# Patient Record
Sex: FEMALE | Race: BLACK OR AFRICAN AMERICAN | Employment: UNEMPLOYED | URBAN - METROPOLITAN AREA
[De-identification: names, ages, dates, MRNs, and addresses within clinical notes are randomized per-mention and may not be internally consistent; named-entity substitution may affect disease eponyms.]

---
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
## 2022-04-29 ENCOUNTER — HOSPITAL ENCOUNTER (EMERGENCY)
Age: 22
Discharge: HOME | End: 2022-04-29
Attending: EMERGENCY MEDICINE
Payer: COMMERCIAL

## 2022-04-29 VITALS
BODY MASS INDEX: 49.87 KG/M2 | HEIGHT: 62 IN | HEART RATE: 89 BPM | TEMPERATURE: 97.5 F | DIASTOLIC BLOOD PRESSURE: 65 MMHG | WEIGHT: 271 LBS | SYSTOLIC BLOOD PRESSURE: 121 MMHG | RESPIRATION RATE: 16 BRPM | OXYGEN SATURATION: 99 %

## 2022-04-29 DIAGNOSIS — R30.0 DYSURIA: Primary | ICD-10-CM

## 2022-04-29 DIAGNOSIS — R10.2 PELVIC CRAMPING: ICD-10-CM

## 2022-04-29 LAB
BACTERIA: ABNORMAL /HPF
BILIRUBIN URINE: NEGATIVE
BLOOD, URINE: ABNORMAL
CLARITY: CLEAR
COLOR: YELLOW
EPITHELIAL CELLS, UA: ABNORMAL /HPF
GLUCOSE URINE: NEGATIVE MG/DL
HCG(URINE) PREGNANCY TEST: NEGATIVE
KETONES, URINE: NEGATIVE MG/DL
LEUKOCYTE ESTERASE, URINE: NEGATIVE
NITRITE, URINE: NEGATIVE
PH UA: 6.5 (ref 5–9)
PROTEIN UA: NEGATIVE MG/DL
RBC UA: ABNORMAL /HPF (ref 0–2)
SPECIFIC GRAVITY UA: 1.01 (ref 1–1.03)
UROBILINOGEN, URINE: 0.2 E.U./DL
WBC UA: ABNORMAL /HPF (ref 0–5)

## 2022-04-29 PROCEDURE — 81025 URINE PREGNANCY TEST: CPT

## 2022-04-29 PROCEDURE — 81001 URINALYSIS AUTO W/SCOPE: CPT

## 2022-04-29 PROCEDURE — 99283 EMERGENCY DEPT VISIT LOW MDM: CPT

## 2022-04-29 ASSESSMENT — PAIN - FUNCTIONAL ASSESSMENT
PAIN_FUNCTIONAL_ASSESSMENT: NONE - DENIES PAIN
PAIN_FUNCTIONAL_ASSESSMENT: NONE - DENIES PAIN

## 2022-04-29 NOTE — ED PROVIDER NOTES
Department of Emergency Medicine   ED  Provider Note  Admit Date/RoomTime: 4/29/2022  9:27 AM  ED Room: 01/01 4/29/22  9:48 AM EDT    History of Present Illness:    Nayana Lewis is a 22 y.o. female presenting to the ED for pelvic cramps and pain on urination, beginning 3 days ago. The complaint has been persistent, mild in severity, and worsened by nothing. Denies any  sexual encounters. Patient denies fever/chills, sore throat, cough, congestion, chest pain, shortness of breath, edema, headache, visual disturbances, focal paresthesias, focal weakness, abdominal pain, nausea, vomiting, diarrhea, constipation, hematuria, trauma, neck or back pain, rash or other complaints. ROS:   A complete review of systems was performed and all pertinent positives and negatives are stated within HPI, all other systems reviewed and are negative.          --------------------------------------------- PAST HISTORY ---------------------------------------------  Past Medical History:  has a past medical history of ADHD, Autism disorder, Colitis, Schizophrenia ([facility] [address] and Seizures ([facility] Past Surgical History:  has a past surgical history that includes Breast surgery (Right) and laparoscopy (Left, 8/9/2019). Social History:  reports that she has never smoked. She has never used smokeless tobacco. She reports that she does not drink alcohol and does not use drugs. Family History: family history includes Diabetes in her mother; Other in her mother. Unless otherwise noted, family history is non contributory    The patients home medications have been reviewed.     Allergies: Dye [barium-containing compounds], Sulfa antibiotics, and Versed [midazolam]    -------------------------------------------------- RESULTS -------------------------------------------------  All laboratory and radiology results have been personally reviewed by myself   LABS:  Results for orders placed or performed during the hospital encounter of 04/29/22   Urinalysis with Microscopic   Result Value Ref Range    Color, UA Yellow Straw/Yellow    Clarity, UA Clear Clear    Glucose, Ur Negative Negative mg/dL    Bilirubin Urine Negative Negative    Ketones, Urine Negative Negative mg/dL    Specific Gravity, UA 1.010 1.005 - 1.030    Blood, Urine SMALL (A) Negative    pH, UA 6.5 5.0 - 9.0    Protein, UA Negative Negative mg/dL    Urobilinogen, Urine 0.2 <2.0 E.U./dL    Nitrite, Urine Negative Negative    Leukocyte Esterase, Urine Negative Negative    WBC, UA 0-1 0 - 5 /HPF    RBC, UA NONE 0 - 2 /HPF    Epithelial Cells, UA RARE /HPF    Bacteria, UA RARE (A) None Seen /HPF   Pregnancy, Urine   Result Value Ref Range    HCG(Urine) Pregnancy Test NEGATIVE NEGATIVE       RADIOLOGY:  Interpreted by Radiologist.  No orders to display       ------------------------- NURSING NOTES AND VITALS REVIEWED ---------------------------   The nursing notes within the ED encounter and vital signs as below have been reviewed. /65   Pulse 89   Temp 97.5 °F (36.4 °C) (Temporal)   Resp 16   Ht 5' 2\" (1.575 m)   Wt 271 lb (122.9 kg)   LMP 04/05/2022   SpO2 99%   BMI 49.57 kg/m²   Oxygen Saturation Interpretation: Normal      ---------------------------------------------------PHYSICAL EXAM--------------------------------------    Constitutional:  Well developed, well nourished, no acute distress, non-toxic appearance   Eyes:  PERRL, conjunctiva normal, EOMI  HENT:  Atraumatic, external ears normal, nose normal, oropharynx moist, no pharyngeal exudates. Neck- normal range of motion, no nuchal rigidity   Respiratory:  No respiratory distress, normal breath sounds, no rales, no wheezing   Cardiovascular:  Normal rate, normal rhythm, no murmurs, no gallops, no rubs. Radial and DP pulses 2+ bilaterally.    GI:  Soft, nondistended, normal bowel sounds, nontender, no organomegaly, no mass, no rebound, no guarding   :  No costovertebral angle tenderness Musculoskeletal:  No edema, no tenderness, no deformities. Back- no tenderness  Integument:  Well hydrated, no rash. Adequate perfusion. Lymphatic:  No cervical lymphadenopathy noted       ------------------------------ ED COURSE/MEDICAL DECISION MAKING----------------------  Medications - No data to display       MEDICATION  Discharge Medication List as of 4/29/2022 10:17 AM          Medical Decision Making:    Patient presenting with Pelvic cramps and dysuria. Irregular periods. Urine Preg negative, Urinalysis negative with UTI. Likely onset of menses per patient with pelvic cramps similar to previous. FU with PCP for further eval for dysuria. Patient was explicitly instructed on specific signs and symptoms on which to return to the emergency room for. Patient was instructed to return to the ER for any new or worsening symptoms. Additional discharge instructions were given verbally. All questions were answered. Patient is comfortable and agreeable with discharge plan. Patient in no acute distress and non-toxic in appearance. Counseling: The emergency provider has spoken with the patient and discussed todays results, in addition to providing specific details for the plan of care and counseling regarding the diagnosis and prognosis. Questions are answered at this time and they are agreeable with the plan.      --------------------------------- IMPRESSION AND DISPOSITION ---------------------------------    IMPRESSION  1. Dysuria    2.  Pelvic cramping          DISPOSITION  Disposition: Discharge to home  Patient condition is good                Lianne Esteban DO  Resident  05/02/22 9867

## 2022-06-21 ENCOUNTER — APPOINTMENT (OUTPATIENT)
Dept: GENERAL RADIOLOGY | Age: 22
End: 2022-06-21
Payer: COMMERCIAL

## 2022-06-21 ENCOUNTER — HOSPITAL ENCOUNTER (EMERGENCY)
Age: 22
Discharge: HOME | End: 2022-06-22
Attending: STUDENT IN AN ORGANIZED HEALTH CARE EDUCATION/TRAINING PROGRAM
Payer: COMMERCIAL

## 2022-06-21 ENCOUNTER — APPOINTMENT (OUTPATIENT)
Dept: CT IMAGING | Age: 22
End: 2022-06-21
Payer: COMMERCIAL

## 2022-06-21 DIAGNOSIS — G40.919 BREAKTHROUGH SEIZURE (HCC): Primary | ICD-10-CM

## 2022-06-21 LAB
ACETAMINOPHEN LEVEL: <5 MCG/ML (ref 10–30)
AMPHETAMINE SCREEN, URINE: NOT DETECTED
ANION GAP SERPL CALCULATED.3IONS-SCNC: 10 MMOL/L (ref 7–16)
BACTERIA: ABNORMAL /HPF
BARBITURATE SCREEN URINE: NOT DETECTED
BASOPHILS ABSOLUTE: 0.05 E9/L (ref 0–0.2)
BASOPHILS RELATIVE PERCENT: 0.4 % (ref 0–2)
BENZODIAZEPINE SCREEN, URINE: NOT DETECTED
BILIRUBIN URINE: NEGATIVE
BLOOD, URINE: NEGATIVE
BUN BLDV-MCNC: 11 MG/DL (ref 6–20)
CALCIUM SERPL-MCNC: 9.8 MG/DL (ref 8.6–10.2)
CANNABINOID SCREEN URINE: NOT DETECTED
CHLORIDE BLD-SCNC: 102 MMOL/L (ref 98–107)
CHP ED QC CHECK: NORMAL
CLARITY: CLEAR
CO2: 26 MMOL/L (ref 22–29)
COCAINE METABOLITE SCREEN URINE: NOT DETECTED
COLOR: YELLOW
CREAT SERPL-MCNC: 0.9 MG/DL (ref 0.5–1)
EOSINOPHILS ABSOLUTE: 0.51 E9/L (ref 0.05–0.5)
EOSINOPHILS RELATIVE PERCENT: 4.4 % (ref 0–6)
EPITHELIAL CELLS, UA: ABNORMAL /HPF
ETHANOL: <10 MG/DL (ref 0–0.08)
FENTANYL SCREEN, URINE: NOT DETECTED
GFR AFRICAN AMERICAN: >60
GFR NON-AFRICAN AMERICAN: >60 ML/MIN/1.73
GLUCOSE BLD-MCNC: 121 MG/DL
GLUCOSE BLD-MCNC: 128 MG/DL (ref 74–99)
GLUCOSE URINE: NEGATIVE MG/DL
HCT VFR BLD CALC: 41.3 % (ref 34–48)
HEMOGLOBIN: 12.7 G/DL (ref 11.5–15.5)
IMMATURE GRANULOCYTES #: 0.21 E9/L
IMMATURE GRANULOCYTES %: 1.8 % (ref 0–5)
KETONES, URINE: NEGATIVE MG/DL
LACTIC ACID: 1.4 MMOL/L (ref 0.5–2.2)
LEUKOCYTE ESTERASE, URINE: ABNORMAL
LITHIUM DOSE AMOUNT: ABNORMAL
LITHIUM LEVEL: 0.36 MMOL/L (ref 0.5–1.5)
LYMPHOCYTES ABSOLUTE: 2.68 E9/L (ref 1.5–4)
LYMPHOCYTES RELATIVE PERCENT: 23 % (ref 20–42)
Lab: NORMAL
MCH RBC QN AUTO: 25.2 PG (ref 26–35)
MCHC RBC AUTO-ENTMCNC: 30.8 % (ref 32–34.5)
MCV RBC AUTO: 81.9 FL (ref 80–99.9)
METER GLUCOSE: 121 MG/DL (ref 74–99)
METHADONE SCREEN, URINE: NOT DETECTED
MONOCYTES ABSOLUTE: 1.1 E9/L (ref 0.1–0.95)
MONOCYTES RELATIVE PERCENT: 9.4 % (ref 2–12)
NEUTROPHILS ABSOLUTE: 7.12 E9/L (ref 1.8–7.3)
NEUTROPHILS RELATIVE PERCENT: 61 % (ref 43–80)
NITRITE, URINE: NEGATIVE
OPIATE SCREEN URINE: NOT DETECTED
OXYCODONE URINE: NOT DETECTED
PDW BLD-RTO: 13.2 FL (ref 11.5–15)
PH UA: 7 (ref 5–9)
PHENCYCLIDINE SCREEN URINE: NOT DETECTED
PLATELET # BLD: 413 E9/L (ref 130–450)
PMV BLD AUTO: 9.3 FL (ref 7–12)
POTASSIUM REFLEX MAGNESIUM: 4.6 MMOL/L (ref 3.5–5)
PRO-BNP: 17 PG/ML (ref 0–125)
PROTEIN UA: NEGATIVE MG/DL
RBC # BLD: 5.04 E12/L (ref 3.5–5.5)
RBC UA: ABNORMAL /HPF (ref 0–2)
SALICYLATE, SERUM: <0.3 MG/DL (ref 0–30)
SODIUM BLD-SCNC: 138 MMOL/L (ref 132–146)
SPECIFIC GRAVITY UA: <=1.005 (ref 1–1.03)
TRICYCLIC ANTIDEPRESSANTS SCREEN SERUM: NEGATIVE NG/ML
TROPONIN, HIGH SENSITIVITY: 7 NG/L (ref 0–9)
UROBILINOGEN, URINE: 0.2 E.U./DL
WBC # BLD: 11.7 E9/L (ref 4.5–11.5)
WBC UA: ABNORMAL /HPF (ref 0–5)

## 2022-06-21 PROCEDURE — 80179 DRUG ASSAY SALICYLATE: CPT

## 2022-06-21 PROCEDURE — 83605 ASSAY OF LACTIC ACID: CPT

## 2022-06-21 PROCEDURE — 70450 CT HEAD/BRAIN W/O DYE: CPT

## 2022-06-21 PROCEDURE — 80143 DRUG ASSAY ACETAMINOPHEN: CPT

## 2022-06-21 PROCEDURE — 93005 ELECTROCARDIOGRAM TRACING: CPT | Performed by: EMERGENCY MEDICINE

## 2022-06-21 PROCEDURE — 83880 ASSAY OF NATRIURETIC PEPTIDE: CPT

## 2022-06-21 PROCEDURE — 6360000002 HC RX W HCPCS: Performed by: EMERGENCY MEDICINE

## 2022-06-21 PROCEDURE — 96372 THER/PROPH/DIAG INJ SC/IM: CPT

## 2022-06-21 PROCEDURE — 96365 THER/PROPH/DIAG IV INF INIT: CPT

## 2022-06-21 PROCEDURE — 81001 URINALYSIS AUTO W/SCOPE: CPT

## 2022-06-21 PROCEDURE — 82077 ASSAY SPEC XCP UR&BREATH IA: CPT

## 2022-06-21 PROCEDURE — 80178 ASSAY OF LITHIUM: CPT

## 2022-06-21 PROCEDURE — 99285 EMERGENCY DEPT VISIT HI MDM: CPT

## 2022-06-21 PROCEDURE — 80048 BASIC METABOLIC PNL TOTAL CA: CPT

## 2022-06-21 PROCEDURE — 82962 GLUCOSE BLOOD TEST: CPT

## 2022-06-21 PROCEDURE — 2580000003 HC RX 258: Performed by: EMERGENCY MEDICINE

## 2022-06-21 PROCEDURE — 85025 COMPLETE CBC W/AUTO DIFF WBC: CPT

## 2022-06-21 PROCEDURE — 71045 X-RAY EXAM CHEST 1 VIEW: CPT

## 2022-06-21 PROCEDURE — 84484 ASSAY OF TROPONIN QUANT: CPT

## 2022-06-21 PROCEDURE — 80307 DRUG TEST PRSMV CHEM ANLYZR: CPT

## 2022-06-21 RX ORDER — AMLODIPINE BESYLATE 5 MG/1
5 TABLET ORAL DAILY
Status: ON HOLD | COMMUNITY
End: 2022-06-24 | Stop reason: HOSPADM

## 2022-06-21 RX ORDER — LORAZEPAM 2 MG/ML
1 INJECTION INTRAMUSCULAR ONCE
Status: COMPLETED | OUTPATIENT
Start: 2022-06-21 | End: 2022-06-21

## 2022-06-21 RX ORDER — LORAZEPAM 1 MG/1
1 TABLET ORAL ONCE
Status: DISCONTINUED | OUTPATIENT
Start: 2022-06-21 | End: 2022-06-21

## 2022-06-21 RX ORDER — LORAZEPAM 0.5 MG/1
0.5 TABLET ORAL 2 TIMES DAILY
Status: ON HOLD | COMMUNITY
End: 2022-06-24 | Stop reason: HOSPADM

## 2022-06-21 RX ORDER — 0.9 % SODIUM CHLORIDE 0.9 %
1000 INTRAVENOUS SOLUTION INTRAVENOUS ONCE
Status: COMPLETED | OUTPATIENT
Start: 2022-06-21 | End: 2022-06-21

## 2022-06-21 RX ORDER — ARIPIPRAZOLE 400 MG
400 KIT INTRAMUSCULAR
Status: ON HOLD | COMMUNITY
End: 2022-06-24 | Stop reason: HOSPADM

## 2022-06-21 RX ORDER — LITHIUM CARBONATE 300 MG/1
300 CAPSULE ORAL
Status: ON HOLD | COMMUNITY
End: 2022-10-01 | Stop reason: SDUPTHER

## 2022-06-21 RX ORDER — PROMETHAZINE HYDROCHLORIDE 25 MG/ML
25 INJECTION, SOLUTION INTRAMUSCULAR; INTRAVENOUS ONCE
Status: COMPLETED | OUTPATIENT
Start: 2022-06-21 | End: 2022-06-21

## 2022-06-21 RX ADMIN — PROMETHAZINE HYDROCHLORIDE 25 MG: 25 INJECTION INTRAMUSCULAR; INTRAVENOUS at 19:15

## 2022-06-21 RX ADMIN — LEVETIRACETAM 2000 MG: 100 INJECTION, SOLUTION INTRAVENOUS at 18:30

## 2022-06-21 RX ADMIN — LORAZEPAM 1 MG: 2 INJECTION INTRAMUSCULAR; INTRAVENOUS at 18:10

## 2022-06-21 RX ADMIN — SODIUM CHLORIDE 1000 ML: 9 INJECTION, SOLUTION INTRAVENOUS at 19:55

## 2022-06-21 NOTE — ED PROVIDER NOTES
Chief Complaint   Patient presents with    Seizures       45-year-old female with past medical history of seizures presenting today with a breakthrough seizure like activity. EMS brought her to the emergency department and note that they have brought her here in the past multiple times, they noted that she has not been compliant with her medications before, her seizure was witnessed and was said to be like her usual activity, grand mal in nature. She had not been sick at all recently, nothing made it better or worse, she was postictal on arrival to emergency department was unable to provide further history. Review of Systems   Unable to perform ROS: Mental status change        Physical Exam  Vitals and nursing note reviewed. Constitutional:       Appearance: She is well-developed. HENT:      Head: Normocephalic and atraumatic. Right Ear: External ear normal.      Left Ear: External ear normal.      Nose: Nose normal.      Mouth/Throat:      Mouth: Mucous membranes are moist.      Pharynx: Oropharynx is clear. Eyes:      Pupils: Pupils are equal, round, and reactive to light. Cardiovascular:      Rate and Rhythm: Normal rate and regular rhythm. Heart sounds: Normal heart sounds. No murmur heard. Pulmonary:      Effort: Pulmonary effort is normal. No respiratory distress. Breath sounds: Normal breath sounds. No wheezing. Abdominal:      General: Bowel sounds are normal.      Palpations: Abdomen is soft. Tenderness: There is no abdominal tenderness. There is no guarding or rebound. Musculoskeletal:         General: No swelling. Cervical back: Normal range of motion and neck supple. Skin:     General: Skin is warm and dry. Neurological:      General: No focal deficit present. Mental Status: She is alert.       Comments: Patient postictal on arrival to emergency department, is arousable, pupils are equal and reactive, no focal deficits, moving all extremities          Procedures     EKG: This EKG is signed and interpreted by me. Rate: 98  Rhythm: Sinus  Interpretation: no acute changes, no acute ST elevations, intervals are normal, , normal axis  Comparison: stable as compared to patient's most recent EKG      MDM  Number of Diagnoses or Management Options  Breakthrough seizure [facility])  Diagnosis management comments: 29-year-old female past medical history of seizures presenting with breakthrough seizure-like activity. On arrival to emergency department she was tachycardic and slightly hypertensive otherwise hemodynamically stable. Lab work did not demonstrate acute abnormality, no substance intoxication, no evidence of infection, glucose was within normal limits. CT head was obtained without acute pathology or explanation. EKG unremarkable. She was loaded with Keppra and managed with Ativan as she was minimally shaking on arrival to emergency department has been noted to have a variety of different types of seizures. She not have a recurrence of grand mal seizure in the emergency department. Patient became awake and alert with observation, discussed case with her mom who said that patient seemed as if she was at her baseline. Discussed plans for follow-up with neurology and they verbalized understanding. ED Course as of 06/21/22 2056 Tue Jun 21, 2022 1947 Patient is awake, alert, oriented. No other acute complaints. [MM]   2040 Discussed patient's mental status with her mother who is currently out of town and from description of what we are seeing in the emergency department, she does believe the patient is at her baseline. Mom notes that she is handicapped and is always slow to speak. No other concerning events. [MM]   2051 Discussed plan for discharge with patient verbalized understanding, alert and oriented and at baseline.  [MM]      ED Course User Index  [MM] Pearl Sin DO        ED Course as of 06/21/22 2056 Tue Pramod 21, 2022 1947 Patient is awake, alert, oriented. No other acute complaints. [MM]   2040 Discussed patient's mental status with her mother who is currently out of town and from description of what we are seeing in the emergency department, she does believe the patient is at her baseline. Mom notes that she is handicapped and is always slow to speak. No other concerning events. [MM]   2051 Discussed plan for discharge with patient verbalized understanding, alert and oriented and at baseline. [MM]      ED Course User Index  [MM] Iliana Carrasco, DO       --------------------------------------------- PAST HISTORY ---------------------------------------------  Past Medical History:  has a past medical history of ADHD, Autism disorder, Colitis, Schizophrenia ([facility]), and Seizures ([facility]). Past Surgical History:  has a past surgical history that includes Breast surgery (Right) and laparoscopy (Left, 8/9/2019). Social History:  reports that she has never smoked. She has never used smokeless tobacco. She reports that she does not drink alcohol and does not use drugs. Family History: family history includes Diabetes in her mother; Other in her mother. The patients home medications have been reviewed.     Allergies: Dye [barium-containing compounds], Sulfa antibiotics, and Versed [midazolam]    -------------------------------------------------- RESULTS -------------------------------------------------  Labs:  Results for orders placed or performed during the hospital encounter of 06/21/22   CBC with Auto Differential   Result Value Ref Range    WBC 11.7 (H) 4.5 - 11.5 E9/L    RBC 5.04 3.50 - 5.50 E12/L    Hemoglobin 12.7 11.5 - 15.5 g/dL    Hematocrit 41.3 34.0 - 48.0 %    MCV 81.9 80.0 - 99.9 fL    MCH 25.2 (L) 26.0 - 35.0 pg    MCHC 30.8 (L) 32.0 - 34.5 %    RDW 13.2 11.5 - 15.0 fL    Platelets 856 452 - 591 E9/L    MPV 9.3 7.0 - 12.0 fL    Neutrophils % 61.0 43.0 - 80.0 %    Immature Granulocytes % 1.8 0.0 - 5.0 %    Lymphocytes % 23.0 20.0 - 42.0 %    Monocytes % 9.4 2.0 - 12.0 %    Eosinophils % 4.4 0.0 - 6.0 %    Basophils % 0.4 0.0 - 2.0 %    Neutrophils Absolute 7.12 1.80 - 7.30 E9/L    Immature Granulocytes # 0.21 E9/L    Lymphocytes Absolute 2.68 1.50 - 4.00 E9/L    Monocytes Absolute 1.10 (H) 0.10 - 0.95 E9/L    Eosinophils Absolute 0.51 (H) 0.05 - 0.50 E9/L    Basophils Absolute 0.05 0.00 - 0.20 C0/A   Basic Metabolic Panel w/ Reflex to MG   Result Value Ref Range    Sodium 138 132 - 146 mmol/L    Potassium reflex Magnesium 4.6 3.5 - 5.0 mmol/L    Chloride 102 98 - 107 mmol/L    CO2 26 22 - 29 mmol/L    Anion Gap 10 7 - 16 mmol/L    Glucose 128 (H) 74 - 99 mg/dL    BUN 11 6 - 20 mg/dL    CREATININE 0.9 0.5 - 1.0 mg/dL    GFR Non-African American >60 >=60 mL/min/1.73    GFR African American >60     Calcium 9.8 8.6 - 10.2 mg/dL   Troponin   Result Value Ref Range    Troponin, High Sensitivity 7 0 - 9 ng/L   Brain Natriuretic Peptide   Result Value Ref Range    Pro-BNP 17 0 - 125 pg/mL   Urinalysis with Microscopic   Result Value Ref Range    Color, UA Yellow Straw/Yellow    Clarity, UA Clear Clear    Glucose, Ur Negative Negative mg/dL    Bilirubin Urine Negative Negative    Ketones, Urine Negative Negative mg/dL    Specific Gravity, UA <=1.005 1.005 - 1.030    Blood, Urine Negative Negative    pH, UA 7.0 5.0 - 9.0    Protein, UA Negative Negative mg/dL    Urobilinogen, Urine 0.2 <2.0 E.U./dL    Nitrite, Urine Negative Negative    Leukocyte Esterase, Urine TRACE (A) Negative    WBC, UA 0-1 0 - 5 /HPF    RBC, UA NONE 0 - 2 /HPF    Epithelial Cells, UA FEW /HPF    Bacteria, UA RARE (A) None Seen /HPF   Lactic Acid   Result Value Ref Range    Lactic Acid 1.4 0.5 - 2.2 mmol/L   Serum Drug Screen   Result Value Ref Range    Ethanol Lvl <10 mg/dL    Acetaminophen Level <5.0 (L) 10.0 - 16.2 mcg/mL    Salicylate, Serum <3.2 0.0 - 30.0 mg/dL   URINE DRUG SCREEN   Result Value Ref Range    Amphetamine Screen, Urine NOT DETECTED Negative <1000 ng/mL    Barbiturate Screen, Ur NOT DETECTED Negative < 200 ng/mL    Benzodiazepine Screen, Urine NOT DETECTED Negative < 200 ng/mL    Cannabinoid Scrn, Ur NOT DETECTED Negative < 50ng/mL    Cocaine Metabolite Screen, Urine NOT DETECTED Negative < 300 ng/mL    Opiate Scrn, Ur NOT DETECTED Negative < 300ng/mL    PCP Screen, Urine NOT DETECTED Negative < 25 ng/mL    Methadone Screen, Urine NOT DETECTED Negative <300 ng/mL    Oxycodone Urine NOT DETECTED Negative <100 ng/mL    FENTANYL SCREEN, URINE NOT DETECTED Negative <1 ng/mL    Drug Screen Comment: see below    Lithium Level   Result Value Ref Range    Lithium Dose Amount Unknown    POCT Glucose   Result Value Ref Range    Glucose 121 mg/dL    QC OK? ok    POCT Glucose   Result Value Ref Range    Meter Glucose 121 (H) 74 - 99 mg/dL   EKG 12 Lead   Result Value Ref Range    Ventricular Rate 98 BPM    Atrial Rate 98 BPM    P-R Interval 144 ms    QRS Duration 72 ms    Q-T Interval 328 ms    QTc Calculation (Bazett) 418 ms    P Axis 33 degrees    R Axis 32 degrees    T Axis 12 degrees       Radiology:  CT Head WO Contrast   Final Result   No acute intracranial abnormality. XR CHEST PORTABLE   Final Result   No acute process. ------------------------- NURSING NOTES AND VITALS REVIEWED ---------------------------  Date / Time Roomed:  6/21/2022  5:54 PM  ED Bed Assignment:  10/10    The nursing notes within the ED encounter and vital signs as below have been reviewed.    BP (!) 145/86   Pulse 100   Temp 98.8 °F (37.1 °C) (Axillary)   Resp 20   Ht 5' 2\" (1.575 m)   Wt 271 lb (122.9 kg)   SpO2 98%   BMI 49.57 kg/m²   Oxygen Saturation Interpretation: Normal      ------------------------------------------ PROGRESS NOTES ------------------------------------------  I have spoken with the patient and discussed todays results, in addition to providing specific details for the plan of care and counseling regarding the diagnosis and prognosis. Their questions are answered at this time and they are agreeable with the plan. I discussed at length with them reasons for immediate return here for re evaluation. They will followup with primary care by calling their office tomorrow. --------------------------------- ADDITIONAL PROVIDER NOTES ---------------------------------  At this time the patient is without objective evidence of an acute process requiring hospitalization or inpatient management. They have remained hemodynamically stable throughout their entire ED visit and are stable for discharge with outpatient follow-up. The plan has been discussed in detail and they are aware of the specific conditions for emergent return, as well as the importance of follow-up. New Prescriptions    No medications on file       Diagnosis:  1. Breakthrough seizure ([facility] [address]        Disposition:  Patient's disposition: Discharge to facility  Patient's condition is stable.            Caroline Causey DO  Resident  06/21/22 2056

## 2022-06-22 ENCOUNTER — APPOINTMENT (OUTPATIENT)
Dept: CT IMAGING | Age: 22
End: 2022-06-22
Payer: COMMERCIAL

## 2022-06-22 ENCOUNTER — HOSPITAL ENCOUNTER (OUTPATIENT)
Age: 22
Setting detail: OBSERVATION
Discharge: HOME | End: 2022-06-25
Attending: EMERGENCY MEDICINE | Admitting: INTERNAL MEDICINE
Payer: COMMERCIAL

## 2022-06-22 VITALS
TEMPERATURE: 98.8 F | OXYGEN SATURATION: 100 % | WEIGHT: 271 LBS | HEART RATE: 82 BPM | HEIGHT: 62 IN | BODY MASS INDEX: 49.87 KG/M2 | RESPIRATION RATE: 18 BRPM | SYSTOLIC BLOOD PRESSURE: 146 MMHG | DIASTOLIC BLOOD PRESSURE: 86 MMHG

## 2022-06-22 DIAGNOSIS — R41.82 ALTERED MENTAL STATUS, UNSPECIFIED ALTERED MENTAL STATUS TYPE: Primary | ICD-10-CM

## 2022-06-22 DIAGNOSIS — R53.1 GENERALIZED WEAKNESS: ICD-10-CM

## 2022-06-22 LAB
ACETAMINOPHEN LEVEL: <5 MCG/ML (ref 10–30)
ALBUMIN SERPL-MCNC: 4.1 G/DL (ref 3.5–5.2)
ALP BLD-CCNC: 80 U/L (ref 35–104)
ALT SERPL-CCNC: 17 U/L (ref 0–32)
AMPHETAMINE SCREEN, URINE: NOT DETECTED
ANION GAP SERPL CALCULATED.3IONS-SCNC: 8 MMOL/L (ref 7–16)
AST SERPL-CCNC: 15 U/L (ref 0–31)
BARBITURATE SCREEN URINE: NOT DETECTED
BASOPHILS ABSOLUTE: 0.03 E9/L (ref 0–0.2)
BASOPHILS RELATIVE PERCENT: 0.3 % (ref 0–2)
BENZODIAZEPINE SCREEN, URINE: NOT DETECTED
BILIRUB SERPL-MCNC: 0.3 MG/DL (ref 0–1.2)
BUN BLDV-MCNC: 9 MG/DL (ref 6–20)
CALCIUM SERPL-MCNC: 9.3 MG/DL (ref 8.6–10.2)
CANNABINOID SCREEN URINE: NOT DETECTED
CHLORIDE BLD-SCNC: 106 MMOL/L (ref 98–107)
CO2: 26 MMOL/L (ref 22–29)
COCAINE METABOLITE SCREEN URINE: NOT DETECTED
CREAT SERPL-MCNC: 0.9 MG/DL (ref 0.5–1)
EKG ATRIAL RATE: 98 BPM
EKG P AXIS: 33 DEGREES
EKG P-R INTERVAL: 144 MS
EKG Q-T INTERVAL: 328 MS
EKG QRS DURATION: 72 MS
EKG QTC CALCULATION (BAZETT): 418 MS
EKG R AXIS: 32 DEGREES
EKG T AXIS: 12 DEGREES
EKG VENTRICULAR RATE: 98 BPM
EOSINOPHILS ABSOLUTE: 0.42 E9/L (ref 0.05–0.5)
EOSINOPHILS RELATIVE PERCENT: 4 % (ref 0–6)
ETHANOL: <10 MG/DL (ref 0–0.08)
FENTANYL SCREEN, URINE: NOT DETECTED
GFR AFRICAN AMERICAN: >60
GFR NON-AFRICAN AMERICAN: >60 ML/MIN/1.73
GLUCOSE BLD-MCNC: 83 MG/DL (ref 74–99)
HCG, URINE, POC: NEGATIVE
HCT VFR BLD CALC: 40.3 % (ref 34–48)
HEMOGLOBIN: 12.1 G/DL (ref 11.5–15.5)
IMMATURE GRANULOCYTES #: 0.13 E9/L
IMMATURE GRANULOCYTES %: 1.2 % (ref 0–5)
LYMPHOCYTES ABSOLUTE: 1.66 E9/L (ref 1.5–4)
LYMPHOCYTES RELATIVE PERCENT: 15.7 % (ref 20–42)
Lab: NORMAL
Lab: NORMAL
MCH RBC QN AUTO: 25.5 PG (ref 26–35)
MCHC RBC AUTO-ENTMCNC: 30 % (ref 32–34.5)
MCV RBC AUTO: 84.8 FL (ref 80–99.9)
METHADONE SCREEN, URINE: NOT DETECTED
MONOCYTES ABSOLUTE: 1.01 E9/L (ref 0.1–0.95)
MONOCYTES RELATIVE PERCENT: 9.5 % (ref 2–12)
NEGATIVE QC PASS/FAIL: NORMAL
NEUTROPHILS ABSOLUTE: 7.34 E9/L (ref 1.8–7.3)
NEUTROPHILS RELATIVE PERCENT: 69.3 % (ref 43–80)
OPIATE SCREEN URINE: NOT DETECTED
OXYCODONE URINE: NOT DETECTED
PDW BLD-RTO: 13.4 FL (ref 11.5–15)
PHENCYCLIDINE SCREEN URINE: NOT DETECTED
PLATELET # BLD: 401 E9/L (ref 130–450)
PMV BLD AUTO: 9.6 FL (ref 7–12)
POSITIVE QC PASS/FAIL: NORMAL
POTASSIUM SERPL-SCNC: 4.2 MMOL/L (ref 3.5–5)
RBC # BLD: 4.75 E12/L (ref 3.5–5.5)
SALICYLATE, SERUM: <0.3 MG/DL (ref 0–30)
SODIUM BLD-SCNC: 140 MMOL/L (ref 132–146)
TOTAL CK: 140 U/L (ref 20–180)
TOTAL PROTEIN: 7.3 G/DL (ref 6.4–8.3)
TRICYCLIC ANTIDEPRESSANTS SCREEN SERUM: NEGATIVE NG/ML
WBC # BLD: 10.6 E9/L (ref 4.5–11.5)

## 2022-06-22 PROCEDURE — 80143 DRUG ASSAY ACETAMINOPHEN: CPT

## 2022-06-22 PROCEDURE — 85025 COMPLETE CBC W/AUTO DIFF WBC: CPT

## 2022-06-22 PROCEDURE — 6370000000 HC RX 637 (ALT 250 FOR IP): Performed by: INTERNAL MEDICINE

## 2022-06-22 PROCEDURE — 82077 ASSAY SPEC XCP UR&BREATH IA: CPT

## 2022-06-22 PROCEDURE — 6360000002 HC RX W HCPCS: Performed by: INTERNAL MEDICINE

## 2022-06-22 PROCEDURE — 96372 THER/PROPH/DIAG INJ SC/IM: CPT

## 2022-06-22 PROCEDURE — 70450 CT HEAD/BRAIN W/O DYE: CPT

## 2022-06-22 PROCEDURE — G0378 HOSPITAL OBSERVATION PER HR: HCPCS

## 2022-06-22 PROCEDURE — 6370000000 HC RX 637 (ALT 250 FOR IP): Performed by: EMERGENCY MEDICINE

## 2022-06-22 PROCEDURE — 2580000003 HC RX 258: Performed by: INTERNAL MEDICINE

## 2022-06-22 PROCEDURE — 36415 COLL VENOUS BLD VENIPUNCTURE: CPT

## 2022-06-22 PROCEDURE — 80179 DRUG ASSAY SALICYLATE: CPT

## 2022-06-22 PROCEDURE — 80307 DRUG TEST PRSMV CHEM ANLYZR: CPT

## 2022-06-22 PROCEDURE — 80053 COMPREHEN METABOLIC PANEL: CPT

## 2022-06-22 PROCEDURE — 82550 ASSAY OF CK (CPK): CPT

## 2022-06-22 PROCEDURE — 99285 EMERGENCY DEPT VISIT HI MDM: CPT

## 2022-06-22 RX ORDER — SODIUM CHLORIDE 0.9 % (FLUSH) 0.9 %
5-40 SYRINGE (ML) INJECTION EVERY 12 HOURS SCHEDULED
Status: DISCONTINUED | OUTPATIENT
Start: 2022-06-22 | End: 2022-06-25 | Stop reason: HOSPADM

## 2022-06-22 RX ORDER — POLYETHYLENE GLYCOL 3350 17 G/17G
17 POWDER, FOR SOLUTION ORAL DAILY PRN
Status: DISCONTINUED | OUTPATIENT
Start: 2022-06-22 | End: 2022-06-25 | Stop reason: HOSPADM

## 2022-06-22 RX ORDER — DEXTROSE MONOHYDRATE 50 MG/ML
100 INJECTION, SOLUTION INTRAVENOUS PRN
Status: DISCONTINUED | OUTPATIENT
Start: 2022-06-22 | End: 2022-06-25 | Stop reason: HOSPADM

## 2022-06-22 RX ORDER — ENOXAPARIN SODIUM 100 MG/ML
30 INJECTION SUBCUTANEOUS 2 TIMES DAILY
Status: DISCONTINUED | OUTPATIENT
Start: 2022-06-22 | End: 2022-06-25 | Stop reason: HOSPADM

## 2022-06-22 RX ORDER — LEVETIRACETAM 500 MG/1
1000 TABLET ORAL ONCE
Status: COMPLETED | OUTPATIENT
Start: 2022-06-22 | End: 2022-06-22

## 2022-06-22 RX ORDER — ONDANSETRON 4 MG/1
4 TABLET, ORALLY DISINTEGRATING ORAL EVERY 8 HOURS PRN
Status: DISCONTINUED | OUTPATIENT
Start: 2022-06-22 | End: 2022-06-25 | Stop reason: HOSPADM

## 2022-06-22 RX ORDER — SODIUM CHLORIDE 9 MG/ML
INJECTION, SOLUTION INTRAVENOUS PRN
Status: DISCONTINUED | OUTPATIENT
Start: 2022-06-22 | End: 2022-06-25 | Stop reason: HOSPADM

## 2022-06-22 RX ORDER — ACETAMINOPHEN 650 MG/1
650 SUPPOSITORY RECTAL EVERY 6 HOURS PRN
Status: DISCONTINUED | OUTPATIENT
Start: 2022-06-22 | End: 2022-06-25 | Stop reason: HOSPADM

## 2022-06-22 RX ORDER — ENOXAPARIN SODIUM 100 MG/ML
40 INJECTION SUBCUTANEOUS DAILY
Status: DISCONTINUED | OUTPATIENT
Start: 2022-06-22 | End: 2022-06-22 | Stop reason: SDUPTHER

## 2022-06-22 RX ORDER — LEVETIRACETAM 500 MG/1
1000 TABLET ORAL 2 TIMES DAILY
Status: DISCONTINUED | OUTPATIENT
Start: 2022-06-22 | End: 2022-06-25 | Stop reason: HOSPADM

## 2022-06-22 RX ORDER — ONDANSETRON 2 MG/ML
4 INJECTION INTRAMUSCULAR; INTRAVENOUS EVERY 6 HOURS PRN
Status: DISCONTINUED | OUTPATIENT
Start: 2022-06-22 | End: 2022-06-25 | Stop reason: HOSPADM

## 2022-06-22 RX ORDER — LITHIUM CARBONATE 300 MG/1
300 CAPSULE ORAL
Status: DISCONTINUED | OUTPATIENT
Start: 2022-06-22 | End: 2022-06-25 | Stop reason: HOSPADM

## 2022-06-22 RX ORDER — SODIUM CHLORIDE 0.9 % (FLUSH) 0.9 %
5-40 SYRINGE (ML) INJECTION PRN
Status: DISCONTINUED | OUTPATIENT
Start: 2022-06-22 | End: 2022-06-25 | Stop reason: HOSPADM

## 2022-06-22 RX ORDER — ACETAMINOPHEN 325 MG/1
650 TABLET ORAL EVERY 6 HOURS PRN
Status: DISCONTINUED | OUTPATIENT
Start: 2022-06-22 | End: 2022-06-25 | Stop reason: HOSPADM

## 2022-06-22 RX ADMIN — Medication 10 ML: at 21:16

## 2022-06-22 RX ADMIN — LEVETIRACETAM 1000 MG: 500 TABLET, FILM COATED ORAL at 21:02

## 2022-06-22 RX ADMIN — LEVETIRACETAM 1000 MG: 500 TABLET, FILM COATED ORAL at 10:29

## 2022-06-22 RX ADMIN — LITHIUM CARBONATE 300 MG: 300 CAPSULE, GELATIN COATED ORAL at 18:52

## 2022-06-22 RX ADMIN — ENOXAPARIN SODIUM 30 MG: 100 INJECTION SUBCUTANEOUS at 21:02

## 2022-06-22 ASSESSMENT — PAIN DESCRIPTION - DESCRIPTORS: DESCRIPTORS: STABBING

## 2022-06-22 ASSESSMENT — ENCOUNTER SYMPTOMS
SHORTNESS OF BREATH: 0
ABDOMINAL PAIN: 0
CHEST TIGHTNESS: 1
NAUSEA: 0
DIFFICULTY BREATHING: 0
VOMITING: 0

## 2022-06-22 ASSESSMENT — PAIN DESCRIPTION - LOCATION: LOCATION: CHEST

## 2022-06-22 ASSESSMENT — PAIN - FUNCTIONAL ASSESSMENT: PAIN_FUNCTIONAL_ASSESSMENT: 0-10

## 2022-06-22 ASSESSMENT — PAIN SCALES - GENERAL: PAINLEVEL_OUTOF10: 10

## 2022-06-22 NOTE — ED PROVIDER NOTES
The history is provided by the patient and the EMS personnel. Altered Mental Status  Presenting symptoms: confusion    Presenting symptoms comment:  Somnolent and with difficulty ambulating. Severity:  Moderate  Most recent episode: Today  Episode history:  Unable to specify  Timing:  Constant  Progression:  Unchanged  Chronicity:  New  Context: taking medications as prescribed and not recent change in medication    Context comment:  Patient was at [facility] for psychiatric eval, seizure last night, evaluated and discharged  Associated symptoms: weakness    Associated symptoms: no abdominal pain, no difficulty breathing, no fever, no headaches, no light-headedness, no nausea, no palpitations and no vomiting        Review of Systems   Constitutional: Negative for fatigue and fever. HENT: Negative. Eyes: Negative for visual disturbance. Respiratory: Positive for chest tightness. Negative for shortness of breath. Cardiovascular: Negative for palpitations. Gastrointestinal: Negative for abdominal pain, nausea and vomiting. Genitourinary: Negative. Musculoskeletal: Positive for gait problem. Skin: Negative. Neurological: Positive for weakness. Negative for dizziness, syncope, light-headedness, numbness and headaches. Hematological: Negative. Psychiatric/Behavioral: Positive for confusion and decreased concentration. Physical Exam  Constitutional:       General: She is not in acute distress. Appearance: She is normal weight. She is not ill-appearing. Comments: somnolent   HENT:      Head: Normocephalic and atraumatic. Nose: Nose normal.      Mouth/Throat:      Mouth: Mucous membranes are moist.      Pharynx: Oropharynx is clear. Eyes:      Extraocular Movements: Extraocular movements intact. Conjunctiva/sclera: Conjunctivae normal.      Pupils: Pupils are equal, round, and reactive to light. Cardiovascular:      Rate and Rhythm: Normal rate.       Pulses: Normal pulses. Pulmonary:      Effort: Pulmonary effort is normal.      Breath sounds: Normal breath sounds. Chest:      Chest wall: No tenderness. Abdominal:      General: Abdomen is flat. Tenderness: There is no abdominal tenderness. Musculoskeletal:         General: No tenderness. Normal range of motion. Cervical back: Normal range of motion and neck supple. Right lower leg: No edema. Left lower leg: No edema. Skin:     Capillary Refill: Capillary refill takes less than 2 seconds. Coloration: Skin is not pale. Findings: No erythema or rash. Neurological:      General: No focal deficit present. Motor: Weakness (generalized of all extremities) present. Procedures    MDM    ED Course as of 06/22/22 1608 Wed Jun 22, 2022   [address] [place] [address]. Patient with seizure activity last night and sent to the ED. She returned. This morning she was very weak and somnolent. She was unable to stand and ambulate on her own. She continues to have difficulty ambulating now. Will perform CT and further evaluate. [JS]      ED Course User Index  [JS] [address], DO     ED Course as of 06/22/22 1608 Wed Jun 22, 2022   [facility]. Patient with seizure activity last night and sent to the ED. She returned. This morning she was very weak and somnolent. She was unable to stand and ambulate on her own. She continues to have difficulty ambulating now. Will perform CT and further evaluate. [JS]      ED Course User Index  [JS] [address], DO       --------------------------------------------- PAST HISTORY ---------------------------------------------  Past Medical History:  has a past medical history of ADHD, Autism disorder, Colitis, Schizophrenia ([facility] [address] and Seizures ([facility] [address] Past Surgical History:  has a past surgical history that includes Breast surgery (Right) and laparoscopy (Left, 8/9/2019).     Social History: reports that she has never smoked. She has never used smokeless tobacco. She reports that she does not drink alcohol and does not use drugs. Family History: family history includes Diabetes in her mother; Other in her mother. The patients home medications have been reviewed.     Allergies: Dye [barium-containing compounds], Sulfa antibiotics, and Versed [midazolam]    -------------------------------------------------- RESULTS -------------------------------------------------    LABS:  Results for orders placed or performed during the hospital encounter of 06/22/22   URINE DRUG SCREEN   Result Value Ref Range    Amphetamine Screen, Urine NOT DETECTED Negative <1000 ng/mL    Barbiturate Screen, Ur NOT DETECTED Negative < 200 ng/mL    Benzodiazepine Screen, Urine NOT DETECTED Negative < 200 ng/mL    Cannabinoid Scrn, Ur NOT DETECTED Negative < 50ng/mL    Cocaine Metabolite Screen, Urine NOT DETECTED Negative < 300 ng/mL    Opiate Scrn, Ur NOT DETECTED Negative < 300ng/mL    PCP Screen, Urine NOT DETECTED Negative < 25 ng/mL    Methadone Screen, Urine NOT DETECTED Negative <300 ng/mL    Oxycodone Urine NOT DETECTED Negative <100 ng/mL    FENTANYL SCREEN, URINE NOT DETECTED Negative <1 ng/mL    Drug Screen Comment: see below    Serum Drug Screen   Result Value Ref Range    Ethanol Lvl <10 mg/dL    Acetaminophen Level <5.0 (L) 10.0 - 38.6 mcg/mL    Salicylate, Serum <7.7 0.0 - 30.0 mg/dL    TCA Scrn NEGATIVE Cutoff:300 ng/mL   CBC with Auto Differential   Result Value Ref Range    WBC 10.6 4.5 - 11.5 E9/L    RBC 4.75 3.50 - 5.50 E12/L    Hemoglobin 12.1 11.5 - 15.5 g/dL    Hematocrit 40.3 34.0 - 48.0 %    MCV 84.8 80.0 - 99.9 fL    MCH 25.5 (L) 26.0 - 35.0 pg    MCHC 30.0 (L) 32.0 - 34.5 %    RDW 13.4 11.5 - 15.0 fL    Platelets 994 260 - 655 E9/L    MPV 9.6 7.0 - 12.0 fL    Neutrophils % 69.3 43.0 - 80.0 %    Immature Granulocytes % 1.2 0.0 - 5.0 %    Lymphocytes % 15.7 (L) 20.0 - 42.0 %    Monocytes % 9.5 2.0 - 12.0 %    Eosinophils % 4.0 0.0 - 6.0 %    Basophils % 0.3 0.0 - 2.0 %    Neutrophils Absolute 7.34 (H) 1.80 - 7.30 E9/L    Immature Granulocytes # 0.13 E9/L    Lymphocytes Absolute 1.66 1.50 - 4.00 E9/L    Monocytes Absolute 1.01 (H) 0.10 - 0.95 E9/L    Eosinophils Absolute 0.42 0.05 - 0.50 E9/L    Basophils Absolute 0.03 0.00 - 0.20 E9/L   Comprehensive Metabolic Panel   Result Value Ref Range    Sodium 140 132 - 146 mmol/L    Potassium 4.2 3.5 - 5.0 mmol/L    Chloride 106 98 - 107 mmol/L    CO2 26 22 - 29 mmol/L    Anion Gap 8 7 - 16 mmol/L    Glucose 83 74 - 99 mg/dL    BUN 9 6 - 20 mg/dL    CREATININE 0.9 0.5 - 1.0 mg/dL    GFR Non-African American >60 >=60 mL/min/1.73    GFR African American >60     Calcium 9.3 8.6 - 10.2 mg/dL    Total Protein 7.3 6.4 - 8.3 g/dL    Albumin 4.1 3.5 - 5.2 g/dL    Total Bilirubin 0.3 0.0 - 1.2 mg/dL    Alkaline Phosphatase 80 35 - 104 U/L    ALT 17 0 - 32 U/L    AST 15 0 - 31 U/L   POC Pregnancy Urine Qual   Result Value Ref Range    HCG, Urine, POC Negative Negative    Lot Number [id number]     Positive QC Pass/Fail Pass     Negative QC Pass/Fail Pass        RADIOLOGY:  CT HEAD WO CONTRAST   Final Result   No acute intracranial abnormality. RECOMMENDATIONS:   Unavailable             EKG:  This EKG is signed and interpreted by me. Rate: 84 bpm  Rhythm: Sinus  Interpretation: no acute changes  Comparison: was normal and no previous EKG available      ------------------------- NURSING NOTES AND VITALS REVIEWED ---------------------------  Date / Time Roomed:  6/22/2022  8:49 AM  ED Bed Assignment:  [id number]    The nursing notes within the ED encounter and vital signs as below have been reviewed.      Patient Vitals for the past 24 hrs:   BP Temp Temp src Pulse Resp SpO2   06/22/22 1225 -- -- -- -- 16 100 %   06/22/22 1047 -- -- -- 89 18 100 %   06/22/22 0852 121/82 97.8 °F (36.6 °C) Oral 100 20 99 %       Oxygen Saturation Interpretation: Normal    ------------------------------------------ PROGRESS NOTES ------------------------------------------  Re-evaluation(s):  Time: 16:00  Patients symptoms show no change, she is too weak to sit up on her own in the bed. Repeat physical examination is not changed    Counseling:  I have spoken with the patient and discussed todays results, in addition to providing specific details for the plan of care and counseling regarding the diagnosis and prognosis. Their questions are answered at this time and they are agreeable with the plan of admission.    --------------------------------- ADDITIONAL PROVIDER NOTES ---------------------------------  Consultations:  Time: 16:10. Spoke with Dr. Janusz Corral for Dr Loy Chavis. Discussed case. They will admit the patient. This patient's ED course included: a personal history and physicial examination and multiple bedside re-evaluations    This patient has remained hemodynamically stable during their ED course. Diagnosis:  1. Altered mental status, unspecified altered mental status type    2. Generalized weakness        Disposition:  Patient's disposition: Admit to med/surg floor  Patient's condition is stable.          Edmar Guzman DO  06/22/22 1749

## 2022-06-22 NOTE — H&P
Department of Internal Medicine  History and Physical    PCP: Preston Espinoza DO  Admitting Physician: Dr. Ashu Ernst  Consultants:   Date of Service: 6/22/2022    CHIEF COMPLAINT: Fatigue and ambulatory dysfunction    HISTORY OF PRESENT ILLNESS:    Patient is 73-year-old female who presented to the ED due to fatigue and ambulatory dysfunction. Patient states she suffered a seizure yesterday. Following seizure she continued to experience weakness and fatigue. She describes it as sharp and pounding. She also admits to slurred speech, lightheadedness, and unsteady gait. Patient states that she has been taking her seizure medications as prescribed. Denies any previous occurrence following seizure. However she states that she has had similar headache in the past.    PAST MEDICAL Hx:  Past Medical History:   Diagnosis Date    ADHD     Autism disorder     Colitis     [facility]ia ([facility]     Seizures ([facility]        PAST SURGICAL Hx:   Past Surgical History:   Procedure Laterality Date    BREAST SURGERY Right     lumpectomy    LAPAROSCOPY Left 8/9/2019    LAPAROSCOPY, REMOVAL OF LEFT OVARIAN MASS, PERITONEAL WASHINGS FOR CELL BLOCK performed by Tita Basilio MD at [address] Hx:  Family History   Problem Relation Age of Onset    Diabetes Mother     Other Mother         lupus       HOME MEDICATIONS:  Prior to Admission medications    Medication Sig Start Date End Date Taking? Authorizing Provider   lithium 300 MG capsule Take 300 mg by mouth 3 times daily (with meals)    Historical Provider, MD   amLODIPine (NORVASC) 5 MG tablet Take 5 mg by mouth daily    Historical Provider, MD   LORazepam (ATIVAN) 0.5 MG tablet Take 0.5 mg by mouth 2 times daily.     Historical Provider, MD   ARIPiprazole ER (ABILIFY MAINTENA) 400 MG PRSY Inject 400 mg into the muscle every 30 days    Historical Provider, MD   levETIRAcetam (KEPPRA) 1000 MG tablet Take 1,000 mg by mouth 2 times daily     Historical Provider, MD pantoprazole (PROTONIX) 40 MG tablet Take 40 mg by mouth daily     Historical Provider, MD       ALLERGIES:  Dye [barium-containing compounds], Sulfa antibiotics, and Versed [midazolam]    SOCIAL Hx:  Social History     Socioeconomic History    Marital status: Single     Spouse name: Not on file    Number of children: Not on file    Years of education: Not on file    Highest education level: Not on file   Occupational History    Not on file   Tobacco Use    Smoking status: Never Smoker    Smokeless tobacco: Never Used   Substance and Sexual Activity    Alcohol use: No    Drug use: No    Sexual activity: Not on file   Other Topics Concern    Not on file   Social History Narrative    Not on file     Social Determinants of Health     Financial Resource Strain:     Difficulty of Paying Living Expenses: Not on file   Food Insecurity:     Worried About Running Out of Food in the Last Year: Not on file    Koffi of Food in the Last Year: Not on file   Transportation Needs:     Lack of Transportation (Medical): Not on file    Lack of Transportation (Non-Medical):  Not on file   Physical Activity:     Days of Exercise per Week: Not on file    Minutes of Exercise per Session: Not on file   Stress:     Feeling of Stress : Not on file   Social Connections:     Frequency of Communication with Friends and Family: Not on file    Frequency of Social Gatherings with Friends and Family: Not on file    Attends Taoism Services: Not on file    Active Member of Clubs or Organizations: Not on file    Attends Club or Organization Meetings: Not on file    Marital Status: Not on file   Intimate Partner Violence:     Fear of Current or Ex-Partner: Not on file    Emotionally Abused: Not on file    Physically Abused: Not on file    Sexually Abused: Not on file   Housing Stability:     Unable to Pay for Housing in the Last Year: Not on file    Number of Places Lived in the Last Year: Not on file    Unstable Housing in the Last Year: Not on file       ROS: Positive in bold  General:   Denies chills, fatigue, fever, malaise, night sweats or weight loss    Psychological:   Denies anxiety, disorientation or hallucinations    ENT:    Denies epistaxis, headaches, vertigo or visual changes    Cardiovascular:   Denies any chest pain, irregular heartbeats, or palpitations. No paroxysmal nocturnal dyspnea. Respiratory:   Denies shortness of breath, coughing, sputum production, hemoptysis, or wheezing. No orthopnea. Gastrointestinal:   Denies nausea, vomiting, diarrhea, or constipation. Denies any abdominal pain. Denies change in bowel habits or stools. Genito-Urinary:    Denies any urgency, frequency, hematuria. Voiding without difficulty. Musculoskeletal:   Denies joint pain, joint stiffness, joint swelling or muscle pain    Neurology:    Denies any headache or focal neurological deficits. No weakness or paresthesia. Derm:    Denies any rashes, ulcers, or excoriations. Denies bruising. Extremities:   Denies any lower extremity swelling or edema. PHYSICAL EXAM: Abnormal findings noted  VITALS:  Vitals:    06/22/22 1225   BP:    Pulse:    Resp: 16   Temp:    SpO2: 100%         CONSTITUTIONAL:    Awake, alert, cooperative, no apparent distress, and appears stated age    EYES:    EOMI, sclera clear, conjunctiva normal    ENT:    Normocephalic, atraumatic, External ears without lesions. NECK:    Supple, symmetrical, trachea midline,  no JVD    HEMATOLOGIC/LYMPHATICS:    No cervical lymphadenopathy and no supraclavicular lymphadenopathy    LUNGS:    Symmetric.  No increased work of breathing, good air exchange, clear to auscultation bilaterally, no wheezes, rhonchi, or rales,     CARDIOVASCULAR:    Normal apical impulse, regular rate and rhythm, normal S1 and S2, no S3 or S4, and no murmur noted    ABDOMEN:     soft, non-distended, non-tender    MUSCULOSKELETAL:    There is no redness, warmth, or swelling of the joints. NEUROLOGIC:    Awake, alert, oriented to name, place and time. SKIN:    No bruising or bleeding. No redness, warmth, or swelling    EXTREMITIES:    Peripheral pulses present. No edema, cyanosis, or swelling. LINES/CATHETERS     LABORATORY DATA:  CBC with Differential:    Lab Results   Component Value Date    WBC 10.6 06/22/2022    RBC 4.75 06/22/2022    HGB 12.1 06/22/2022    HCT 40.3 06/22/2022     06/22/2022    MCV 84.8 06/22/2022    MCH 25.5 06/22/2022    MCHC 30.0 06/22/2022    RDW 13.4 06/22/2022    LYMPHOPCT 15.7 06/22/2022    MONOPCT 9.5 06/22/2022    BASOPCT 0.3 06/22/2022    MONOSABS 1.01 06/22/2022    LYMPHSABS 1.66 06/22/2022    EOSABS 0.42 06/22/2022    BASOSABS 0.03 06/22/2022     CMP:    Lab Results   Component Value Date     06/22/2022    K 4.2 06/22/2022    K 4.6 06/21/2022     06/22/2022    CO2 26 06/22/2022    BUN 9 06/22/2022    CREATININE 0.9 06/22/2022    GFRAA >60 06/22/2022    LABGLOM >60 06/22/2022    GLUCOSE 83 06/22/2022    PROT 7.3 06/22/2022    LABALBU 4.1 06/22/2022    CALCIUM 9.3 06/22/2022    BILITOT 0.3 06/22/2022    ALKPHOS 80 06/22/2022    AST 15 06/22/2022    ALT 17 06/22/2022       ASSESSMENT/PLAN:  1. Ambulatory dysfunction  2. History of seizure disorder with recent breakthrough seizure  3. ADHD  4. Schizophrenia  5. Autism disorder  6. Sleep apnea without use of CPAP/BiPAP    Patient presented due to symptoms following seizure yesterday. She states she has weakness and fatigue with associated ambulatory dysfunction. She also complains of neck pain and headache. Possible underlying mass versus stroke. Patient medication levels will be checked. Neurology will be consulted for further evaluation management.       Stephen Tony, [address] [place] [address]  4:38 PM  6/22/2022    Electronically signed by Stephen Tony DO on 6/22/22 at 4:38 PM EDT

## 2022-06-22 NOTE — ED NOTES
Pt states she has chest pain and feels like its hard to breath. Dr Hiren Conteh notified and requested EKG. EKG done and given to . Pt states she cannot walk because she is dizzy. Pt placed on bedpan at this time.      Santy Grossman RN  06/22/22 6731

## 2022-06-22 NOTE — PROGRESS NOTES
Pharmacist Review and Automatic Dose Adjustment of Prophylactic Enoxaparin    The reviewing pharmacist has made an adjustment to the ordered enoxaparin dose or converted to UFH per the approved [facility] protocol and table as identified below. Bere Asif is a 25 y.o. female. Recent Labs     06/21/22  1818 06/22/22  1032   CREATININE 0.9 0.9     Estimated Creatinine Clearance: 124 mL/min (based on SCr of 0.9 mg/dL). Recent Labs     06/21/22  1818 06/22/22  1032   HGB 12.7 12.1   HCT 41.3 40.3    401     No results for input(s): INR in the last 72 hours. Height:   Ht Readings from Last 1 Encounters:   06/21/22 5' 2\" (1.575 m)     Weight:  Wt Readings from Last 1 Encounters:   06/21/22 271 lb (122.9 kg)               Plan: Based upon the patient's weight and renal function, the ordered enoxaparin dose of 40mg SQ daily has been changed/converted to 30mg SQ BID.       Thank you,  Vida Gomez, [facility]  6/22/2022, 5:38 PM

## 2022-06-23 ENCOUNTER — APPOINTMENT (OUTPATIENT)
Dept: NEUROLOGY | Age: 22
End: 2022-06-23
Payer: COMMERCIAL

## 2022-06-23 PROBLEM — R56.9 SEIZURES (HCC): Status: ACTIVE | Noted: 2022-06-23

## 2022-06-23 LAB
ALBUMIN SERPL-MCNC: 3.9 G/DL (ref 3.5–5.2)
ALP BLD-CCNC: 73 U/L (ref 35–104)
ALT SERPL-CCNC: 13 U/L (ref 0–32)
ANION GAP SERPL CALCULATED.3IONS-SCNC: 9 MMOL/L (ref 7–16)
AST SERPL-CCNC: 14 U/L (ref 0–31)
BACTERIA: NORMAL /HPF
BASOPHILS ABSOLUTE: 0.05 E9/L (ref 0–0.2)
BASOPHILS RELATIVE PERCENT: 0.6 % (ref 0–2)
BILIRUB SERPL-MCNC: 0.3 MG/DL (ref 0–1.2)
BILIRUBIN URINE: NEGATIVE
BLOOD, URINE: NEGATIVE
BUN BLDV-MCNC: 8 MG/DL (ref 6–20)
CALCIUM SERPL-MCNC: 9.2 MG/DL (ref 8.6–10.2)
CHLORIDE BLD-SCNC: 104 MMOL/L (ref 98–107)
CLARITY: CLEAR
CO2: 24 MMOL/L (ref 22–29)
COLOR: YELLOW
CREAT SERPL-MCNC: 0.8 MG/DL (ref 0.5–1)
EOSINOPHILS ABSOLUTE: 0.43 E9/L (ref 0.05–0.5)
EOSINOPHILS RELATIVE PERCENT: 5.1 % (ref 0–6)
EPITHELIAL CELLS, UA: NORMAL /HPF
FOLATE: 15.4 NG/ML (ref 4.8–24.2)
GFR AFRICAN AMERICAN: >60
GFR NON-AFRICAN AMERICAN: >60 ML/MIN/1.73
GLUCOSE BLD-MCNC: 74 MG/DL (ref 74–99)
GLUCOSE URINE: NEGATIVE MG/DL
HCT VFR BLD CALC: 41.1 % (ref 34–48)
HEMOGLOBIN: 12.3 G/DL (ref 11.5–15.5)
IMMATURE GRANULOCYTES #: 0.08 E9/L
IMMATURE GRANULOCYTES %: 0.9 % (ref 0–5)
KETONES, URINE: NEGATIVE MG/DL
LEUKOCYTE ESTERASE, URINE: NEGATIVE
LITHIUM DOSE AMOUNT: ABNORMAL
LITHIUM LEVEL: 0.27 MMOL/L (ref 0.5–1.5)
LYMPHOCYTES ABSOLUTE: 2.13 E9/L (ref 1.5–4)
LYMPHOCYTES RELATIVE PERCENT: 25.3 % (ref 20–42)
MAGNESIUM: 2 MG/DL (ref 1.6–2.6)
MCH RBC QN AUTO: 25.3 PG (ref 26–35)
MCHC RBC AUTO-ENTMCNC: 29.9 % (ref 32–34.5)
MCV RBC AUTO: 84.4 FL (ref 80–99.9)
MONOCYTES ABSOLUTE: 0.79 E9/L (ref 0.1–0.95)
MONOCYTES RELATIVE PERCENT: 9.4 % (ref 2–12)
NEUTROPHILS ABSOLUTE: 4.95 E9/L (ref 1.8–7.3)
NEUTROPHILS RELATIVE PERCENT: 58.7 % (ref 43–80)
NITRITE, URINE: NEGATIVE
PDW BLD-RTO: 13.5 FL (ref 11.5–15)
PH UA: 7 (ref 5–9)
PHOSPHORUS: 4.6 MG/DL (ref 2.5–4.5)
PLATELET # BLD: 373 E9/L (ref 130–450)
PMV BLD AUTO: 9.8 FL (ref 7–12)
POTASSIUM SERPL-SCNC: 3.9 MMOL/L (ref 3.5–5)
PROCALCITONIN: 0.04 NG/ML (ref 0–0.08)
PROTEIN UA: NEGATIVE MG/DL
RBC # BLD: 4.87 E12/L (ref 3.5–5.5)
RBC UA: NORMAL /HPF (ref 0–2)
SODIUM BLD-SCNC: 137 MMOL/L (ref 132–146)
SPECIFIC GRAVITY UA: <=1.005 (ref 1–1.03)
T4 FREE: 0.92 NG/DL (ref 0.93–1.7)
TOTAL PROTEIN: 7 G/DL (ref 6.4–8.3)
TSH SERPL DL<=0.05 MIU/L-ACNC: 2.14 UIU/ML (ref 0.27–4.2)
UROBILINOGEN, URINE: 0.2 E.U./DL
VITAMIN B-12: 717 PG/ML (ref 211–946)
WBC # BLD: 8.4 E9/L (ref 4.5–11.5)
WBC UA: NORMAL /HPF (ref 0–5)

## 2022-06-23 PROCEDURE — 87088 URINE BACTERIA CULTURE: CPT

## 2022-06-23 PROCEDURE — 6360000002 HC RX W HCPCS: Performed by: INTERNAL MEDICINE

## 2022-06-23 PROCEDURE — 84145 PROCALCITONIN (PCT): CPT

## 2022-06-23 PROCEDURE — 82607 VITAMIN B-12: CPT

## 2022-06-23 PROCEDURE — 96375 TX/PRO/DX INJ NEW DRUG ADDON: CPT

## 2022-06-23 PROCEDURE — 97161 PT EVAL LOW COMPLEX 20 MIN: CPT

## 2022-06-23 PROCEDURE — 6370000000 HC RX 637 (ALT 250 FOR IP): Performed by: INTERNAL MEDICINE

## 2022-06-23 PROCEDURE — 84439 ASSAY OF FREE THYROXINE: CPT

## 2022-06-23 PROCEDURE — 80053 COMPREHEN METABOLIC PANEL: CPT

## 2022-06-23 PROCEDURE — 83735 ASSAY OF MAGNESIUM: CPT

## 2022-06-23 PROCEDURE — 97165 OT EVAL LOW COMPLEX 30 MIN: CPT

## 2022-06-23 PROCEDURE — 36415 COLL VENOUS BLD VENIPUNCTURE: CPT

## 2022-06-23 PROCEDURE — 84100 ASSAY OF PHOSPHORUS: CPT

## 2022-06-23 PROCEDURE — 84443 ASSAY THYROID STIM HORMONE: CPT

## 2022-06-23 PROCEDURE — 6360000002 HC RX W HCPCS

## 2022-06-23 PROCEDURE — G0378 HOSPITAL OBSERVATION PER HR: HCPCS

## 2022-06-23 PROCEDURE — 82746 ASSAY OF FOLIC ACID SERUM: CPT

## 2022-06-23 PROCEDURE — 96374 THER/PROPH/DIAG INJ IV PUSH: CPT

## 2022-06-23 PROCEDURE — 96372 THER/PROPH/DIAG INJ SC/IM: CPT

## 2022-06-23 PROCEDURE — 95816 EEG AWAKE AND DROWSY: CPT

## 2022-06-23 PROCEDURE — 80177 DRUG SCRN QUAN LEVETIRACETAM: CPT

## 2022-06-23 PROCEDURE — 2580000003 HC RX 258: Performed by: INTERNAL MEDICINE

## 2022-06-23 PROCEDURE — 80178 ASSAY OF LITHIUM: CPT

## 2022-06-23 PROCEDURE — 81001 URINALYSIS AUTO W/SCOPE: CPT

## 2022-06-23 PROCEDURE — 85025 COMPLETE CBC W/AUTO DIFF WBC: CPT

## 2022-06-23 RX ORDER — LORAZEPAM 2 MG/ML
1 INJECTION INTRAMUSCULAR ONCE
Status: COMPLETED | OUTPATIENT
Start: 2022-06-23 | End: 2022-06-23

## 2022-06-23 RX ORDER — LORAZEPAM 2 MG/ML
INJECTION INTRAMUSCULAR
Status: COMPLETED
Start: 2022-06-23 | End: 2022-06-23

## 2022-06-23 RX ADMIN — LEVETIRACETAM 1000 MG: 500 TABLET, FILM COATED ORAL at 21:22

## 2022-06-23 RX ADMIN — LITHIUM CARBONATE 300 MG: 300 CAPSULE, GELATIN COATED ORAL at 10:34

## 2022-06-23 RX ADMIN — Medication 10 ML: at 21:24

## 2022-06-23 RX ADMIN — ENOXAPARIN SODIUM 30 MG: 100 INJECTION SUBCUTANEOUS at 21:22

## 2022-06-23 RX ADMIN — ACETAMINOPHEN 650 MG: 325 TABLET ORAL at 21:22

## 2022-06-23 RX ADMIN — LITHIUM CARBONATE 300 MG: 300 CAPSULE, GELATIN COATED ORAL at 14:16

## 2022-06-23 RX ADMIN — Medication 10 ML: at 10:46

## 2022-06-23 RX ADMIN — ONDANSETRON 4 MG: 2 INJECTION INTRAMUSCULAR; INTRAVENOUS at 05:21

## 2022-06-23 RX ADMIN — LORAZEPAM 1 MG: 2 INJECTION INTRAMUSCULAR at 08:17

## 2022-06-23 RX ADMIN — ENOXAPARIN SODIUM 30 MG: 100 INJECTION SUBCUTANEOUS at 10:39

## 2022-06-23 RX ADMIN — LITHIUM CARBONATE 300 MG: 300 CAPSULE, GELATIN COATED ORAL at 17:15

## 2022-06-23 RX ADMIN — LORAZEPAM 1 MG: 2 INJECTION INTRAMUSCULAR; INTRAVENOUS at 08:17

## 2022-06-23 RX ADMIN — LEVETIRACETAM 1000 MG: 500 TABLET, FILM COATED ORAL at 10:30

## 2022-06-23 ASSESSMENT — PAIN SCALES - GENERAL
PAINLEVEL_OUTOF10: 0
PAINLEVEL_OUTOF10: 3

## 2022-06-23 ASSESSMENT — PAIN DESCRIPTION - LOCATION: LOCATION: HEAD

## 2022-06-23 NOTE — CARE COORDINATION
6/23/2022: SS Consult/Discharge plan: Observation:  SS Consult noted for \"return to facility tomorrow 6/24\" per chart review and nursing report pt presented with altered mental status from [facility], pt has history of ADHD, Autism Disorder, Schizophrenia and Seizures. Contacted [facility] regarding consult and informed that pt can NOT return there, Milvia Huerta states that pt was NOT appropriate for their unit due to her being \"low functioning\" and her having \"medical needs\". Pt currently has a sitter and no family present. Vm messages left for both pt's mother, Piper Prakash and her grandmother, Sandi Cotton, awaiting for calls back to discuss pt's transition of care options. Nursing notified. Electronically signed by SKYLAR Viramontes on 6/23/2022 at 12:15 PM

## 2022-06-23 NOTE — CONSULTS
History Of Present Illness: Harry Hull is a 25 y.o. female who was seen and examined today,6/23/2022, at the bedside. Harry Hull presented to [address] [place] [address] emergency department for the past 2 days for evaluation of breakthrough seizure. There is high clinical suspicion for outpatient noncompliance with seizure medications. She suffers from underlying psychiatric dysfunction at baseline and is an overall poor historian. As above per Dr Amador Dang. The patient is a 25 y.o. female with significant past medical history of seizures who presents with above. The patient has the following symptoms:    Change in level of consciousness: alert    New Weakness: no    Numbness or Tingling: no    Difficulty Swallowing: no    Current Medications:   Scheduled Meds:   sodium chloride flush  5-40 mL IntraVENous 2 times per day    levETIRAcetam  1,000 mg Oral BID    lithium  300 mg Oral TID WC    enoxaparin  30 mg SubCUTAneous BID     Continuous Infusions:   dextrose      sodium chloride       PRN Meds:glucose, dextrose bolus **OR** dextrose bolus, glucagon (rDNA), dextrose, sodium chloride flush, sodium chloride, ondansetron **OR** ondansetron, polyethylene glycol, acetaminophen **OR** acetaminophen    Allergies:  Dye [barium-containing compounds], Sulfa antibiotics, and Versed [midazolam]    Social History:   TOBACCO:   reports that she has never smoked. She has never used smokeless tobacco.  ETOH:   reports no history of alcohol use.     Past Medical History:        Diagnosis Date    ADHD     Autism disorder     Colitis     [facility]ia ([facility] [address] [facility]     Seizures ([facility] [address]        Past Surgical History:        Procedure Laterality Date    BREAST SURGERY Right     lumpectomy    LAPAROSCOPY Left 8/9/2019    LAPAROSCOPY, REMOVAL OF LEFT OVARIAN MASS, PERITONEAL WASHINGS FOR CELL BLOCK performed by Jasmine Ferguson MD at [address]         Outside reports reviewed: ER records, historical medical records, lab reports and radiology reports. Patient's medications, allergies, past medical, surgical, social and family histories were reviewed and updated as appropriate. Review of Systems  A comprehensive review of systems was negative except for:       Objective:     Neuro exam 138/70 p 94 t 98  General: normal orientation and alertness. Cranial nerve testing was normal.  Funduscopic eye exam revealed not testable. Motor exam: normal strength and muscle mass. Deep tendon reflexes were 1+ bilaterally. Plantar responses were flexor bilaterally. Cerebellar exam noted finger to nose without dysmetria. Sensation was normal to joint position sense, light touch and a pin prick . Hero Salinas Assessment:   Breakthrough seizures in patient with history of epilepsy and non compliance  Non focal exam with negative head ct      Plan:   EEG  keppra  Advised against driving--she reports that she does not drive secondary to seizure disorder.   [facility]

## 2022-06-23 NOTE — PROGRESS NOTES
[address]  [facility] [place]. [place]        Date:2022                                                  Patient Name: Bob El    MRN: [id number]    : 2000    Room: [address] [place] [address]      Evaluating OT: Eduin Noel OTR/L; [id number]     Referring Provider and Specific Provider Orders/Date:      22 08  OT eval and treat  Start:  22 0800,   End:  22 0800,   ONE TIME,   Standing Count:  1 Occurrences,   R         Janell Franklin, DO     Placement Recommendation: Subacute       Diagnosis:   1. Altered mental status, unspecified altered mental status type    2.  Generalized weakness         Surgery: none       Pertinent Medical History:       Past Medical History:   Diagnosis Date    ADHD     Autism disorder     Colitis     [facility]ia ([facility] [address]     Seizures ([facility] [address]          Past Surgical History:   Procedure Laterality Date    BREAST SURGERY Right     lumpectomy    LAPAROSCOPY Left 2019    LAPAROSCOPY, REMOVAL OF LEFT OVARIAN MASS, PERITONEAL WASHINGS FOR CELL BLOCK performed by Reanna Thompson MD at [address]      Precautions:  Fall Risk, seizure precautions, behavior       Assessment of current deficits:     [x] Functional mobility  [x]ADLs  [x] Strength               []Cognition    [x] Functional transfers   [x] IADLs         [x] Safety Awareness   [x]Endurance    [] Fine Coordination              [x] Balance      [] Vision/perception   []Sensation     []Gross Motor Coordination  [] ROM  [] Delirium                   [] Motor Control     OT PLAN OF CARE   OT POC based on physician orders, patient diagnosis and results of clinical assessment    Frequency/Duration 1-3 days/wk for 2 weeks PRN     Specific OT Treatment Interventions to include:   * Instruction/training on adapted ADL techniques and AE recommendations to increase functional independence within precautions       * Training on energy conservation strategies, correct breathing pattern and techniques to improve independence/tolerance for self-care routine  * Functional transfer/mobility training/DME recommendations for increased independence, safety, and fall prevention  * Patient/Family education to increase follow through with safety techniques and functional independence  * Recommendation of environmental modifications for increased safety with functional transfers/mobility and ADLs  * Therapeutic exercise to improve motor endurance, ROM, and functional strength for ADLs/functional transfers  * Therapeutic activities to facilitate/challenge dynamic balance, stand tolerance for increased safety and independence with ADLs  * Positioning to improve skin integrity, interaction with environment and functional independence    Recommended Adaptive Equipment: TBD      Home Living: pt came to us from [place], prior she resided with her mom and grandma. Single family home, resides in the basement, tub shower and walk in shower. Equipment owned: none    Prior Level of Function: Independent with ADLs , Independent with IADLs; ambulated with no device    Driving: no   Occupation: not working    Pain Level: 10/10 pain in head; Nursing notified.       Cognition: A&O: 3/4; Follows 1 step directions   Memory: fair    Sequencing: fair    Problem solving: fair minus    Judgement/safety: fair minus     [facility]   AM-PAC Daily Activity Inpatient   How much help for putting on and taking off regular lower body clothing?: Total  How much help for Bathing?: A Lot  How much help for Toileting?: A Little  How much help for putting on and taking off regular upper body clothing?: A Little  How much help for taking care of personal grooming?: A Little  How much help for eating meals?: None  AM-[facility] Inpatient Daily Activity Raw Score: 16  AM-PAC Inpatient ADL T-Scale Score : 35.96  ADL Inpatient CMS 0-100% Score: 53.32  ADL Inpatient CMS G-Code Modifier : CK     Functional Assessment:    Initial Eval Status  Date: 6/23/22 Treatment Status  Date: STGs = LTGs  Time frame: 10-14 days   Feeding Independent   Independent    Grooming Minimal Assist    Independent    UB Dressing Minimal Assist to doff and don hospital gown while seated EOB  Independent    LB Dressing Dependent   Independent    Bathing Moderate Assist  Independent    Toileting Minimal Assist for hygiene as pt was incontinent of urine in bed  Independent    Bed Mobility  Supine to sit: Maximal Assist   Sit to supine: Minimal Assist   Supine to sit: Independent   Sit to supine: Independent    Functional Transfers Maximal assist from EOB to wheeled walker upon 1st stand. Moderate Assist x 2 from EOB to wheeled walker upon 2nd stand. Transfer training with verbal cues for hand placement throughout session to improve safety. Independent    Functional Mobility Moderate assist x 2 with wheeled walker to improve balance, verbal cues for walker sequence and safety. Maximal verbal cues to place hands on walker, assistance to advance walker  Modified [place]    Balance Sitting:     Static: good     Dynamic: fair   Standing: fair  with wheeled walker     Activity Tolerance fair   good    Visual/  Perceptual Glasses: yes                 Hand Dominance: right      AROM (PROM) Strength Additional Info:    RUE  WFL 4/5 good  and wfl FMC/dexterity noted during ADL tasks     LUE WFL 4/5 good  and wfl FMC/dexterity noted during ADL tasks       Hearing: [place]   Sensation:  No c/o numbness or tingling  Tone: WFL   Edema: no    Comments: Upon arrival the patient was supine. At end of session, patient was supine with call light and phone within reach, all lines and tubes intact. Overall patient demonstrated decreased independence and safety during completion of ADL/functional transfer/mobility tasks.   Pt would benefit from continued skilled OT to increase safety and independence with completion of ADL/IADL tasks for functional independence and quality of life. Treatment: OT treatment provided this date includes:    Instruction/training on safety and adapted techniques for completion of ADLs    Instruction/training on safe functional mobility/transfer techniques    Instruction/training on energy conservation/work simplification for completion of ADLs    Proper Positioning/Alignment    Rehab Potential: Good for established goals. Patient / Family Goal: return home       Patient and/or family were instructed on functional diagnosis, prognosis/goals and OT plan of care. Demonstrated good understanding. Eval Complexity: Low    Time In: 9:25am   Time Out: 9:45am    Total Treatment Time: 0      Min Units   OT Eval Low 97165  X  1    OT Eval Medium 09291      OT Eval High 66325      OT Re-Eval [id number]            ADL/Self Care 69301     Therapeutic Activities 70149       Therapeutic Ex 72411       Orthotic Management 00967       Manual [id number]     Neuro Re-Ed 28460       Non-Billable Time        Evaluation Time additionally includes thorough review of current medical information, gathering information on past medical history/social history and prior level of function, interpretation of standardized testing/informal observation of tasks, assessment of data and development of plan of care and goals.         Evaluating OT: Isma Dillon OTR/L; [id number]

## 2022-06-23 NOTE — PROGRESS NOTES
Physical Therapy Initial Evaluation/Plan of Care    Room #:  8139/0014-82  Patient Name: Taylor Obregon  YOB: 2000  MRN: [id number]    Date of Service: 6/23/2022     Tentative placement recommendation: Subacute rehab  Equipment recommendation: To be determined      Evaluating Physical Therapist: Judit Boss #[id number]      Specific Provider Orders/Date/Referring Provider :   06/23/22 0800   PT eval and treat Start: 06/23/22 0800, End: 06/23/22 0800, ONE TIME, Standing Count: 1 Occurrences, R    Sharmin Dueñas DO      Admitting Diagnosis:   Altered mental status [R41.82]  Generalized weakness [R53.1]  Altered mental status, unspecified altered mental status type [R41.82]      Surgery: none  Visit Diagnoses       Codes    Generalized weakness     R53.1          Patient Active Problem List   Diagnosis    Pelvic mass in female    Post-op pain    Post-operative pain    Altered mental status    Seizures ([facility] [address]        ASSESSMENT of Current Deficits Patient exhibits decreased strength, balance and endurance impairing functional mobility, transfers and gait . Seizure precautions. Patient unsteady on feet and sitting EOB, maximal assist to remain upright when standing and taking side steps, dizziness noted. Per nursing report, patient became dependent when trying to ambulate earlier. The patient will benefit from continued skilled therapy to increase strength and improve balance for safe functional mobility, to decrease risk of falls, and to meet goals at discharge.         PHYSICAL THERAPY  PLAN OF CARE       Physical therapy plan of care is established based on physician order,  patient diagnosis and clinical assessment    Current Treatment Recommendations:    -Bed Mobility: Lower extremity exercises   -Sitting Balance: Incorporate reaching activities to activate trunk muscles   -Standing Balance: Perform strengthening exercises in standing to promote motor control with or without upper extremity support   -Transfers: Provide instruction on proper hand and foot position for adequate transfer of weight onto lower extremities and use of gait device if needed and Cues for hand placement, technique and safety. Provide stabilization to prevent fall   -Gait: Gait training and Standing activities to improve: base of support, weight shift, weight bearing    -Endurance: Utilize Supervised activities to increase level of endurance to allow for safe functional mobility including transfers and gait     PT long term treatment goals are located in below grid    Patient and or family understand(s) diagnosis, prognosis, and plan of care. Frequency of treatments: Patient will be seen  daily. Prior Level of Function: Patient ambulated independently   Rehab Potential: good  for baseline    Past medical history:   Past Medical History:   Diagnosis Date    ADHD     Autism disorder     Colitis     Schizophrenia ([facility] [address]     Seizures ([facility] [address]      Past Surgical History:   Procedure Laterality Date    BREAST SURGERY Right     lumpectomy    LAPAROSCOPY Left 8/9/2019    LAPAROSCOPY, REMOVAL OF LEFT OVARIAN MASS, PERITONEAL WASHINGS FOR CELL BLOCK performed by Kristin Chamberlain MD at [facility]:    Precautions: Up with assistance, falls , hx of seizures, psychiatric dysfunction at baseline, poor historian, austism, Schizophrenia and Seizures  Social history: Patient reports she lives with grandmother and mom in a ranch home  with 2 steps  to enter  [employer], poor historian.  Per chart review patient is from  [facility]     Equipment owned: None,      [address] [place] [address],[address] Mobility Inpatient   How much difficulty turning over in bed?: A Little  How much difficulty sitting down on / standing up from a chair with arms?: A Lot  How much difficulty moving from lying on back to sitting on side of bed?: A Lot  How much help from another person moving to and from a bed to a chair?: A Lot  How much help from another person needed to walk in hospital room?: A Lot  How much help from another person for climbing 3-5 steps with a railing?: Total  AM-PAC Inpatient Mobility Raw Score : 12  AM-PAC Inpatient T-Scale Score : 35.33  Mobility Inpatient CMS 0-100% Score: 68.66  Mobility Inpatient CMS G-Code Modifier : CL    Nursing cleared patient for PT evaluation. The admitting diagnosis and active problem list as listed above have been reviewed prior to the initiation of this evaluation. OBJECTIVE;   Initial Evaluation  Date: 6/23/2022 Treatment Date:     Short Term/ Long Term   Goals   Was pt agreeable to Eval/treatment? Yes  To be met in 5 days   Pain level   10/10  Headache      Bed Mobility    Rolling: Minimal assist of 1    Supine to sit: Moderate assist of 1    Sit to supine: Moderate assist of 1    Scooting: Moderate assist of 1    Rolling: Independent    Supine to sit: Independent    Sit to supine: Independent    Scooting: Independent     Transfers Sit to stand:  Moderate assist of 1   Sit to stand: Supervision     Ambulation    4 side steps using  wheeled walker with Maximal assist of 1   for walker control, walker approximation, balance, upright, weight shift, multiplane instability and safety   15 feet using  wheeled walker with Minimal assist of 1    Stair negotiation: ascended and descended   Not assessed      ROM Within functional limits    Increase range of motion 10% of affected joints    Strength BUE:  refer to OT eval  RLE:  3/5  LLE:  3/5  Increase strength in affected mm groups by 1/3 grade   Balance Sitting EOB:  fair -  Dynamic Standing:  poor   Sitting EOB:  good   Dynamic Standing: fair      Patient is Alert & Oriented x person, place and time and follows one step directions    Sensation:  Patient  denies numbness/tingling   Edema:  no   Endurance: fair     Vitals: room air   Blood Pressure at rest  Blood Pressure during session    Heart Rate at rest 110 Heart Rate during session SPO2 at rest 98%  SPO2 during session %     Patient education  Patient educated on role of Physical Therapy, risks of immobility, safety and plan of care,  importance of mobility while in hospital , ankle pumps, quad set and glut set for edema control, blood clot prevention, importance and purpose of adaptive device and adjusted to proper height for the patient. and safety      Patient response to education:   Pt verbalized understanding Pt demonstrated skill Pt requires further education in this area   Yes Partial Yes      Treatment:  Patient practiced and was instructed/facilitated in the following treatment: Patient assisted to EOB. Sat edge of bed 10 minutes with Supervision  to increase dynamic sitting balance and activity tolerance. Pt stood and took side steps. Patient very unsteady and knee buckling, multiplane instability. Therapeutic Exercises:  not performed  x  reps. At end of session, patient in bed with alarm and sitter call light and phone within reach,  all lines and tubes intact, nursing notified. Patient would benefit from continued skilled Physical Therapy to improve functional independence and quality of life. Patient's/ family goals   return to [place]    Time in  1132  Time out  1151    Total Treatment Time  0 minutes    Evaluation time includes thorough review of current medical information, gathering information on past medical history/social history and prior level of function, completion of standardized testing/informal observation of tasks, assessment of data, and development of Plan of care and goals.      CPT codes:  Low Complexity PT evaluation (83472)  No treatment    Davonte Beaver, PT

## 2022-06-23 NOTE — PROGRESS NOTES
Internal Medicine Progress Note    AXEL=[facility]    Thom Anderson. Jin Arguelles., MARIAH.ARENOMattyI. Rosas Torres D.O., VERÓNICA Saldana D.O. [facility], MSN, APRN, NP-C  Maria R Hammer. Evaristo Aguayo, MSN, APRN-CNP     Primary Care Physician: Ramona Ornelas DO   Admitting Physician:  Florina Gil DO  Admission date and time: 6/22/2022  8:49 AM    Room:  [address] [place] [address]  Admitting diagnosis: Altered mental status [R41.82]  Generalized weakness [R53.1]  Altered mental status, unspecified altered mental status type [R41.82]    Patient Name: Rose Marie Solis  MRN: [id number]    Date of Service: 6/23/2022     Subjective:  Larena Krabbe is a 25 y.o. female who was seen and examined today,6/23/2022, at the bedside. Larena Krabbe presented to [facility] emergency department for the past 2 days for evaluation of breakthrough seizure. There is high clinical suspicion for outpatient noncompliance with seizure medications. She suffers from underlying psychiatric dysfunction at baseline and is an overall poor historian. No family members were present during my examination. Review of System:   Constitutional:   Admits to generalized weakness and deconditioning. HEENT:   Denies ear pain, sore throat, sinus or eye problems. Cardiovascular:   Denies any chest pain, irregular heartbeats, or palpitations. Respiratory:   Denies shortness of breath, coughing, sputum production, hemoptysis, or wheezing. Gastrointestinal:   Denies nausea, vomiting, diarrhea, or constipation. Denies any abdominal pain. Genitourinary:    Denies any urgency, frequency, hematuria. Voiding  without difficulty. Extremities:   Denies lower extremity swelling, edema or cyanosis. Neurology:    Denies any headache or focal neurological deficits, Denies generalized weakness or memory difficulty. Psch:   Denies being anxious or depressed. Musculoskeletal:    Denies  myalgias, joint complaints or back pain.    Integumentary: Denies any rashes, ulcers, or excoriations. Denies bruising. Hematologic/Lymphatic:  Denies bruising or bleeding. Physical Exam:  No intake/output data recorded. Intake/Output Summary (Last 24 hours) at 6/23/2022 0810  Last data filed at 6/23/2022 0428  Gross per 24 hour   Intake --   Output 500 ml   Net -500 ml   I/O last 3 completed shifts:  In: -   Out: 500 [Urine:500]  No data found. Vital Signs:   Blood pressure 111/66, pulse 70, temperature 97.6 °F (36.4 °C), resp. rate 18, SpO2 96 %, not currently breastfeeding. General appearance:  Alert, responsive, oriented to person, place, and time. Well preserved, alert, no distress. Head:  Normocephalic. No masses, lesions or tenderness. Eyes:  PERRLA. EOMI. Sclera clear. Buccal mucosa moist.  ENT:  Ears normal. Mucosa normal.  Neck:    Supple. Trachea midline. No thyromegaly. No JVD. No bruits. Heart:    Rhythm regular. Rate controlled. No murmurs. Lungs:    Symmetrical. Clear to auscultation bilaterally. No wheezes. No rhonchi. No rales. Abdomen:   Soft. Non-tender. Non-distended. Bowel sounds positive. No organomegaly or masses. No pain on palpation. Extremities:    Peripheral pulses present. No peripheral edema. No ulcers. No cyanosis. No clubbing. Neurologic:    Alert x 3. No focal deficit. Cranial nerves grossly intact. No focal weakness. Psych:   Overall psychiatric dysfunction is identified. Slow to respond but answers questions accordingly. Musculoskeletal:   Spine ROM normal. Muscular strength intact. Gait not assessed. Integumentary:  No rashes  Skin normal color and texture.   Genitalia/Breast:  Deferred    Medication:  Scheduled Meds:   sodium chloride flush  5-40 mL IntraVENous 2 times per day    levETIRAcetam  1,000 mg Oral BID    lithium  300 mg Oral TID WC    enoxaparin  30 mg SubCUTAneous BID     Continuous Infusions:   dextrose      sodium chloride         Objective Data:  CBC with Differential:    Lab Results Component Value Date    WBC 8.4 06/23/2022    RBC 4.87 06/23/2022    HGB 12.3 06/23/2022    HCT 41.1 06/23/2022     06/23/2022    MCV 84.4 06/23/2022    MCH 25.3 06/23/2022    MCHC 29.9 06/23/2022    RDW 13.5 06/23/2022    LYMPHOPCT 25.3 06/23/2022    MONOPCT 9.4 06/23/2022    BASOPCT 0.6 06/23/2022    MONOSABS 0.79 06/23/2022    LYMPHSABS 2.13 06/23/2022    EOSABS 0.43 06/23/2022    BASOSABS 0.05 06/23/2022     BMP:    Lab Results   Component Value Date     06/23/2022    K 3.9 06/23/2022    K 4.6 06/21/2022     06/23/2022    CO2 24 06/23/2022    BUN 8 06/23/2022    LABALBU 3.9 06/23/2022    CREATININE 0.8 06/23/2022    CALCIUM 9.2 06/23/2022    GFRAA >60 06/23/2022    LABGLOM >60 06/23/2022    GLUCOSE 74 06/23/2022       Assessment:  1. Breakthrough seizure with known seizure disorder and suspected outpatient noncompliance with Keppra therapy  2. Schizophrenia with extensive psychiatric history  3. Obstructive sleep apnea    Plan:   Seizure medications have been resumed. She will work with the therapy teams today. She describes some dysuria and urinalysis will be obtained. Medication levels will be assessed. I anticipate discharge back to the facility tomorrow and have asked the social work team to investigate this further. Continue current therapy. See orders for further plan of care. More than 50% of my time was spent at the bedside counseling/coordinating care with the patient and/or family with face to face contact. This time was spent reviewing notes and laboratory data as well as instructing and counseling the patient. Time I spent with the family or surrogate(s) is included only if the patient was incapable of providing the necessary information or participating in medical decisions. I also discussed the differential diagnosis and all of the proposed management plans with the patient and individuals accompanying the patient.  I am readily available for any further

## 2022-06-24 LAB
ALBUMIN SERPL-MCNC: 3.9 G/DL (ref 3.5–5.2)
ALP BLD-CCNC: 72 U/L (ref 35–104)
ALT SERPL-CCNC: 40 U/L (ref 0–32)
ANION GAP SERPL CALCULATED.3IONS-SCNC: 9 MMOL/L (ref 7–16)
AST SERPL-CCNC: 29 U/L (ref 0–31)
BASOPHILS ABSOLUTE: 0.05 E9/L (ref 0–0.2)
BASOPHILS RELATIVE PERCENT: 0.5 % (ref 0–2)
BILIRUB SERPL-MCNC: 0.2 MG/DL (ref 0–1.2)
BUN BLDV-MCNC: 9 MG/DL (ref 6–20)
CALCIUM SERPL-MCNC: 9.3 MG/DL (ref 8.6–10.2)
CHLORIDE BLD-SCNC: 108 MMOL/L (ref 98–107)
CO2: 24 MMOL/L (ref 22–29)
CREAT SERPL-MCNC: 0.9 MG/DL (ref 0.5–1)
EOSINOPHILS ABSOLUTE: 0.36 E9/L (ref 0.05–0.5)
EOSINOPHILS RELATIVE PERCENT: 3.8 % (ref 0–6)
GFR AFRICAN AMERICAN: >60
GFR NON-AFRICAN AMERICAN: >60 ML/MIN/1.73
GLUCOSE BLD-MCNC: 93 MG/DL (ref 74–99)
HCT VFR BLD CALC: 38.4 % (ref 34–48)
HEMOGLOBIN: 11.8 G/DL (ref 11.5–15.5)
IMMATURE GRANULOCYTES #: 0.15 E9/L
IMMATURE GRANULOCYTES %: 1.6 % (ref 0–5)
LYMPHOCYTES ABSOLUTE: 2.75 E9/L (ref 1.5–4)
LYMPHOCYTES RELATIVE PERCENT: 29.1 % (ref 20–42)
MCH RBC QN AUTO: 25.7 PG (ref 26–35)
MCHC RBC AUTO-ENTMCNC: 30.7 % (ref 32–34.5)
MCV RBC AUTO: 83.7 FL (ref 80–99.9)
MONOCYTES ABSOLUTE: 1.03 E9/L (ref 0.1–0.95)
MONOCYTES RELATIVE PERCENT: 10.9 % (ref 2–12)
NEUTROPHILS ABSOLUTE: 5.12 E9/L (ref 1.8–7.3)
NEUTROPHILS RELATIVE PERCENT: 54.1 % (ref 43–80)
PDW BLD-RTO: 13.6 FL (ref 11.5–15)
PLATELET # BLD: 363 E9/L (ref 130–450)
PMV BLD AUTO: 9.8 FL (ref 7–12)
POTASSIUM SERPL-SCNC: 3.9 MMOL/L (ref 3.5–5)
RBC # BLD: 4.59 E12/L (ref 3.5–5.5)
SODIUM BLD-SCNC: 141 MMOL/L (ref 132–146)
TOTAL PROTEIN: 6.8 G/DL (ref 6.4–8.3)
WBC # BLD: 9.5 E9/L (ref 4.5–11.5)

## 2022-06-24 PROCEDURE — 90792 PSYCH DIAG EVAL W/MED SRVCS: CPT

## 2022-06-24 PROCEDURE — 6370000000 HC RX 637 (ALT 250 FOR IP): Performed by: INTERNAL MEDICINE

## 2022-06-24 PROCEDURE — 6360000002 HC RX W HCPCS: Performed by: INTERNAL MEDICINE

## 2022-06-24 PROCEDURE — G0378 HOSPITAL OBSERVATION PER HR: HCPCS

## 2022-06-24 PROCEDURE — 80053 COMPREHEN METABOLIC PANEL: CPT

## 2022-06-24 PROCEDURE — 96376 TX/PRO/DX INJ SAME DRUG ADON: CPT

## 2022-06-24 PROCEDURE — 36415 COLL VENOUS BLD VENIPUNCTURE: CPT

## 2022-06-24 PROCEDURE — 85025 COMPLETE CBC W/AUTO DIFF WBC: CPT

## 2022-06-24 PROCEDURE — 96372 THER/PROPH/DIAG INJ SC/IM: CPT

## 2022-06-24 RX ORDER — MECLIZINE HCL 12.5 MG/1
12.5 TABLET ORAL ONCE
Status: COMPLETED | OUTPATIENT
Start: 2022-06-24 | End: 2022-06-24

## 2022-06-24 RX ADMIN — LITHIUM CARBONATE 300 MG: 300 CAPSULE, GELATIN COATED ORAL at 19:16

## 2022-06-24 RX ADMIN — LEVETIRACETAM 1000 MG: 500 TABLET, FILM COATED ORAL at 10:15

## 2022-06-24 RX ADMIN — ONDANSETRON 4 MG: 4 TABLET, ORALLY DISINTEGRATING ORAL at 10:16

## 2022-06-24 RX ADMIN — LEVETIRACETAM 1000 MG: 500 TABLET, FILM COATED ORAL at 20:46

## 2022-06-24 RX ADMIN — LITHIUM CARBONATE 300 MG: 300 CAPSULE, GELATIN COATED ORAL at 10:16

## 2022-06-24 RX ADMIN — ENOXAPARIN SODIUM 30 MG: 100 INJECTION SUBCUTANEOUS at 10:05

## 2022-06-24 RX ADMIN — ACETAMINOPHEN 650 MG: 325 TABLET ORAL at 17:59

## 2022-06-24 RX ADMIN — MECLIZINE 12.5 MG: 12.5 TABLET ORAL at 23:12

## 2022-06-24 RX ADMIN — LITHIUM CARBONATE 300 MG: 300 CAPSULE, GELATIN COATED ORAL at 15:03

## 2022-06-24 RX ADMIN — ONDANSETRON 4 MG: 2 INJECTION INTRAMUSCULAR; INTRAVENOUS at 01:44

## 2022-06-24 ASSESSMENT — PAIN DESCRIPTION - LOCATION: LOCATION: ABDOMEN

## 2022-06-24 ASSESSMENT — PAIN SCALES - GENERAL
PAINLEVEL_OUTOF10: 7
PAINLEVEL_OUTOF10: 10
PAINLEVEL_OUTOF10: 0

## 2022-06-24 NOTE — PLAN OF CARE
Problem: Discharge Planning  Goal: Discharge to home or other facility with appropriate resources  6/23/2022 2326 by Urvashi Pagan RN  Outcome: Progressing  6/23/2022 1100 by Andrews Abel RN  Outcome: Progressing     Problem: Pain  Goal: Verbalizes/displays adequate comfort level or baseline comfort level  6/23/2022 2326 by Urvashi Pagan RN  Outcome: Progressing  6/23/2022 1100 by Andrews Abel RN  Outcome: Progressing     Problem: Safety - Adult  Goal: Free from fall injury  6/23/2022 2326 by Urvashi Pagan RN  Outcome: Progressing  6/23/2022 1100 by Andrews Abel RN  Outcome: Progressing     Problem: ABCDS Injury Assessment  Goal: Absence of physical injury  6/23/2022 2326 by Urvashi Pagan RN  Outcome: Progressing  6/23/2022 1100 by Andrews Abel RN  Outcome: Progressing

## 2022-06-24 NOTE — PROCEDURES
[address]                          ELECTROENCEPHALOGRAM REPORT    PATIENT NAME: Hernandez Kaiser                   :        2000  MED REC NO:   [id number]                            ROOM:       5078  ACCOUNT NO:   [de-identified]                           ADMIT DATE: 2022  PROVIDER:     Nadia Rogers MD    DATE OF EE2022    PLACE OF SERVICE:  [facility]. EEG DIAGNOSIS:  Awake normal sleep normal report. FINDINGS:  A 19-channel EEG was recorded and demonstrates a background  alpha rhythm of 8 Hz following eye closure. Hyperventilation and photic  stimulation were not performed. During the sleep portion of the record,  there is no activation. INTERPRETATION:  Normal awake and sleep EEG. A normal EEG does not rule  out seizure disorder. Clinical correlation advised.         Luca Beltran MD    D: 2022 9:08:36       T: 2022 9:12:37     [email or web address]  Job#: [id number]     Doc#: [id number]    CC:
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
## 2022-06-24 NOTE — CARE COORDINATION
6/24/2022: SS Consult/Discharge plan: Observation:  Per Psych consult from this morning pt was found to be appropriate to return home and to [place], currently having no suicidal or homicidal ideations, no need for a psychiatric admission, recommend o/p mental health follow up, sw spoke with pt's cw Surendra Segal at [facility] who relayed that pt follows at [facility] for her o/p mental health treatment, vm message left for [facility] liaison to contact pt's mother to arrange a follow up o/p appointment, vm message left for pt's mother, Laurel Holloway regarding consult and recommendations and for her to call the nurses station when she arrives home to confirm discharge plan for pt to return home this evening,  nursing informed. Electronically signed by SKYLAR Alvarado on 6/24/2022 at 11:27 AM

## 2022-06-24 NOTE — DISCHARGE SUMMARY
Internal Medicine Progress Note     [facility]     Amalia Doe. Lissa Soto, VERÓNICA Junior D.O., VERÓNICA Serrano D.O. José Antonoi Tony, MSN, APRN, NP-C  Carmen Lemons. Rin Major, MSN, APRN-CNP       Internal Medicine  Discharge Summary    NAME: Taylor Obregon  :  2000  MRN:  [id number]  PCP:Cristo Morelos DO  ADMITTED: 2022      DISCHARGED: 22    ADMITTING PHYSICIAN: Sharmin Dueñas DO    CONSULTANT(S):   IP CONSULT TO NEUROLOGY  IP CONSULT TO SOCIAL WORK  IP CONSULT TO SOCIAL WORK  IP CONSULT TO PSYCHIATRY  IP CONSULT TO DIETITIAN     ADMITTING DIAGNOSIS:   Altered mental status [R41.82]  Generalized weakness [R53.1]  Altered mental status, unspecified altered mental status type [R41.82]     DISCHARGE DIAGNOSES:   1. Breakthrough seizure with known seizure disorder and suspected outpatient noncompliance with Keppra therapy  2. Schizophrenia with extensive psychiatric history  3. Obstructive sleep apnea      BRIEF HISTORY OF PRESENT ILLNESS:     Patient is 81-year-old female who presented to the ED due to fatigue and ambulatory dysfunction. Patient states she suffered a seizure yesterday. Following seizure she continued to experience weakness and fatigue. She describes it as sharp and pounding. She also admits to slurred speech, lightheadedness, and unsteady gait. Patient states that she has been taking her seizure medications as prescribed. Denies any previous occurrence following seizure.   However she states that she has had similar headache in the past.    LABS[de-identified]  Lab Results   Component Value Date    WBC 9.5 2022    HGB 11.8 2022    HCT 38.4 2022     2022     2022    K 3.9 2022     (H) 2022    CREATININE 0.9 2022    BUN 9 2022    CO2 24 2022    GLUCOSE 93 2022    ALT 40 (H) 2022    AST 29 2022     No results found for: INR, PROTIME   Lab Results   Component Value Date    TSH 2.140 06/23/2022     No results found for: TRIG  No results found for: HDL  No results found for: LDLCALC  No results found for: LABA1C    IMAGING:  CT HEAD WO CONTRAST    Result Date: 6/22/2022  EXAMINATION: CT OF THE HEAD WITHOUT CONTRAST  6/22/2022 2:52 pm TECHNIQUE: CT of the head was performed without the administration of intravenous contrast. Automated exposure control, iterative reconstruction, and/or weight based adjustment of the mA/kV was utilized to reduce the radiation dose to as low as reasonably achievable. COMPARISON: None. HISTORY: ORDERING SYSTEM PROVIDED HISTORY: generalized weakness; recent seizure TECHNOLOGIST PROVIDED HISTORY: Reason for exam:->generalized weakness; recent seizure Has a \"code stroke\" or \"stroke alert\" been called? ->No Decision Support Exception - unselect if not a suspected or confirmed emergency medical condition->Emergency Medical Condition (MA) FINDINGS: BRAIN/VENTRICLES: There is no acute intracranial hemorrhage, mass effect or midline shift. No abnormal extra-axial fluid collection. The gray-white differentiation is maintained without evidence of an acute infarct. There is no evidence of hydrocephalus. ORBITS: The visualized portion of the orbits demonstrate no acute abnormality. SINUSES: The visualized paranasal sinuses and mastoid air cells demonstrate no acute abnormality. SOFT TISSUES/SKULL:  No acute abnormality of the visualized skull or soft tissues. No acute intracranial abnormality. RECOMMENDATIONS: Unavailable     CT Head WO Contrast    Result Date: 6/21/2022  EXAMINATION: CT OF THE HEAD WITHOUT CONTRAST  6/21/2022 6:41 pm TECHNIQUE: CT of the head was performed without the administration of intravenous contrast. Automated exposure control, iterative reconstruction, and/or weight based adjustment of the mA/kV was utilized to reduce the radiation dose to as low as reasonably achievable. COMPARISON: None. Resp 16   SpO2 95%     General appearance:  Alert, responsive, oriented to person, place, and time. Well preserved, alert, no distress. Head:  Normocephalic. No masses, lesions or tenderness. Eyes:  PERRLA. EOMI. Sclera clear. Buccal mucosa moist.  ENT:  Ears normal. Mucosa normal.  Neck:    Supple. Trachea midline. No thyromegaly. No JVD. No bruits. Heart:    Rhythm regular. Rate controlled. No murmurs. Lungs:    Symmetrical. Clear to auscultation bilaterally. No wheezes. No rhonchi. No rales. Abdomen:   Soft. Non-tender. Non-distended. Bowel sounds positive. No organomegaly or masses. No pain on palpation. Extremities:    Peripheral pulses present. No peripheral edema. No ulcers. No cyanosis. No clubbing. Neurologic:    Alert x 3. No focal deficit. Cranial nerves grossly intact. No focal weakness. Psych:   Overall psychiatric dysfunction is identified. Slow to respond but answers questions accordingly. Musculoskeletal:   Spine ROM normal. Muscular strength intact. Gait not assessed. Integumentary:  No rashes  Skin normal color and texture. Genitalia/Breast:  Deferred    DISPOSITION:  The patient's condition is good. At this time the patient is without objective evidence of an acute process requiring continuing hospitalization or inpatient management. They are stable for discharge with outpatient follow-up. I have spoken with the patient and discussed the results of the current hospitalization, in addition to providing specific details for the plan of care and counseling regarding the diagnosis and prognosis. The plan has been discussed in detail and they are aware of the specific conditions for emergent return, as well as the importance of follow-up.   Their questions are answered at this time and they are agreeable with the plan for discharge to home    DISCHARGE MEDICATIONS:   Current Discharge Medication List           Details   lithium 300 MG capsule Take 300 mg by mouth 3 times daily (with meals)      levETIRAcetam (KEPPRA) 1000 MG tablet Take 1,000 mg by mouth 2 times daily       pantoprazole (PROTONIX) 40 MG tablet Take 40 mg by mouth daily              FOLLOW UP/INSTRUCTIONS:  · This patient is instructed to follow-up with her primary care physician. · Patient is instructed to follow-up with the consults listed above as directed by them. · she is instructed to resume home medications and take new medications as indicated in the list above. · If the patient has a recurrence of symptoms, she is instructed to go to the ED. Preparing for this patient's discharge, including paperwork, orders, instructions, and meeting with patient did require > 40 minutes.     Farzad Garcia DO   6/24/2022  1:32 PM

## 2022-06-24 NOTE — PROGRESS NOTES
Internal Medicine Progress Note    [facility]    Ana Paula Jerry. ED AlejandraA.CMattyOMattyIMatty Moya D.O., IBRAHIMAOSANDRINE Schofield D.O. Sofy Posada, MSN, APRN, NP-C  Barbra Iglesias. Sienna Junior, MSN, APRN-CNP     Primary Care Physician: Alex Loomis DO   Admitting Physician:  Horacio Booker DO  Admission date and time: 6/22/2022  8:49 AM    Room:  0322/0322-02  Admitting diagnosis: Altered mental status [R41.82]  Generalized weakness [R53.1]  Altered mental status, unspecified altered mental status type [R41.82]    Patient Name: Erlinda Calvert  MRN: [id number]    Date of Service: 6/24/2022     Subjective:  Galen Chavarria is a 25 y.o. female who was seen and examined today,6/24/2022, at the bedside. Galen Chavarria is resting far more comfortably during my examination today and is likely approaching her baseline. No family members were present during my examination. Review of System:   Constitutional:   Admits to generalized weakness and deconditioning. HEENT:   Denies ear pain, sore throat, sinus or eye problems. Cardiovascular:   Denies any chest pain, irregular heartbeats, or palpitations. Respiratory:   Denies shortness of breath, coughing, sputum production, hemoptysis, or wheezing. Gastrointestinal:   Denies nausea, vomiting, diarrhea, or constipation. Denies any abdominal pain. Admits to a decreased appetite and therefore decreased oral intake. Genitourinary:    Denies any urgency, frequency, hematuria. Voiding  without difficulty. Extremities:   Denies lower extremity swelling, edema or cyanosis. Neurology:    Denies any headache or focal neurological deficits, Denies generalized weakness or memory difficulty. Psch:   Denies being anxious or depressed. Musculoskeletal:    Denies  myalgias, joint complaints or back pain. Integumentary:   Denies any rashes, ulcers, or excoriations. Denies bruising.   Hematologic/Lymphatic:  Denies bruising or bleeding. Physical Exam:  No intake/output data recorded. Intake/Output Summary (Last 24 hours) at 6/24/2022 0738  Last data filed at 6/24/2022 0025  Gross per 24 hour   Intake 900 ml   Output 2000 ml   Net -1100 ml   I/O last 3 completed shifts: In: 900 [P.O.:900]  Out: 2500 [Urine:2500]  No data found. Vital Signs:   Blood pressure 120/83, pulse 89, temperature 98.4 °F (36.9 °C), temperature source Oral, resp. rate 16, SpO2 97 %, not currently breastfeeding. General appearance:  Alert, responsive, oriented to person, place, and time. Well preserved, alert, no distress. Head:  Normocephalic. No masses, lesions or tenderness. Eyes:  PERRLA. EOMI. Sclera clear. Buccal mucosa moist.  ENT:  Ears normal. Mucosa normal.  Neck:    Supple. Trachea midline. No thyromegaly. No JVD. No bruits. Heart:    Rhythm regular. Rate controlled. No murmurs. Lungs:    Symmetrical. Clear to auscultation bilaterally. No wheezes. No rhonchi. No rales. Abdomen:   Soft. Non-tender. Non-distended. Bowel sounds positive. No organomegaly or masses. No pain on palpation. Extremities:    Peripheral pulses present. No peripheral edema. No ulcers. No cyanosis. No clubbing. Neurologic:    Alert x 3. No focal deficit. Cranial nerves grossly intact. No focal weakness. Psych:   Overall psychiatric dysfunction is identified. Slow to respond but answers questions accordingly. Musculoskeletal:   Spine ROM normal. Muscular strength intact. Gait not assessed. Integumentary:  No rashes  Skin normal color and texture.   Genitalia/Breast:  Deferred    Medication:  Scheduled Meds:   sodium chloride flush  5-40 mL IntraVENous 2 times per day    levETIRAcetam  1,000 mg Oral BID    lithium  300 mg Oral TID WC    enoxaparin  30 mg SubCUTAneous BID     Continuous Infusions:   dextrose      sodium chloride         Objective Data:  CBC with Differential:    Lab Results   Component Value Date    WBC 9.5 06/24/2022    RBC 4.59 06/24/2022    HGB 11.8 06/24/2022    HCT 38.4 06/24/2022     06/24/2022    MCV 83.7 06/24/2022    MCH 25.7 06/24/2022    MCHC 30.7 06/24/2022    RDW 13.6 06/24/2022    LYMPHOPCT 29.1 06/24/2022    MONOPCT 10.9 06/24/2022    BASOPCT 0.5 06/24/2022    MONOSABS 1.03 06/24/2022    LYMPHSABS 2.75 06/24/2022    EOSABS 0.36 06/24/2022    BASOSABS 0.05 06/24/2022     BMP:    Lab Results   Component Value Date     06/24/2022    K 3.9 06/24/2022    K 4.6 06/21/2022     06/24/2022    CO2 24 06/24/2022    BUN 9 06/24/2022    LABALBU 3.9 06/24/2022    CREATININE 0.9 06/24/2022    CALCIUM 9.3 06/24/2022    GFRAA >60 06/24/2022    LABGLOM >60 06/24/2022    GLUCOSE 93 06/24/2022       Assessment:  1. Breakthrough seizure with known seizure disorder and suspected outpatient noncompliance with Keppra therapy  2. Schizophrenia with extensive psychiatric history  3. Obstructive sleep apnea    Plan:   Psychiatry has been asked to provide consultation to assist with the final discharge plan. Otherwise, I believe the patient is approaching her chronically debilitated baseline. Seizures are currently controlled. She will work with the therapy teams today and diet will be advanced. She is acceptable for discharge once this can be arranged. More than 50% of my time was spent at the bedside counseling/coordinating care with the patient and/or family with face to face contact. This time was spent reviewing notes and laboratory data as well as instructing and counseling the patient. Time I spent with the family or surrogate(s) is included only if the patient was incapable of providing the necessary information or participating in medical decisions. I also discussed the differential diagnosis and all of the proposed management plans with the patient and individuals accompanying the patient. I am readily available for any further decision-making and intervention.        Farzad Garcia DO, F.A.C.O.I.  6/24/2022  7:38 AM

## 2022-06-25 VITALS
HEART RATE: 94 BPM | OXYGEN SATURATION: 98 % | RESPIRATION RATE: 18 BRPM | DIASTOLIC BLOOD PRESSURE: 79 MMHG | TEMPERATURE: 97.8 F | SYSTOLIC BLOOD PRESSURE: 141 MMHG

## 2022-06-25 LAB
ALBUMIN SERPL-MCNC: 4.4 G/DL (ref 3.5–5.2)
ALP BLD-CCNC: 76 U/L (ref 35–104)
ALT SERPL-CCNC: 55 U/L (ref 0–32)
ANION GAP SERPL CALCULATED.3IONS-SCNC: 11 MMOL/L (ref 7–16)
AST SERPL-CCNC: 35 U/L (ref 0–31)
BASOPHILS ABSOLUTE: 0 E9/L (ref 0–0.2)
BASOPHILS RELATIVE PERCENT: 0.3 % (ref 0–2)
BILIRUB SERPL-MCNC: <0.2 MG/DL (ref 0–1.2)
BUN BLDV-MCNC: 8 MG/DL (ref 6–20)
BURR CELLS: ABNORMAL
CALCIUM SERPL-MCNC: 9.7 MG/DL (ref 8.6–10.2)
CHLORIDE BLD-SCNC: 103 MMOL/L (ref 98–107)
CO2: 23 MMOL/L (ref 22–29)
CREAT SERPL-MCNC: 0.9 MG/DL (ref 0.5–1)
EKG ATRIAL RATE: 115 BPM
EKG P AXIS: 35 DEGREES
EKG P-R INTERVAL: 140 MS
EKG Q-T INTERVAL: 308 MS
EKG QRS DURATION: 76 MS
EKG QTC CALCULATION (BAZETT): 426 MS
EKG R AXIS: 46 DEGREES
EKG T AXIS: 0 DEGREES
EKG VENTRICULAR RATE: 115 BPM
EOSINOPHILS ABSOLUTE: 0 E9/L (ref 0.05–0.5)
EOSINOPHILS RELATIVE PERCENT: 0.8 % (ref 0–6)
GFR AFRICAN AMERICAN: >60
GFR NON-AFRICAN AMERICAN: >60 ML/MIN/1.73
GLUCOSE BLD-MCNC: 119 MG/DL (ref 74–99)
HCT VFR BLD CALC: 39.9 % (ref 34–48)
HEMOGLOBIN: 12.3 G/DL (ref 11.5–15.5)
KEPPRA: 20 UG/ML (ref 10–40)
LYMPHOCYTES ABSOLUTE: 0.78 E9/L (ref 1.5–4)
LYMPHOCYTES RELATIVE PERCENT: 7.8 % (ref 20–42)
MCH RBC QN AUTO: 25.3 PG (ref 26–35)
MCHC RBC AUTO-ENTMCNC: 30.8 % (ref 32–34.5)
MCV RBC AUTO: 82.1 FL (ref 80–99.9)
METAMYELOCYTES RELATIVE PERCENT: 0.9 % (ref 0–1)
METER GLUCOSE: 112 MG/DL (ref 74–99)
MONOCYTES ABSOLUTE: 0.88 E9/L (ref 0.1–0.95)
MONOCYTES RELATIVE PERCENT: 8.7 % (ref 2–12)
NEUTROPHILS ABSOLUTE: 8.23 E9/L (ref 1.8–7.3)
NEUTROPHILS RELATIVE PERCENT: 82.6 % (ref 43–80)
PDW BLD-RTO: 13.4 FL (ref 11.5–15)
PLATELET # BLD: 374 E9/L (ref 130–450)
PMV BLD AUTO: 9.5 FL (ref 7–12)
POIKILOCYTES: ABNORMAL
POLYCHROMASIA: ABNORMAL
POTASSIUM SERPL-SCNC: 3.9 MMOL/L (ref 3.5–5)
RBC # BLD: 4.86 E12/L (ref 3.5–5.5)
SCHISTOCYTES: ABNORMAL
SODIUM BLD-SCNC: 137 MMOL/L (ref 132–146)
TEAR DROP CELLS: ABNORMAL
TOTAL PROTEIN: 7.8 G/DL (ref 6.4–8.3)
URINE CULTURE, ROUTINE: NORMAL
WBC # BLD: 9.8 E9/L (ref 4.5–11.5)

## 2022-06-25 PROCEDURE — 93005 ELECTROCARDIOGRAM TRACING: CPT | Performed by: FAMILY MEDICINE

## 2022-06-25 PROCEDURE — 82962 GLUCOSE BLOOD TEST: CPT

## 2022-06-25 PROCEDURE — 6370000000 HC RX 637 (ALT 250 FOR IP): Performed by: INTERNAL MEDICINE

## 2022-06-25 PROCEDURE — 85025 COMPLETE CBC W/AUTO DIFF WBC: CPT

## 2022-06-25 PROCEDURE — 99291 CRITICAL CARE FIRST HOUR: CPT | Performed by: FAMILY MEDICINE

## 2022-06-25 PROCEDURE — G0378 HOSPITAL OBSERVATION PER HR: HCPCS

## 2022-06-25 PROCEDURE — 80053 COMPREHEN METABOLIC PANEL: CPT

## 2022-06-25 PROCEDURE — 36415 COLL VENOUS BLD VENIPUNCTURE: CPT

## 2022-06-25 PROCEDURE — 96376 TX/PRO/DX INJ SAME DRUG ADON: CPT

## 2022-06-25 PROCEDURE — 6360000002 HC RX W HCPCS: Performed by: INTERNAL MEDICINE

## 2022-06-25 RX ADMIN — ACETAMINOPHEN 650 MG: 325 TABLET ORAL at 05:10

## 2022-06-25 RX ADMIN — LEVETIRACETAM 1000 MG: 500 TABLET, FILM COATED ORAL at 09:51

## 2022-06-25 RX ADMIN — ONDANSETRON 4 MG: 2 INJECTION INTRAMUSCULAR; INTRAVENOUS at 08:18

## 2022-06-25 ASSESSMENT — PAIN SCALES - GENERAL: PAINLEVEL_OUTOF10: 3

## 2022-06-25 NOTE — PROGRESS NOTES
Patients mother Kamari Sutherland called up to patients room, and stated she was here to pick her up. Patient asked to use the restroom before discharge. Patient became dizzy, and unsteady gait noted. Contact Dr. Josr Ely BP requested. Patient attempted to call her mother; unable to reach her. Patients mother Andres Blood called patients room. Disagreement heard between patient and mother. Patient visibly upset stating, \"I'm not faking, why don't you believe me? \" patients mother told patient she would pick her up in the am. Patients orthostatic BP relayed to Dr. Tommy Gonzalez, meclizine 12.5 mg one time dose ordered for patients dizziness. 0300 Patient found to be slumped over in bed. Stated patients name with no respose, patient eyes glossy/gazzing, inability to follow commands, decline in baseline level. RAPID called. HR in 120s. Patient was noted to come to after a period of time. Patient was noted to be rocking back and forth and crying \"mama\". Telemetry order in place, EKG obtained.

## 2022-06-25 NOTE — PROGRESS NOTES
Attempted to call patients mother Mira Campbell, unable to reach parent d/t phone going straight to voicemail. Voicemail left for patients mother. Attempted to contact patients grandmother Jackelin Godoy, phone also went straight to voicemail.

## 2022-06-25 NOTE — PROGRESS NOTES
Internal Medicine Progress Note    [facility]    Thom Anderson. Jin Arguelles., F.A.CMattyOMattyI. Rosas Torres D.O., VERÓNICA Saldana D.O. [facility], MSN, APRN, NP-C  Maria R Hammer. Evaristo Aguayo, MSN, APRN-CNP     Primary Care Physician: Ramona Ornelas DO   Admitting Physician:  Florina Gil DO  Admission date and time: 6/22/2022  8:49 AM    Room:  0322/0322-02  Admitting diagnosis: Altered mental status [R41.82]  Generalized weakness [R53.1]  Altered mental status, unspecified altered mental status type [R41.82]    Patient Name: Rose Marie Solis  MRN: [id number]    Date of Service: 6/25/2022     Subjective:  Larena Krabbe is a 25 y.o. female who was seen and examined today,6/25/2022, at the bedside. Larena Krabbe was scheduled to leave last evening with her mother. Her mother now states she will pick her up this morning. Her condition is largely unchanged. Events of the night have been reviewed. Review of System:   Constitutional:   Admits to generalized weakness and deconditioning. HEENT:   Denies ear pain, sore throat, sinus or eye problems. Cardiovascular:   Denies any chest pain, irregular heartbeats, or palpitations. Respiratory:   Denies shortness of breath, coughing, sputum production, hemoptysis, or wheezing. Gastrointestinal:   Intact appetite with increased oral ingestion. No nausea or vomiting. Genitourinary:    Denies any urgency, frequency, hematuria. Voiding  without difficulty. Extremities:   Denies lower extremity swelling, edema or cyanosis. Neurology:    Denies any headache or focal neurological deficits, Denies generalized weakness or memory difficulty. Psch:   Denies being anxious or depressed. Musculoskeletal:    Denies  myalgias, joint complaints or back pain. Integumentary:   Denies any rashes, ulcers, or excoriations. Denies bruising. Hematologic/Lymphatic:  Denies bruising or bleeding.     Physical Exam:  No intake/output data recorded. Intake/Output Summary (Last 24 hours) at 6/25/2022 0724  Last data filed at 6/24/2022 2200  Gross per 24 hour   Intake 660 ml   Output 1825 ml   Net -1165 ml   I/O last 3 completed shifts: In: 1020 [P.O.:1020]  Out: 3791 [Urine:1825]  No data found. Vital Signs:   Blood pressure (!) 141/79, pulse 94, temperature 97.8 °F (36.6 °C), temperature source Oral, resp. rate 18, SpO2 98 %, not currently breastfeeding. General appearance:  Alert, responsive, oriented to person, place, and time. Well preserved, alert, no distress. Head:  Normocephalic. No masses, lesions or tenderness. Eyes:  PERRLA. EOMI. Sclera clear. Buccal mucosa moist.  ENT:  Ears normal. Mucosa normal.  Neck:    Supple. Trachea midline. No thyromegaly. No JVD. No bruits. Heart:    Rhythm regular. Rate controlled. No murmurs. Lungs:    Symmetrical. Clear to auscultation bilaterally. No wheezes. No rhonchi. No rales. Abdomen:   Soft. Non-tender. Non-distended. Bowel sounds positive. No organomegaly or masses. No pain on palpation. Extremities:    Peripheral pulses present. No peripheral edema. No ulcers. No cyanosis. No clubbing. Neurologic:    Alert x 3. No focal deficit. Cranial nerves grossly intact. No focal weakness. Psych:   Overall psychiatric dysfunction is identified. Slow to respond but answers questions accordingly. Musculoskeletal:   Spine ROM normal. Muscular strength intact. Gait not assessed. Integumentary:  No rashes  Skin normal color and texture.   Genitalia/Breast:  Deferred    Medication:  Scheduled Meds:   sodium chloride flush  5-40 mL IntraVENous 2 times per day    levETIRAcetam  1,000 mg Oral BID    lithium  300 mg Oral TID WC    enoxaparin  30 mg SubCUTAneous BID     Continuous Infusions:   dextrose      sodium chloride         Objective Data:  CBC with Differential:    Lab Results   Component Value Date    WBC 9.8 06/25/2022    RBC 4.86 06/25/2022    HGB 12.3 06/25/2022    HCT 39.9 06/25/2022     06/25/2022    MCV 82.1 06/25/2022    MCH 25.3 06/25/2022    MCHC 30.8 06/25/2022    RDW 13.4 06/25/2022    METASPCT 0.9 06/25/2022    LYMPHOPCT 7.8 06/25/2022    MONOPCT 8.7 06/25/2022    BASOPCT 0.3 06/25/2022    MONOSABS 0.88 06/25/2022    LYMPHSABS 0.78 06/25/2022    EOSABS 0.00 06/25/2022    BASOSABS 0.00 06/25/2022     BMP:    Lab Results   Component Value Date     06/25/2022    K 3.9 06/25/2022    K 4.6 06/21/2022     06/25/2022    CO2 23 06/25/2022    BUN 8 06/25/2022    LABALBU 4.4 06/25/2022    CREATININE 0.9 06/25/2022    CALCIUM 9.7 06/25/2022    GFRAA >60 06/25/2022    LABGLOM >60 06/25/2022    GLUCOSE 119 06/25/2022       Assessment:  1. Breakthrough seizure with known seizure disorder and suspected outpatient noncompliance with Keppra therapy  2. Schizophrenia with extensive psychiatric history  3. Obstructive sleep apnea    Plan:   The patient was acceptable for discharge yesterday but the patient's mother now states she will pick her up this morning. We appreciate the input from the neurology and psychiatry teams along with the work from the discharge planning team.  Chronic comorbidities are otherwise well controlled and the patient is resting at her chronically debilitated baseline. She is acceptable for discharge. The events of the last night have been reviewed. More than 50% of my time was spent at the bedside counseling/coordinating care with the patient and/or family with face to face contact. This time was spent reviewing notes and laboratory data as well as instructing and counseling the patient. Time I spent with the family or surrogate(s) is included only if the patient was incapable of providing the necessary information or participating in medical decisions. I also discussed the differential diagnosis and all of the proposed management plans with the patient and individuals accompanying the patient.  I am readily available for any further decision-making and intervention.        Abraham Ramirez DO, F.A.C.O.I.  6/25/2022  7:24 AM

## 2022-06-25 NOTE — SIGNIFICANT EVENT
[address] [place], [place] [address]ist RRT/Code Blue Note    Subjective:    Called to bedside for complaint of patient being unresponsive. Per bedside RN, patient had been complaining of feeling dizzy earlier in the evening, and was given meclizine and had been sleeping. Sitter who was sitting with patient's roommate stated patient asked for some water, sat up like she was going to drink, then eyes \"glazed over. \"  Patient observed to be laying in bed, rapid shallow breathing when being observed, though would resume normal breathing pattern when did not think she was being observed. Stable BP, tachycardic in 120s. Patient did not initially respond to sternal rub, nailbed pressure. When arm was raised above head patient did not allow arm to fall down on her head. However, patient did intermittently respond to her name by sitting up, moaning, holding her ears, rocking back and forth, and crying \"mama. \"  Nurse states patient got in argument with her mother on the phone earlier, mother refused to come  patient and told her she had to stay at the hospital.  EKG obtained, sinus tachycardia. Morning labs to be drawn early.          sodium chloride flush  5-40 mL IntraVENous 2 times per day    levETIRAcetam  1,000 mg Oral BID    lithium  300 mg Oral TID WC    enoxaparin  30 mg SubCUTAneous BID     glucose, 4 tablet, PRN  dextrose bolus, 125 mL, PRN   Or  dextrose bolus, 250 mL, PRN  glucagon (rDNA), 1 mg, PRN  dextrose, 100 mL/hr, PRN  sodium chloride flush, 5-40 mL, PRN  sodium chloride, , PRN  ondansetron, 4 mg, Q8H PRN   Or  ondansetron, 4 mg, Q6H PRN  polyethylene glycol, 17 g, Daily PRN  acetaminophen, 650 mg, Q6H PRN   Or  acetaminophen, 650 mg, Q6H PRN         Objective:    BP (!) 127/90   Pulse (!) 108   Temp 99.1 °F (37.3 °C) (Oral)   Resp 20   SpO2 100%     Constitutional:  Obese, NAD  Eyes: no scleral icterus, normal lids, no discharge  ENMT:  Normocephalic, atraumatic, mucosa dry, EOMI  Neck:  trachea midline, no JVD  Lungs:  CTA bilaterally, no audible rhonchi or wheezes noted, respirations tachypneic and shallow but unlabored, no retractions  Heart[de-identified]  RRR, tachycardic, no murmur, rub, or gallop noted during exam  Abd:  Soft, non tender, non distended, bowel sounds present  :  deferred  MSK: sarcopenia absent  Ext:  Moving all extremities, no edema, pulses present  Skin:  Warm and dry, no rashes on visible skin  Psych: flat alternating with anxious affect  Neuro:  PERRL, not following commands, no response in right foot to babinski test, inconsistent response in left foot to babinski, positive drop arm test, fluctuating alertness    Recent Labs     06/22/22  1032 06/23/22  0519 06/24/22  0503    137 141   K 4.2 3.9 3.9    104 108*   CO2 26 24 24   BUN 9 8 9   CREATININE 0.9 0.8 0.9   GLUCOSE 83 74 93   CALCIUM 9.3 9.2 9.3       Recent Labs     06/22/22  1032 06/23/22  0519 06/24/22  0503   WBC 10.6 8.4 9.5   RBC 4.75 4.87 4.59   HGB 12.1 12.3 11.8   HCT 40.3 41.1 38.4   MCV 84.8 84.4 83.7   MCH 25.5* 25.3* 25.7*   MCHC 30.0* 29.9* 30.7*   RDW 13.4 13.5 13.6    373 363   MPV 9.6 9.8 9.8         I/O last 3 completed shifts: In: 1560 [P.O.:1560]  Out: 6474 [Urine:3425]  I/O this shift:  In: -   Out: 400 [Urine:400]      Assessment:    Principal Problem:    Altered mental status  Active Problems:    Seizures ([address]  Resolved Problems:    * No resolved hospital problems. *      Plan:  1. Schizophrenia, altered mental status after fight with mother, suspected psychiatric origin given positive drop arm test and inconsistent babinski exam.  Neurology and psychiatry already consulted. Patient was discharged yesterday but mother refused to pick her up per bedside nurse. EKG shows sinus tachycardia. Normal glucose. Defer further workup/treatment to primary team.      Critical care time 31 minutes not including procedures.     NOTE: This report was transcribed using voice recognition software. Every effort was made to ensure accuracy; however, inadvertent computerized transcription errors may be present.      Electronically signed by Fazal Natarajan DO on 6/25/2022 at 3:07 AM

## 2022-09-19 ENCOUNTER — HOSPITAL ENCOUNTER (EMERGENCY)
Age: 22
Discharge: HOME | End: 2022-09-19
Attending: EMERGENCY MEDICINE
Payer: COMMERCIAL

## 2022-09-19 ENCOUNTER — APPOINTMENT (OUTPATIENT)
Dept: ULTRASOUND IMAGING | Age: 22
End: 2022-09-19
Payer: COMMERCIAL

## 2022-09-19 VITALS
SYSTOLIC BLOOD PRESSURE: 121 MMHG | HEART RATE: 84 BPM | OXYGEN SATURATION: 99 % | DIASTOLIC BLOOD PRESSURE: 53 MMHG | HEIGHT: 65 IN | WEIGHT: 270 LBS | RESPIRATION RATE: 16 BRPM | TEMPERATURE: 98 F | BODY MASS INDEX: 44.98 KG/M2

## 2022-09-19 DIAGNOSIS — N93.9 VAGINAL BLEEDING: Primary | ICD-10-CM

## 2022-09-19 LAB
ANION GAP SERPL CALCULATED.3IONS-SCNC: 10 MMOL/L (ref 7–16)
BACTERIA: NORMAL /HPF
BASOPHILS ABSOLUTE: 0.06 E9/L (ref 0–0.2)
BASOPHILS RELATIVE PERCENT: 0.5 % (ref 0–2)
BILIRUBIN URINE: NEGATIVE
BLOOD, URINE: NEGATIVE
BUN BLDV-MCNC: 8 MG/DL (ref 6–20)
CALCIUM SERPL-MCNC: 9.3 MG/DL (ref 8.6–10.2)
CHLORIDE BLD-SCNC: 103 MMOL/L (ref 98–107)
CLARITY: CLEAR
CO2: 22 MMOL/L (ref 22–29)
COLOR: YELLOW
CREAT SERPL-MCNC: 1 MG/DL (ref 0.5–1)
EOSINOPHILS ABSOLUTE: 0.4 E9/L (ref 0.05–0.5)
EOSINOPHILS RELATIVE PERCENT: 3.5 % (ref 0–6)
EPITHELIAL CELLS, UA: NORMAL /HPF
GFR AFRICAN AMERICAN: >60
GFR NON-AFRICAN AMERICAN: >60 ML/MIN/1.73
GLUCOSE BLD-MCNC: 100 MG/DL (ref 74–99)
GLUCOSE URINE: NEGATIVE MG/DL
GONADOTROPIN, CHORIONIC (HCG) QUANT: 0.1 MIU/ML
HCG, URINE, POC: NEGATIVE
HCT VFR BLD CALC: 39.2 % (ref 34–48)
HEMOGLOBIN: 12 G/DL (ref 11.5–15.5)
IMMATURE GRANULOCYTES #: 0.12 E9/L
IMMATURE GRANULOCYTES %: 1 % (ref 0–5)
KETONES, URINE: NEGATIVE MG/DL
LEUKOCYTE ESTERASE, URINE: ABNORMAL
LYMPHOCYTES ABSOLUTE: 2.87 E9/L (ref 1.5–4)
LYMPHOCYTES RELATIVE PERCENT: 25 % (ref 20–42)
Lab: NORMAL
MCH RBC QN AUTO: 25.6 PG (ref 26–35)
MCHC RBC AUTO-ENTMCNC: 30.6 % (ref 32–34.5)
MCV RBC AUTO: 83.6 FL (ref 80–99.9)
MONOCYTES ABSOLUTE: 1.17 E9/L (ref 0.1–0.95)
MONOCYTES RELATIVE PERCENT: 10.2 % (ref 2–12)
NEGATIVE QC PASS/FAIL: NORMAL
NEUTROPHILS ABSOLUTE: 6.84 E9/L (ref 1.8–7.3)
NEUTROPHILS RELATIVE PERCENT: 59.8 % (ref 43–80)
NITRITE, URINE: NEGATIVE
PDW BLD-RTO: 15 FL (ref 11.5–15)
PH UA: 6 (ref 5–9)
PLATELET # BLD: 350 E9/L (ref 130–450)
PMV BLD AUTO: 9.2 FL (ref 7–12)
POSITIVE QC PASS/FAIL: NORMAL
POTASSIUM SERPL-SCNC: 4.2 MMOL/L (ref 3.5–5)
PROTEIN UA: NEGATIVE MG/DL
RBC # BLD: 4.69 E12/L (ref 3.5–5.5)
RBC UA: NORMAL /HPF (ref 0–2)
SODIUM BLD-SCNC: 135 MMOL/L (ref 132–146)
SPECIFIC GRAVITY UA: <=1.005 (ref 1–1.03)
UROBILINOGEN, URINE: 0.2 E.U./DL
WBC # BLD: 11.5 E9/L (ref 4.5–11.5)
WBC UA: NORMAL /HPF (ref 0–5)

## 2022-09-19 PROCEDURE — 87591 N.GONORRHOEAE DNA AMP PROB: CPT

## 2022-09-19 PROCEDURE — 87491 CHLMYD TRACH DNA AMP PROBE: CPT

## 2022-09-19 PROCEDURE — 80048 BASIC METABOLIC PNL TOTAL CA: CPT

## 2022-09-19 PROCEDURE — 76801 OB US < 14 WKS SINGLE FETUS: CPT

## 2022-09-19 PROCEDURE — 81001 URINALYSIS AUTO W/SCOPE: CPT

## 2022-09-19 PROCEDURE — 76817 TRANSVAGINAL US OBSTETRIC: CPT

## 2022-09-19 PROCEDURE — 99284 EMERGENCY DEPT VISIT MOD MDM: CPT

## 2022-09-19 PROCEDURE — 87088 URINE BACTERIA CULTURE: CPT

## 2022-09-19 PROCEDURE — 85025 COMPLETE CBC W/AUTO DIFF WBC: CPT

## 2022-09-19 PROCEDURE — 84702 CHORIONIC GONADOTROPIN TEST: CPT

## 2022-09-19 ASSESSMENT — PAIN - FUNCTIONAL ASSESSMENT: PAIN_FUNCTIONAL_ASSESSMENT: NONE - DENIES PAIN

## 2022-09-19 NOTE — Clinical Note
aMria Eugenia Chavez was seen and treated in our emergency department on 9/19/2022. She may return to work on 09/20/2022. If you have any questions or concerns, please don't hesitate to call.       Rachael Resendiz, DO

## 2022-09-19 NOTE — ED NOTES
Department of Emergency Medicine  FIRST PROVIDER TRIAGE NOTE             Independent MLP           9/19/22  5:52 PM EDT    Date of Encounter: 9/19/22   MRN: [id number]      HPI: Yeimi Corrales is a 25 y.o. female who presents to the ED for Pregnancy Test (Positive pregnancy test at the end of August.  Spotting, abd cramping), Abdominal Pain, and Nausea   Reportedly positive home preg test 8/30/22. Patient transferred to [facility] to [facility] on 9/16/22. Wants to be checked to see if she is preg. ROS: Negative for cp, sob, or abd pain. PE: Gen Appearance/Constitutional: alert  HEENT: NC/NT. PERRLA,  Airway patent. Initial Plan of Care: All treatment areas with department are currently occupied. Plan to order/Initiate the following while awaiting opening in ED: labs.   Initiate Treatment-Testing, Proceed toTreatment Area When Bed Available for ED Attending/MLP to Continue Care    Electronically signed by MELO Lloyd   DD: 9/19/22       MELO De Guzman  09/19/22 [id number]

## 2022-09-20 ENCOUNTER — HOSPITAL ENCOUNTER (EMERGENCY)
Age: 22
Discharge: HOME | End: 2022-09-20
Attending: EMERGENCY MEDICINE
Payer: COMMERCIAL

## 2022-09-20 ENCOUNTER — APPOINTMENT (OUTPATIENT)
Dept: GENERAL RADIOLOGY | Age: 22
End: 2022-09-20
Payer: COMMERCIAL

## 2022-09-20 VITALS
WEIGHT: 240 LBS | SYSTOLIC BLOOD PRESSURE: 112 MMHG | HEART RATE: 79 BPM | BODY MASS INDEX: 39.99 KG/M2 | TEMPERATURE: 98.2 F | DIASTOLIC BLOOD PRESSURE: 77 MMHG | RESPIRATION RATE: 18 BRPM | OXYGEN SATURATION: 100 % | HEIGHT: 65 IN

## 2022-09-20 DIAGNOSIS — R07.89 ATYPICAL CHEST PAIN: Primary | ICD-10-CM

## 2022-09-20 LAB
ALBUMIN SERPL-MCNC: 3.8 G/DL (ref 3.5–5.2)
ALP BLD-CCNC: 67 U/L (ref 35–104)
ALT SERPL-CCNC: 20 U/L (ref 0–32)
ANION GAP SERPL CALCULATED.3IONS-SCNC: 11 MMOL/L (ref 7–16)
AST SERPL-CCNC: 28 U/L (ref 0–31)
BACTERIA: ABNORMAL /HPF
BASOPHILS ABSOLUTE: 0.05 E9/L (ref 0–0.2)
BASOPHILS RELATIVE PERCENT: 0.5 % (ref 0–2)
BILIRUB SERPL-MCNC: 0.3 MG/DL (ref 0–1.2)
BILIRUBIN URINE: NEGATIVE
BLOOD, URINE: NEGATIVE
BUN BLDV-MCNC: 7 MG/DL (ref 6–20)
CALCIUM SERPL-MCNC: 9.3 MG/DL (ref 8.6–10.2)
CHLORIDE BLD-SCNC: 106 MMOL/L (ref 98–107)
CLARITY: CLEAR
CO2: 17 MMOL/L (ref 22–29)
COLOR: YELLOW
CREAT SERPL-MCNC: 0.9 MG/DL (ref 0.5–1)
EKG ATRIAL RATE: 58 BPM
EKG P AXIS: 40 DEGREES
EKG P-R INTERVAL: 146 MS
EKG Q-T INTERVAL: 398 MS
EKG QRS DURATION: 74 MS
EKG QTC CALCULATION (BAZETT): 390 MS
EKG R AXIS: 47 DEGREES
EKG T AXIS: -6 DEGREES
EKG VENTRICULAR RATE: 58 BPM
EOSINOPHILS ABSOLUTE: 0.37 E9/L (ref 0.05–0.5)
EOSINOPHILS RELATIVE PERCENT: 3.6 % (ref 0–6)
EPITHELIAL CELLS, UA: ABNORMAL /HPF
GFR AFRICAN AMERICAN: >60
GFR NON-AFRICAN AMERICAN: >60 ML/MIN/1.73
GLUCOSE BLD-MCNC: 95 MG/DL (ref 74–99)
GLUCOSE URINE: NEGATIVE MG/DL
HCT VFR BLD CALC: 39.8 % (ref 34–48)
HEMOGLOBIN: 11.9 G/DL (ref 11.5–15.5)
IMMATURE GRANULOCYTES #: 0.09 E9/L
IMMATURE GRANULOCYTES %: 0.9 % (ref 0–5)
KETONES, URINE: NEGATIVE MG/DL
LACTIC ACID: 1.2 MMOL/L (ref 0.5–2.2)
LEUKOCYTE ESTERASE, URINE: NEGATIVE
LIPASE: 24 U/L (ref 13–60)
LYMPHOCYTES ABSOLUTE: 2.55 E9/L (ref 1.5–4)
LYMPHOCYTES RELATIVE PERCENT: 24.8 % (ref 20–42)
MCH RBC QN AUTO: 25.3 PG (ref 26–35)
MCHC RBC AUTO-ENTMCNC: 29.9 % (ref 32–34.5)
MCV RBC AUTO: 84.7 FL (ref 80–99.9)
MONOCYTES ABSOLUTE: 1.24 E9/L (ref 0.1–0.95)
MONOCYTES RELATIVE PERCENT: 12.1 % (ref 2–12)
NEUTROPHILS ABSOLUTE: 5.98 E9/L (ref 1.8–7.3)
NEUTROPHILS RELATIVE PERCENT: 58.1 % (ref 43–80)
NITRITE, URINE: NEGATIVE
PDW BLD-RTO: 15.2 FL (ref 11.5–15)
PH UA: 6.5 (ref 5–9)
PLATELET # BLD: 328 E9/L (ref 130–450)
PMV BLD AUTO: 9.3 FL (ref 7–12)
POTASSIUM REFLEX MAGNESIUM: 5 MMOL/L (ref 3.5–5)
PRO-BNP: 42 PG/ML (ref 0–125)
PROTEIN UA: NEGATIVE MG/DL
RBC # BLD: 4.7 E12/L (ref 3.5–5.5)
RBC UA: ABNORMAL /HPF (ref 0–2)
REASON FOR REJECTION: NORMAL
REJECTED TEST: NORMAL
SODIUM BLD-SCNC: 134 MMOL/L (ref 132–146)
SPECIFIC GRAVITY UA: <=1.005 (ref 1–1.03)
TOTAL PROTEIN: 7.6 G/DL (ref 6.4–8.3)
TROPONIN, HIGH SENSITIVITY: 7 NG/L (ref 0–9)
UROBILINOGEN, URINE: 0.2 E.U./DL
WBC # BLD: 10.3 E9/L (ref 4.5–11.5)
WBC UA: ABNORMAL /HPF (ref 0–5)

## 2022-09-20 PROCEDURE — 6360000002 HC RX W HCPCS

## 2022-09-20 PROCEDURE — 36415 COLL VENOUS BLD VENIPUNCTURE: CPT

## 2022-09-20 PROCEDURE — 81001 URINALYSIS AUTO W/SCOPE: CPT

## 2022-09-20 PROCEDURE — 80053 COMPREHEN METABOLIC PANEL: CPT

## 2022-09-20 PROCEDURE — 71046 X-RAY EXAM CHEST 2 VIEWS: CPT

## 2022-09-20 PROCEDURE — 2580000003 HC RX 258

## 2022-09-20 PROCEDURE — 96374 THER/PROPH/DIAG INJ IV PUSH: CPT

## 2022-09-20 PROCEDURE — 83690 ASSAY OF LIPASE: CPT

## 2022-09-20 PROCEDURE — 83880 ASSAY OF NATRIURETIC PEPTIDE: CPT

## 2022-09-20 PROCEDURE — 93005 ELECTROCARDIOGRAM TRACING: CPT

## 2022-09-20 PROCEDURE — 85025 COMPLETE CBC W/AUTO DIFF WBC: CPT

## 2022-09-20 PROCEDURE — 96375 TX/PRO/DX INJ NEW DRUG ADDON: CPT

## 2022-09-20 PROCEDURE — 99285 EMERGENCY DEPT VISIT HI MDM: CPT

## 2022-09-20 PROCEDURE — 83605 ASSAY OF LACTIC ACID: CPT

## 2022-09-20 PROCEDURE — 84484 ASSAY OF TROPONIN QUANT: CPT

## 2022-09-20 RX ORDER — 0.9 % SODIUM CHLORIDE 0.9 %
1000 INTRAVENOUS SOLUTION INTRAVENOUS ONCE
Status: COMPLETED | OUTPATIENT
Start: 2022-09-20 | End: 2022-09-20

## 2022-09-20 RX ORDER — KETOROLAC TROMETHAMINE 30 MG/ML
15 INJECTION, SOLUTION INTRAMUSCULAR; INTRAVENOUS ONCE
Status: COMPLETED | OUTPATIENT
Start: 2022-09-20 | End: 2022-09-20

## 2022-09-20 RX ORDER — ONDANSETRON 2 MG/ML
4 INJECTION INTRAMUSCULAR; INTRAVENOUS ONCE
Status: COMPLETED | OUTPATIENT
Start: 2022-09-20 | End: 2022-09-20

## 2022-09-20 RX ADMIN — KETOROLAC TROMETHAMINE 15 MG: 30 INJECTION, SOLUTION INTRAMUSCULAR at 14:52

## 2022-09-20 RX ADMIN — SODIUM CHLORIDE 1000 ML: 9 INJECTION, SOLUTION INTRAVENOUS at 12:59

## 2022-09-20 RX ADMIN — ONDANSETRON 4 MG: 2 INJECTION INTRAMUSCULAR; INTRAVENOUS at 15:13

## 2022-09-20 ASSESSMENT — PAIN DESCRIPTION - ORIENTATION: ORIENTATION: MID;UPPER

## 2022-09-20 ASSESSMENT — ENCOUNTER SYMPTOMS
COUGH: 0
ABDOMINAL PAIN: 1
SHORTNESS OF BREATH: 0
BACK PAIN: 0

## 2022-09-20 ASSESSMENT — PAIN DESCRIPTION - LOCATION
LOCATION: ABDOMEN
LOCATION: CHEST

## 2022-09-20 ASSESSMENT — PAIN - FUNCTIONAL ASSESSMENT: PAIN_FUNCTIONAL_ASSESSMENT: 0-10

## 2022-09-20 ASSESSMENT — PAIN DESCRIPTION - PAIN TYPE: TYPE: ACUTE PAIN

## 2022-09-20 ASSESSMENT — PAIN SCALES - GENERAL
PAINLEVEL_OUTOF10: 10
PAINLEVEL_OUTOF10: 10

## 2022-09-20 ASSESSMENT — PAIN DESCRIPTION - DESCRIPTORS: DESCRIPTORS: ACHING

## 2022-09-20 ASSESSMENT — PAIN DESCRIPTION - FREQUENCY: FREQUENCY: CONTINUOUS

## 2022-09-20 NOTE — ED NOTES
Patient arrives via ambulance from [facility] for 2 weeks of coughing, chest pain with deep inspiration, hot flashes and some nausea. MD at bedside. JEREMY Zamora RN  09/20/22 [id number]

## 2022-09-20 NOTE — ED PROVIDER NOTES
Breath sounds: No wheezing, rhonchi or rales. Chest:      Chest wall: No tenderness. Abdominal:      General: There is no distension. Palpations: Abdomen is soft. Tenderness: There is no abdominal tenderness. There is no guarding or rebound. Hernia: No hernia is present. Musculoskeletal:      Cervical back: Normal range of motion and neck supple. Right lower leg: No edema. Left lower leg: No edema. Skin:     General: Skin is warm and dry. Capillary Refill: Capillary refill takes less than 2 seconds. Neurological:      General: No focal deficit present. Mental Status: She is alert and oriented to person, place, and time. Cranial Nerves: No cranial nerve deficit. Psychiatric:         Mood and Affect: Mood normal.         Behavior: Behavior normal.        Procedures     MDM  Number of Diagnoses or Management Options  Vaginal bleeding  Diagnosis management comments: This is a 25 female who presented caregiver for evaluation of vaginal spotting and there was a questionable positive pregnancy test in August. Patient's abdomen was soft and nonteder. Patient had multiple negative pregnancy tests. US was reasuring. Hemoglobin was stable. I did offerer pelvic exam as well as treated for STDS however patient stated she wanted neither of such and understood such risks of missing so. I did send urine GC/CH and they wished to be called back with results if positive. I did recommend following up with OB/GYN and they were given return precautions                     --------------------------------------------- PAST HISTORY ---------------------------------------------  Past Medical History:  has a past medical history of ADHD, Autism disorder, Colitis, Schizophrenia ([facility] [address] and Seizures ([facility] [address] Past Surgical History:  has a past surgical history that includes Breast surgery (Right) and laparoscopy (Left, 8/9/2019). Social History:  reports that she has never smoked.  She has mg/dL   Urinalysis   Result Value Ref Range    Color, UA Yellow Straw/Yellow    Clarity, UA Clear Clear    Glucose, Ur Negative Negative mg/dL    Bilirubin Urine Negative Negative    Ketones, Urine Negative Negative mg/dL    Specific Gravity, UA <=1.005 1.005 - 1.030    Blood, Urine Negative Negative    pH, UA 6.0 5.0 - 9.0    Protein, UA Negative Negative mg/dL    Urobilinogen, Urine 0.2 <2.0 E.U./dL    Nitrite, Urine Negative Negative    Leukocyte Esterase, Urine TRACE (A) Negative   Microscopic Urinalysis   Result Value Ref Range    WBC, UA 1-3 0 - 5 /HPF    RBC, UA 1-3 0 - 2 /HPF    Epithelial Cells, UA RARE /HPF    Bacteria, UA NONE SEEN None Seen /HPF   POC Pregnancy Urine Qual   Result Value Ref Range    HCG, Urine, POC Negative Negative    Lot Number [id number]     Positive QC Pass/Fail Pass     Negative QC Pass/Fail Pass        Radiology:  US OB LESS THAN 14 WEEKS SINGLE OR FIRST GESTATION   Final Result   1. Pregnancy of unknown location. An intrauterine gestational sac is not   identified on this exam.  Endometrium measured 13 mm. 2.  Normal appearance of the bilateral ovaries. No adnexal masses. Normal   ovarian vascularity. 3.  Simple appearing free fluid is identified in the pelvis. Recommendation: Suggest trending quantitative beta HCG levels with repeat   pelvic ultrasound in 4-6 weeks if there is appropriate rise in quantitative   beta hCG levels. Imaging should occur sooner for worsening symptoms. US OB TRANSVAGINAL   Final Result   1. Pregnancy of unknown location. An intrauterine gestational sac is not   identified on this exam.  Endometrium measured 13 mm. 2.  Normal appearance of the bilateral ovaries. No adnexal masses. Normal   ovarian vascularity. 3.  Simple appearing free fluid is identified in the pelvis.       Recommendation: Suggest trending quantitative beta HCG levels with repeat   pelvic ultrasound in 4-6 weeks if there is appropriate rise in quantitative   beta hCG levels. Imaging should occur sooner for worsening symptoms. ------------------------- NURSING NOTES AND VITALS REVIEWED ---------------------------  Date / Time Roomed:  9/19/2022  8:57 PM  ED Bed Assignment:  13/13    The nursing notes within the ED encounter and vital signs as below have been reviewed. BP (!) 121/53   Pulse 84   Temp 98 °F (36.7 °C) (Temporal)   Resp 16   Ht 5' 5\" (1.651 m)   Wt 270 lb (122.5 kg)   LMP 04/05/2022   SpO2 99%   BMI 44.93 kg/m²   Oxygen Saturation Interpretation: Normal      ------------------------------------------ PROGRESS NOTES ------------------------------------------  9:45 PM EDT  I have spoken with the patient and discussed todays results, in addition to providing specific details for the plan of care and counseling regarding the diagnosis and prognosis. Their questions are answered at this time and they are agreeable with the plan. I discussed at length with them reasons for immediate return here for re evaluation. They will followup with their PCP      --------------------------------- ADDITIONAL PROVIDER NOTES ---------------------------------  At this time the patient is without objective evidence of an acute process requiring hospitalization or inpatient management. They have remained hemodynamically stable throughout their entire ED visit and are stable for discharge with outpatient follow-up. The plan has been discussed in detail and they are aware of the specific conditions for emergent return, as well as the importance of follow-up. Discharge Medication List as of 9/19/2022 11:27 PM          Diagnosis:  1. Vaginal bleeding        Disposition:  Patient's disposition: Discharge to home  Patient's condition is stable.       Adrián Hurtado DO  09/20/22 6279

## 2022-09-22 LAB
C. TRACHOMATIS DNA ,URINE: NEGATIVE
N. GONORRHOEAE DNA, URINE: NEGATIVE
SOURCE: NORMAL
URINE CULTURE, ROUTINE: NORMAL

## 2022-09-26 ENCOUNTER — HOSPITAL ENCOUNTER (INPATIENT)
Age: 22
LOS: 5 days | Discharge: HOME | DRG: 753 | End: 2022-10-01
Attending: STUDENT IN AN ORGANIZED HEALTH CARE EDUCATION/TRAINING PROGRAM | Admitting: PSYCHIATRY & NEUROLOGY
Payer: COMMERCIAL

## 2022-09-26 DIAGNOSIS — R45.850 HOMICIDAL IDEATION: ICD-10-CM

## 2022-09-26 DIAGNOSIS — R45.851 SUICIDAL IDEATION: Primary | ICD-10-CM

## 2022-09-26 DIAGNOSIS — Z86.59 HISTORY OF SCHIZOPHRENIA: ICD-10-CM

## 2022-09-26 DIAGNOSIS — R44.0 AUDITORY HALLUCINATION: ICD-10-CM

## 2022-09-26 DIAGNOSIS — T14.91XA SUICIDE ATTEMPT (HCC): ICD-10-CM

## 2022-09-26 LAB
ACETAMINOPHEN LEVEL: <5 MCG/ML (ref 10–30)
ALBUMIN SERPL-MCNC: 4.7 G/DL (ref 3.5–5.2)
ALP BLD-CCNC: 82 U/L (ref 35–104)
ALT SERPL-CCNC: 26 U/L (ref 0–32)
AMPHETAMINE SCREEN, URINE: NOT DETECTED
ANION GAP SERPL CALCULATED.3IONS-SCNC: 12 MMOL/L (ref 7–16)
AST SERPL-CCNC: 22 U/L (ref 0–31)
BACTERIA: ABNORMAL /HPF
BARBITURATE SCREEN URINE: NOT DETECTED
BASOPHILS ABSOLUTE: 0.04 E9/L (ref 0–0.2)
BASOPHILS RELATIVE PERCENT: 0.3 % (ref 0–2)
BENZODIAZEPINE SCREEN, URINE: NOT DETECTED
BILIRUB SERPL-MCNC: 0.2 MG/DL (ref 0–1.2)
BILIRUBIN URINE: NEGATIVE
BLOOD, URINE: ABNORMAL
BUN BLDV-MCNC: 14 MG/DL (ref 6–20)
CALCIUM SERPL-MCNC: 10.5 MG/DL (ref 8.6–10.2)
CANNABINOID SCREEN URINE: NOT DETECTED
CHLORIDE BLD-SCNC: 111 MMOL/L (ref 98–107)
CLARITY: CLEAR
CO2: 20 MMOL/L (ref 22–29)
COCAINE METABOLITE SCREEN URINE: NOT DETECTED
COLOR: YELLOW
CREAT SERPL-MCNC: 1.3 MG/DL (ref 0.5–1)
EOSINOPHILS ABSOLUTE: 0.53 E9/L (ref 0.05–0.5)
EOSINOPHILS RELATIVE PERCENT: 4.6 % (ref 0–6)
EPITHELIAL CELLS, UA: ABNORMAL /HPF
ETHANOL: <10 MG/DL (ref 0–0.08)
FENTANYL SCREEN, URINE: NOT DETECTED
GFR AFRICAN AMERICAN: >60
GFR NON-AFRICAN AMERICAN: >60 ML/MIN/1.73
GLUCOSE BLD-MCNC: 100 MG/DL (ref 74–99)
GLUCOSE URINE: NEGATIVE MG/DL
HCG, URINE, POC: NEGATIVE
HCT VFR BLD CALC: 40.5 % (ref 34–48)
HEMOGLOBIN: 12.6 G/DL (ref 11.5–15.5)
IMMATURE GRANULOCYTES #: 0.08 E9/L
IMMATURE GRANULOCYTES %: 0.7 % (ref 0–5)
INFLUENZA A: NOT DETECTED
INFLUENZA B: NOT DETECTED
KETONES, URINE: NEGATIVE MG/DL
LEUKOCYTE ESTERASE, URINE: NEGATIVE
LYMPHOCYTES ABSOLUTE: 2.36 E9/L (ref 1.5–4)
LYMPHOCYTES RELATIVE PERCENT: 20.6 % (ref 20–42)
Lab: NORMAL
Lab: NORMAL
MCH RBC QN AUTO: 26.3 PG (ref 26–35)
MCHC RBC AUTO-ENTMCNC: 31.1 % (ref 32–34.5)
MCV RBC AUTO: 84.4 FL (ref 80–99.9)
METHADONE SCREEN, URINE: NOT DETECTED
MONOCYTES ABSOLUTE: 1.07 E9/L (ref 0.1–0.95)
MONOCYTES RELATIVE PERCENT: 9.3 % (ref 2–12)
NEGATIVE QC PASS/FAIL: NORMAL
NEUTROPHILS ABSOLUTE: 7.38 E9/L (ref 1.8–7.3)
NEUTROPHILS RELATIVE PERCENT: 64.5 % (ref 43–80)
NITRITE, URINE: NEGATIVE
OPIATE SCREEN URINE: NOT DETECTED
OXYCODONE URINE: NOT DETECTED
PDW BLD-RTO: 14.8 FL (ref 11.5–15)
PH UA: 6 (ref 5–9)
PHENCYCLIDINE SCREEN URINE: NOT DETECTED
PLATELET # BLD: 354 E9/L (ref 130–450)
PMV BLD AUTO: 9.4 FL (ref 7–12)
POSITIVE QC PASS/FAIL: NORMAL
POTASSIUM REFLEX MAGNESIUM: 4.6 MMOL/L (ref 3.5–5)
PROTEIN UA: NEGATIVE MG/DL
RBC # BLD: 4.8 E12/L (ref 3.5–5.5)
RBC UA: ABNORMAL /HPF (ref 0–2)
SALICYLATE, SERUM: <0.3 MG/DL (ref 0–30)
SARS-COV-2 RNA, RT PCR: NOT DETECTED
SODIUM BLD-SCNC: 143 MMOL/L (ref 132–146)
SPECIFIC GRAVITY UA: <=1.005 (ref 1–1.03)
TOTAL PROTEIN: 9 G/DL (ref 6.4–8.3)
TRICYCLIC ANTIDEPRESSANTS SCREEN SERUM: NEGATIVE NG/ML
UROBILINOGEN, URINE: 0.2 E.U./DL
WBC # BLD: 11.5 E9/L (ref 4.5–11.5)
WBC UA: ABNORMAL /HPF (ref 0–5)

## 2022-09-26 PROCEDURE — 81001 URINALYSIS AUTO W/SCOPE: CPT

## 2022-09-26 PROCEDURE — 82077 ASSAY SPEC XCP UR&BREATH IA: CPT

## 2022-09-26 PROCEDURE — 80143 DRUG ASSAY ACETAMINOPHEN: CPT

## 2022-09-26 PROCEDURE — 1240000000 HC EMOTIONAL WELLNESS R&B

## 2022-09-26 PROCEDURE — 93005 ELECTROCARDIOGRAM TRACING: CPT | Performed by: STUDENT IN AN ORGANIZED HEALTH CARE EDUCATION/TRAINING PROGRAM

## 2022-09-26 PROCEDURE — 87636 SARSCOV2 & INF A&B AMP PRB: CPT

## 2022-09-26 PROCEDURE — 99285 EMERGENCY DEPT VISIT HI MDM: CPT

## 2022-09-26 PROCEDURE — 6370000000 HC RX 637 (ALT 250 FOR IP): Performed by: STUDENT IN AN ORGANIZED HEALTH CARE EDUCATION/TRAINING PROGRAM

## 2022-09-26 PROCEDURE — 80053 COMPREHEN METABOLIC PANEL: CPT

## 2022-09-26 PROCEDURE — 80179 DRUG ASSAY SALICYLATE: CPT

## 2022-09-26 PROCEDURE — 80307 DRUG TEST PRSMV CHEM ANLYZR: CPT

## 2022-09-26 PROCEDURE — 2580000003 HC RX 258: Performed by: STUDENT IN AN ORGANIZED HEALTH CARE EDUCATION/TRAINING PROGRAM

## 2022-09-26 PROCEDURE — 85025 COMPLETE CBC W/AUTO DIFF WBC: CPT

## 2022-09-26 PROCEDURE — 36415 COLL VENOUS BLD VENIPUNCTURE: CPT

## 2022-09-26 RX ORDER — ARIPIPRAZOLE 300 MG
300 KIT INTRAMUSCULAR
Status: ON HOLD | COMMUNITY
End: 2022-09-27

## 2022-09-26 RX ORDER — LEVETIRACETAM 500 MG/1
1000 TABLET ORAL ONCE
Status: COMPLETED | OUTPATIENT
Start: 2022-09-26 | End: 2022-09-26

## 2022-09-26 RX ORDER — QUETIAPINE FUMARATE 100 MG/1
100 TABLET, FILM COATED ORAL DAILY
Status: ON HOLD | COMMUNITY
End: 2022-10-01 | Stop reason: HOSPADM

## 2022-09-26 RX ORDER — LITHIUM CARBONATE 300 MG/1
300 CAPSULE ORAL ONCE
Status: COMPLETED | OUTPATIENT
Start: 2022-09-26 | End: 2022-09-26

## 2022-09-26 RX ORDER — HALOPERIDOL 5 MG/ML
5 INJECTION INTRAMUSCULAR EVERY 6 HOURS PRN
Status: DISCONTINUED | OUTPATIENT
Start: 2022-09-26 | End: 2022-10-01 | Stop reason: HOSPADM

## 2022-09-26 RX ORDER — AMLODIPINE BESYLATE 5 MG/1
5 TABLET ORAL DAILY
Status: ON HOLD | COMMUNITY
End: 2022-10-01 | Stop reason: HOSPADM

## 2022-09-26 RX ORDER — 0.9 % SODIUM CHLORIDE 0.9 %
1000 INTRAVENOUS SOLUTION INTRAVENOUS ONCE
Status: COMPLETED | OUTPATIENT
Start: 2022-09-26 | End: 2022-09-26

## 2022-09-26 RX ORDER — ACETAMINOPHEN 325 MG/1
650 TABLET ORAL EVERY 6 HOURS PRN
Status: DISCONTINUED | OUTPATIENT
Start: 2022-09-26 | End: 2022-10-01 | Stop reason: HOSPADM

## 2022-09-26 RX ORDER — LANOLIN ALCOHOL/MO/W.PET/CERES
3 CREAM (GRAM) TOPICAL NIGHTLY
Status: DISCONTINUED | OUTPATIENT
Start: 2022-09-27 | End: 2022-10-01 | Stop reason: HOSPADM

## 2022-09-26 RX ORDER — LORAZEPAM 1 MG/1
1 TABLET ORAL ONCE
Status: COMPLETED | OUTPATIENT
Start: 2022-09-26 | End: 2022-09-26

## 2022-09-26 RX ORDER — QUETIAPINE FUMARATE 100 MG/1
200 TABLET, FILM COATED ORAL ONCE
Status: COMPLETED | OUTPATIENT
Start: 2022-09-26 | End: 2022-09-26

## 2022-09-26 RX ORDER — QUETIAPINE FUMARATE 200 MG/1
200 TABLET, FILM COATED ORAL NIGHTLY
Status: ON HOLD | COMMUNITY
End: 2022-10-01 | Stop reason: HOSPADM

## 2022-09-26 RX ORDER — PROPRANOLOL HYDROCHLORIDE 10 MG/1
10 TABLET ORAL 3 TIMES DAILY
Status: ON HOLD | COMMUNITY
End: 2022-10-01 | Stop reason: HOSPADM

## 2022-09-26 RX ORDER — NICOTINE 21 MG/24HR
1 PATCH, TRANSDERMAL 24 HOURS TRANSDERMAL DAILY
Status: DISCONTINUED | OUTPATIENT
Start: 2022-09-27 | End: 2022-10-01 | Stop reason: HOSPADM

## 2022-09-26 RX ORDER — HYDROXYZINE PAMOATE 50 MG/1
50 CAPSULE ORAL 3 TIMES DAILY PRN
Status: DISCONTINUED | OUTPATIENT
Start: 2022-09-26 | End: 2022-10-01 | Stop reason: HOSPADM

## 2022-09-26 RX ORDER — PROPRANOLOL HYDROCHLORIDE 10 MG/1
10 TABLET ORAL ONCE
Status: COMPLETED | OUTPATIENT
Start: 2022-09-26 | End: 2022-09-26

## 2022-09-26 RX ORDER — HALOPERIDOL 5 MG
5 TABLET ORAL EVERY 6 HOURS PRN
Status: DISCONTINUED | OUTPATIENT
Start: 2022-09-26 | End: 2022-10-01 | Stop reason: HOSPADM

## 2022-09-26 RX ORDER — MAGNESIUM HYDROXIDE/ALUMINUM HYDROXICE/SIMETHICONE 120; 1200; 1200 MG/30ML; MG/30ML; MG/30ML
30 SUSPENSION ORAL PRN
Status: DISCONTINUED | OUTPATIENT
Start: 2022-09-26 | End: 2022-10-01 | Stop reason: HOSPADM

## 2022-09-26 RX ORDER — LORAZEPAM 0.5 MG/1
0.5 TABLET ORAL 2 TIMES DAILY
Status: ON HOLD | COMMUNITY
End: 2022-10-01 | Stop reason: HOSPADM

## 2022-09-26 RX ADMIN — QUETIAPINE FUMARATE 200 MG: 100 TABLET ORAL at 23:09

## 2022-09-26 RX ADMIN — LORAZEPAM 1 MG: 1 TABLET ORAL at 21:24

## 2022-09-26 RX ADMIN — SODIUM CHLORIDE 1000 ML: 9 INJECTION, SOLUTION INTRAVENOUS at 19:12

## 2022-09-26 RX ADMIN — LITHIUM CARBONATE 300 MG: 300 CAPSULE ORAL at 23:10

## 2022-09-26 RX ADMIN — PROPRANOLOL HYDROCHLORIDE 10 MG: 10 TABLET ORAL at 23:10

## 2022-09-26 RX ADMIN — LEVETIRACETAM 1000 MG: 500 TABLET, FILM COATED ORAL at 23:09

## 2022-09-26 ASSESSMENT — ENCOUNTER SYMPTOMS
SHORTNESS OF BREATH: 0
VOMITING: 0
NAUSEA: 0
RHINORRHEA: 0
ABDOMINAL PAIN: 0
COUGH: 0

## 2022-09-26 ASSESSMENT — PAIN - FUNCTIONAL ASSESSMENT: PAIN_FUNCTIONAL_ASSESSMENT: NONE - DENIES PAIN

## 2022-09-26 NOTE — ED NOTES
CO form filled out and faxed for CO. Belongings Placed in locker 27.       Keyla Garcia RN  09/26/22 2012

## 2022-09-26 NOTE — ED PROVIDER NOTES
Patient is a 78-year-old female with a history of suicidal ideation, suicidal attempt, schizophrenia who presents to the emergency department from [facility] for suicidal ideation, suicide attempt. She presents with her . Home worker stating that patient took off her shoelaces in an attempt to strangle herself. Patient also tried choking herself with her own hands. Patient also endorses HI with the demon voices telling her to kill doctors, nurses, . Patient Dors is plan to enact HI with punches. Patient states she has had suicide attempt in the past.  Patient has been taking her medication as prescribed per . Patient denies any other self-harm, has not no access to other medications for overdose. Patient does not have access to alcohol. Patient states this has been going on for some time. Patient is very agitated, ROS is somewhat limited. Review of Systems   Constitutional:  Negative for chills and fever. HENT:  Negative for congestion and rhinorrhea. Eyes:  Negative for visual disturbance. Respiratory:  Negative for cough and shortness of breath. Cardiovascular:  Negative for chest pain. Gastrointestinal:  Negative for abdominal pain, nausea and vomiting. Endocrine: Negative for polyuria. Genitourinary:  Negative for dysuria and hematuria. Musculoskeletal:  Negative for arthralgias and myalgias. Skin:  Negative for rash and wound. Allergic/Immunologic: Negative for immunocompromised state. Neurological:  Negative for dizziness and weakness. Psychiatric/Behavioral:  Positive for agitation, hallucinations, self-injury and suicidal ideas. Negative for confusion. The patient is nervous/anxious. Physical Exam  Vitals and nursing note reviewed. Constitutional:       General: She is awake. She is not in acute distress. Appearance: She is obese. She is not ill-appearing. HENT:      Head: Normocephalic and atraumatic. Mouth/Throat:      Mouth: Mucous membranes are moist.   Eyes:      Pupils: Pupils are equal, round, and reactive to light. Cardiovascular:      Rate and Rhythm: Normal rate and regular rhythm. Pulses: Normal pulses. Heart sounds: Normal heart sounds. Pulmonary:      Effort: Pulmonary effort is normal.      Breath sounds: Normal breath sounds. Abdominal:      Palpations: Abdomen is soft. Tenderness: There is no abdominal tenderness. Musculoskeletal:         General: Normal range of motion. Cervical back: Normal range of motion. Skin:     General: Skin is warm and dry. Capillary Refill: Capillary refill takes less than 2 seconds. Neurological:      General: No focal deficit present. Mental Status: She is alert and oriented to person, place, and time. Psychiatric:         Attention and Perception: She perceives auditory hallucinations. She does not perceive visual hallucinations. Mood and Affect: Mood is anxious. Speech: Speech is rapid and pressured. Behavior: Behavior is cooperative. Thought Content: Thought content includes homicidal and suicidal ideation. Thought content includes homicidal and suicidal plan. MDM  Number of Diagnoses or Management Options  Diagnosis management comments: Patient is a 50-year-old female with a history of schizophrenia presents to the emergency department with homicidal and suicidal ideation, suicide attempt. Patient presents awake, alert, agitated, redirectable. Patient with flight of ideas, pressured speech. Patient endorses SI and HI, pink slip was completed for patient and staff safety. Vital signs were noted. Work-up is on remarkable except for mildly elevated creatinine. Patient was given IV fluids. EKG was reviewed. Patient is able to be medically cleared, and will be seen by social work for disposition recommendation.   Patient was reevaluated multiple times in the emergency department, calm, able to be redirected for slight agitation. Patient will be monitored pending disposition recommendation. Amount and/or Complexity of Data Reviewed  Clinical lab tests: ordered and reviewed       EKG: This EKG is signed and interpreted by me. Rate: 90  Rhythm: Sinus  Interpretation: sinus rhythm, normal WV interval, normal QRS, normal QT interval, no acute ST or T wave changes  Comparison: stable as compared to patient's most recent EKG     --------------------------------------------- PAST HISTORY ---------------------------------------------  Past Medical History:  has a past medical history of ADHD, Autism disorder, Colitis, Schizophrenia ([facility] [address] and Seizures ([facility] [address] Past Surgical History:  has a past surgical history that includes Breast surgery (Right) and laparoscopy (Left, 8/9/2019). Social History:  reports that she has never smoked. She has never used smokeless tobacco. She reports that she does not drink alcohol and does not use drugs. Family History: family history includes Diabetes in her mother; Other in her mother. The patients home medications have been reviewed.     Allergies: Dye [barium-containing compounds], Sulfa antibiotics, and Versed [midazolam]    -------------------------------------------------- RESULTS -------------------------------------------------    LABS:  Results for orders placed or performed during the hospital encounter of 09/26/22   COVID-19 & Influenza Combo    Specimen: Nasopharyngeal Swab   Result Value Ref Range    SARS-CoV-2 RNA, RT PCR NOT DETECTED NOT DETECTED    INFLUENZA A NOT DETECTED NOT DETECTED    INFLUENZA B NOT DETECTED NOT DETECTED   CBC with Auto Differential   Result Value Ref Range    WBC 11.5 4.5 - 11.5 E9/L    RBC 4.80 3.50 - 5.50 E12/L    Hemoglobin 12.6 11.5 - 15.5 g/dL    Hematocrit 40.5 34.0 - 48.0 %    MCV 84.4 80.0 - 99.9 fL    MCH 26.3 26.0 - 35.0 pg    MCHC 31.1 (L) 32.0 - 34.5 %    RDW 14.8 11.5 - 15.0 fL    Platelets 819 <0.3 0.0 - 30.0 mg/dL    TCA Scrn NEGATIVE Cutoff:300 ng/mL   Urine Drug Screen   Result Value Ref Range    Amphetamine Screen, Urine NOT DETECTED Negative <1000 ng/mL    Barbiturate Screen, Ur NOT DETECTED Negative < 200 ng/mL    Benzodiazepine Screen, Urine NOT DETECTED Negative < 200 ng/mL    Cannabinoid Scrn, Ur NOT DETECTED Negative < 50ng/mL    Cocaine Metabolite Screen, Urine NOT DETECTED Negative < 300 ng/mL    Opiate Scrn, Ur NOT DETECTED Negative < 300ng/mL    PCP Screen, Urine NOT DETECTED Negative < 25 ng/mL    Methadone Screen, Urine NOT DETECTED Negative <300 ng/mL    Oxycodone Urine NOT DETECTED Negative <100 ng/mL    FENTANYL SCREEN, URINE NOT DETECTED Negative <1 ng/mL    Drug Screen Comment: see below    Lithium Level   Result Value Ref Range    Lithium Dose Amount Unknown    POC Pregnancy Urine   Result Value Ref Range    HCG, Urine, POC Negative Negative    Lot Number [id number]     Positive QC Pass/Fail Pass     Negative QC Pass/Fail Pass    EKG 12 Lead   Result Value Ref Range    Ventricular Rate 90 BPM    Atrial Rate 90 BPM    P-R Interval 146 ms    QRS Duration 72 ms    Q-T Interval 328 ms    QTc Calculation (Bazett) 401 ms    P Axis 40 degrees    R Axis 25 degrees    T Axis 0 degrees       RADIOLOGY:  No orders to display     ------------------------- NURSING NOTES AND VITALS REVIEWED ---------------------------  Date / Time Roomed:  9/26/2022  2:41 PM  ED Bed Assignment:  [facility]    The nursing notes within the ED encounter and vital signs as below have been reviewed.      Patient Vitals for the past 24 hrs:   BP Temp Temp src Pulse Resp SpO2   09/26/22 2040 126/88 97 °F (36.1 °C) -- 99 16 100 %   09/26/22 1420 125/83 98.2 °F (36.8 °C) Oral (!) 102 16 100 %       Oxygen Saturation Interpretation: Normal    ------------------------------------------ PROGRESS NOTES ------------------------------------------  Re-evaluation(s):  Patients symptoms show no change  Repeat physical examination is not changed    Counseling:  I have spoken with the patient and discussed todays results, in addition to providing specific details for the plan of care and counseling regarding the diagnosis and prognosis. Their questions are answered at this time and they are agreeable with the plan of admission.    --------------------------------- ADDITIONAL PROVIDER NOTES ---------------------------------  Consultations:  Spoke with . Discussed case. They will admit the patient. This patient's ED course included: a personal history and physicial examination, re-evaluation prior to disposition, and IV medications    This patient has remained hemodynamically stable during their ED course. Diagnosis:  1. Suicidal ideation    2. Suicide attempt ([address]    3. Homicidal ideation    4. History of schizophrenia    5. Auditory hallucination      Disposition:  Patient's disposition: Admit to mental health unit - medically cleared for admission  Patient's condition is stable. 9/26/22, 3:14 PM EDT.     This note is prepared by Андрей Wallace MD -PGY- 3               Андрей Wallace MD  Resident  09/26/22 5184

## 2022-09-27 PROBLEM — F60.3 BORDERLINE PERSONALITY DISORDER (HCC): Status: ACTIVE | Noted: 2022-09-27

## 2022-09-27 PROBLEM — F31.2 SEVERE MANIC BIPOLAR 1 DISORDER WITH PSYCHOTIC BEHAVIOR (HCC): Status: ACTIVE | Noted: 2022-09-27

## 2022-09-27 PROBLEM — F79 INTELLECTUAL DISABILITY: Status: ACTIVE | Noted: 2022-09-27

## 2022-09-27 LAB
EKG ATRIAL RATE: 90 BPM
EKG P AXIS: 40 DEGREES
EKG P-R INTERVAL: 146 MS
EKG Q-T INTERVAL: 328 MS
EKG QRS DURATION: 72 MS
EKG QTC CALCULATION (BAZETT): 401 MS
EKG R AXIS: 25 DEGREES
EKG T AXIS: 0 DEGREES
EKG VENTRICULAR RATE: 90 BPM

## 2022-09-27 PROCEDURE — 6360000002 HC RX W HCPCS: Performed by: NURSE PRACTITIONER

## 2022-09-27 PROCEDURE — 90792 PSYCH DIAG EVAL W/MED SRVCS: CPT | Performed by: NURSE PRACTITIONER

## 2022-09-27 PROCEDURE — 6370000000 HC RX 637 (ALT 250 FOR IP): Performed by: NURSE PRACTITIONER

## 2022-09-27 PROCEDURE — 1240000000 HC EMOTIONAL WELLNESS R&B

## 2022-09-27 RX ORDER — LEVETIRACETAM 500 MG/1
1000 TABLET ORAL 2 TIMES DAILY
Status: DISCONTINUED | OUTPATIENT
Start: 2022-09-27 | End: 2022-10-01 | Stop reason: HOSPADM

## 2022-09-27 RX ORDER — LORAZEPAM 2 MG/ML
2 INJECTION INTRAMUSCULAR EVERY 6 HOURS PRN
Status: DISCONTINUED | OUTPATIENT
Start: 2022-09-27 | End: 2022-10-01 | Stop reason: HOSPADM

## 2022-09-27 RX ORDER — DIVALPROEX SODIUM 500 MG/1
500 TABLET, DELAYED RELEASE ORAL EVERY 12 HOURS SCHEDULED
Status: DISCONTINUED | OUTPATIENT
Start: 2022-09-27 | End: 2022-09-27

## 2022-09-27 RX ORDER — DIVALPROEX SODIUM 500 MG/1
500 TABLET, DELAYED RELEASE ORAL 3 TIMES DAILY
Status: DISCONTINUED | OUTPATIENT
Start: 2022-09-27 | End: 2022-10-01 | Stop reason: HOSPADM

## 2022-09-27 RX ORDER — DIPHENHYDRAMINE HYDROCHLORIDE 50 MG/ML
50 INJECTION INTRAMUSCULAR; INTRAVENOUS EVERY 6 HOURS PRN
Status: DISCONTINUED | OUTPATIENT
Start: 2022-09-27 | End: 2022-10-01 | Stop reason: HOSPADM

## 2022-09-27 RX ORDER — OLANZAPINE 5 MG/1
10 TABLET, ORALLY DISINTEGRATING ORAL 2 TIMES DAILY
Status: DISCONTINUED | OUTPATIENT
Start: 2022-09-27 | End: 2022-10-01 | Stop reason: HOSPADM

## 2022-09-27 RX ORDER — LITHIUM CARBONATE 300 MG/1
300 CAPSULE ORAL
Status: DISCONTINUED | OUTPATIENT
Start: 2022-09-27 | End: 2022-10-01 | Stop reason: HOSPADM

## 2022-09-27 RX ADMIN — OLANZAPINE 10 MG: 5 TABLET, ORALLY DISINTEGRATING ORAL at 21:04

## 2022-09-27 RX ADMIN — LITHIUM CARBONATE 300 MG: 300 CAPSULE ORAL at 11:25

## 2022-09-27 RX ADMIN — OLANZAPINE 10 MG: 5 TABLET, ORALLY DISINTEGRATING ORAL at 11:25

## 2022-09-27 RX ADMIN — DIVALPROEX SODIUM 500 MG: 500 TABLET, DELAYED RELEASE ORAL at 11:25

## 2022-09-27 RX ADMIN — MELATONIN 3 MG ORAL TABLET 3 MG: 3 TABLET ORAL at 21:04

## 2022-09-27 RX ADMIN — DIVALPROEX SODIUM 500 MG: 500 TABLET, DELAYED RELEASE ORAL at 16:54

## 2022-09-27 RX ADMIN — LEVETIRACETAM 1000 MG: 500 TABLET, FILM COATED ORAL at 09:22

## 2022-09-27 RX ADMIN — LEVETIRACETAM 1000 MG: 500 TABLET, FILM COATED ORAL at 21:04

## 2022-09-27 RX ADMIN — LITHIUM CARBONATE 300 MG: 300 CAPSULE ORAL at 16:54

## 2022-09-27 RX ADMIN — DIPHENHYDRAMINE HYDROCHLORIDE 50 MG: 50 INJECTION, SOLUTION INTRAMUSCULAR; INTRAVENOUS at 09:35

## 2022-09-27 RX ADMIN — HALOPERIDOL LACTATE 5 MG: 5 INJECTION, SOLUTION INTRAMUSCULAR at 09:35

## 2022-09-27 RX ADMIN — LORAZEPAM 2 MG: 2 INJECTION INTRAMUSCULAR; INTRAVENOUS at 09:35

## 2022-09-27 RX ADMIN — ACETAMINOPHEN 650 MG: 325 TABLET, FILM COATED ORAL at 21:03

## 2022-09-27 ASSESSMENT — LIFESTYLE VARIABLES
HOW OFTEN DO YOU HAVE A DRINK CONTAINING ALCOHOL: NEVER
HOW MANY STANDARD DRINKS CONTAINING ALCOHOL DO YOU HAVE ON A TYPICAL DAY: PATIENT DOES NOT DRINK
HOW MANY STANDARD DRINKS CONTAINING ALCOHOL DO YOU HAVE ON A TYPICAL DAY: PATIENT DOES NOT DRINK
HOW OFTEN DO YOU HAVE A DRINK CONTAINING ALCOHOL: NEVER

## 2022-09-27 ASSESSMENT — SLEEP AND FATIGUE QUESTIONNAIRES
AVERAGE NUMBER OF SLEEP HOURS: 8
AVERAGE NUMBER OF SLEEP HOURS: 8
DO YOU USE A SLEEP AID: NO
DO YOU HAVE DIFFICULTY SLEEPING: NO
DO YOU USE A SLEEP AID: NO
DO YOU HAVE DIFFICULTY SLEEPING: NO

## 2022-09-27 ASSESSMENT — PAIN DESCRIPTION - LOCATION: LOCATION: FLANK

## 2022-09-27 ASSESSMENT — PAIN DESCRIPTION - DESCRIPTORS: DESCRIPTORS: STABBING

## 2022-09-27 ASSESSMENT — PAIN SCALES - GENERAL: PAINLEVEL_OUTOF10: 10

## 2022-09-27 ASSESSMENT — PAIN - FUNCTIONAL ASSESSMENT: PAIN_FUNCTIONAL_ASSESSMENT: ACTIVITIES ARE NOT PREVENTED

## 2022-09-27 ASSESSMENT — PAIN DESCRIPTION - ORIENTATION: ORIENTATION: LEFT

## 2022-09-27 NOTE — ED NOTES
Spoke with Willa Prado on call for Dr. Una Palm reviewed case ok to admit to [facility] for inpatient care.       Juliana Mauricio RN  09/26/22 6580

## 2022-09-27 NOTE — ED NOTES
Patient pacing around Stated that she has thought of hurting/choking herself. Upset that nurse wont let her go to the bathroom herself. Was able to redirect back to room at this time.       Lily Park RN  09/26/22 2047

## 2022-09-27 NOTE — ED NOTES
Call placed to [facility]. Nurse stated that he would call back when he was ready to get report on patient.       Bassem Avila RN  09/26/22 1898

## 2022-09-27 NOTE — PROGRESS NOTES
[address] [facility]  Admission Note     Admitted 24 y/o A/A Autistic female who resides at [facility] for better living c/o hearing voices to hurt herself and no longer hurt others was tying her shoelaces together told staff she was going to hang self she is childlike unable to read oriented to unit and room she was sleepy during admission interview from receiving HS meds and ativan in the ER     Admission Type:   Admission Type: Involuntary    Reason for admission:  Reason for Admission: \"hearing voices hurt myself\"      Addictive Behavior:   Addictive Behavior  In the Past 3 Months, Have You Felt or Has Someone Told You That You Have a Problem With  : None    Medical Problems:   Past Medical History:   Diagnosis Date    ADHD     Autism disorder     Colitis     Schizophrenia ([facility] [address]     Seizures ([facility] [address]        Status EXAM:  Mental Status and Behavioral Exam  Normal: Yes  Level of Assistance: Independent/Self  Facial Expression: Avoids Gaze  Affect: Blunt  Level of Consciousness: Alert  Frequency of Checks: 4 times per hour, close  Mood:Normal: No  Mood: Depressed, Anxious, Sad  Motor Activity:Normal: Yes  Eye Contact: Fair  Observed Behavior: Cooperative  Sexual Misconduct History: Current - no  Preception: Arley to person, Arley to time, Arley to place  Attention:Normal: No  Attention: Distractible, Unable to concentrate  Thought Processes: Blocking  Depression Symptoms: Impaired concentration  Anxiety Symptoms: Generalized  Jovita Symptoms: No problems reported or observed. Hallucinations:  Auditory (comment)  Delusions: No  Memory:Normal: Yes  Insight and Judgment: No  Insight and Judgment: Poor judgment, Poor insight    Tobacco Screening:  Practical Counseling, on admission, kristie X, if applicable and completed (first 3 are required if patient doesn't refuse):            ( ) Recognizing danger situations (included triggers and roadblocks)                    ( ) Coping skills (new ways to manage stress,relaxation techniques, changing routine, distraction)                                                           ( ) Basic information about quitting (benefits of quitting, techniques in how to quit, available resources  ( ) Referral for counseling faxed to [facility]                                                                                                                   ( ) Patient refused counseling  (x ) Patient has not smoked in the last 30 days    Metabolic Screening:    No results found for: LABA1C    No results found for: CHOL  No results found for: TRIG  No results found for: HDL  No components found for: LDLCAL  No results found for: LABVLDL      There is no height or weight on file to calculate BMI.     BP Readings from Last 2 Encounters:   09/26/22 136/87   09/20/22 112/77           Pt admitted with followings belongings:       Esa Bhatti RN

## 2022-09-27 NOTE — ED NOTES
Pt has been accepted to [address] by Dr. Marsh Revering    Disposition called to Javi Cowan in admitting, Room 7528     [facility]  09/26/22 2293

## 2022-09-27 NOTE — CARE COORDINATION
Navigator placed phone call to [facility], left vm requesting call back.     Electronically signed by KATIE Rahman LSW on 9/27/2022 at 12:32 PM
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
## 2022-09-27 NOTE — BH NOTE
0920 hours, Pt states she's hearing voices of demons telling her to hurt people and hurt herself, unable to contract for safety. Pt attention seeking, childlike, needy, needing frequent redirection, poor insight. Staff keeping pt in line of sight, no somatic complaints noted, Q 15 minute checks for his/her safety and protection. 0935 hours, Pt agitated, yelling at other pts, threatening to harm other pts and herself, scratching her wrist with her fingernail, not redirectable. Pt going towards another pt, yelling, verbally aggressive, stating she is going to harm him, not redirectable. Staff physically needed to restrain pt from harming others and took her back to her room. Pt had pencil in her hand, threatening  to stab her wrist then threatening to stab staff. Pt needed to physically have pencil taken from her by . Pt agreed to injections, received haldol 5 mg and ativan 2 mg as well as benadryl 50 mg IM, will assess for results. Pt continues to threaten staff and pts with physical harm, unable to contract for safety, taken to unlocked seclusion room for her safety and the safety of others. 0945 hours, Pt attempting to tie sheet around her neck, staff physically had to take sheet away from pt. Pt continues to threaten to harm self and others, remains in unlocked seclusion room for her safety and the safety of others. 0955 hours, Pt agitated, trying to leave the unlocked seclusion room to physically harm others, verbally threatening staff, not redirectable. KENDRICK Maggy notified, verbal order received for locked seclusion for her safety and the safety of others. Pt placed in locked seclusion for her safety and the safety of others. 1015 hours, Pt took socks off andt armando them together, socks taken from pt for her safety by staff, continues to threaten self and others with physical harm.     1030 hours, With multiple staff present, pt taken to bathroom, female staff with pt at all times, returned to locked seclusion for her safety and the safety of others. .    1251 hours, Pt has been sleeping now for approximately one hour, locked seclusion discontinued, pt sleeping so unable to do debriefing or explain behavior plan at this time. 1320 hours, Pt awoke, staff explained behavioral plan, pt verbalized an understanding and signed with a crayon, will need reinforced. Pt ate lunch at table in front of nurses station. Pt denies suicidal or homicidal ideation, contracts for safety, denies hallucinations, no somatic complaints noted, Q 15 minute checks for his/her safety and protection. 1420 hours, Pt resting quietly in bed, bed stripped for pt's safety and protection. 1840 hours, Pt states she had an emesis, not witnessed or seen in toilet. Pt's group home told this nurse that pt will say she's vomiting or having chest pains for attention and secondary gain.

## 2022-09-27 NOTE — PLAN OF CARE
Problem: Anxiety  Goal: Will report anxiety at manageable levels  Description: INTERVENTIONS:  1. Administer medication as ordered  2. Teach and rehearse alternative coping skills  3. Provide emotional support with 1:1 interaction with staff  9/27/2022 0807 by Jareth Miller RN  Outcome: Progressing  9/27/2022 0807 by Jareth Miller RN  Outcome: Progressing  Flowsheets  Taken 9/27/2022 0759 by Jareth Miller RN  Will report anxiety at manageable levels:   Provide emotional support with 1:1 interaction with staff   Administer medication as ordered   Teach and rehearse alternative coping skills  Taken 9/27/2022 0056 by Geovanny Chacon RN  Will report anxiety at manageable levels:   Administer medication as ordered   Provide emotional support with 1:1 interaction with staff   Teach and rehearse alternative coping skills     Problem: Coping  Goal: Pt/Family able to verbalize concerns and demonstrate effective coping strategies  Description: INTERVENTIONS:  1. Assist patient/family to identify coping skills, available support systems and cultural and spiritual values  2. Provide emotional support, including active listening and acknowledgement of concerns of patient and caregivers  3. Reduce environmental stimuli, as able  4. Instruct patient/family in relaxation techniques, as appropriate  5. Assess for spiritual pain/suffering and initiate Spiritual Care, Psychosocial Clinical Specialist consults as needed  Outcome: Progressing  Flowsheets (Taken 9/27/2022 0056 by Geovanny Chacon RN)  Patient/family able to verbalize anxieties, fears, and concerns, and demonstrate effective coping: Assist patient/family to identify coping skills, available support systems and cultural and spiritual values     Problem: Behavior  Goal: Pt/Family maintain appropriate behavior and adhere to behavioral management agreement, if implemented  Description: INTERVENTIONS:  1.  Assess patient/family's coping skills and  non-compliant behavior (including use of illegal substances)  2. Notify security of behavior or suspected illegal substances which indicate the need for search of the family and/or belongings  3. Encourage verbalization of thoughts and concerns in a socially appropriate manner  4. Utilize positive, consistent limit setting strategies supporting safety of patient, staff and others  5. Encourage participation in the decision making process about the behavioral management agreement  6. If a visitor's behavior poses a threat to safety call refer to organization policy. 7. Initiate consult with , Psychosocial CNS, Spiritual Care as appropriate  Outcome: Progressing  Flowsheets (Taken 9/27/2022 0056 by Luz Locke RN)  Patient/family maintains appropriate behavior and adheres to behavioral management agreement, if implemented:   Initiate consult with , Psychosocial Clinical Nurse Specialist, Spiritual Care as appropriate   Assess patient/familys coping skills and  non-compliant behavior (including use of illegal substances)   Encourage verbalization of thoughts and concerns in a socially appropriate manner     Problem: Involuntary Admit  Goal: Will cooperate with staff recommendations and doctor's orders and will demonstrate appropriate behavior  Description: INTERVENTIONS:  1. Treat underlying conditions and offer medication as ordered  2. Educate regarding involuntary admission procedures and rules  3. Contain excessive/inappropriate behavior per unit and hospital policies  Outcome: Progressing     Problem: Safety - Violent/Self-destructive Restraint  Goal: Remains free of injury from restraints (Restraint for Violent/Self-Destructive Behavior)  Description: INTERVENTIONS:  1. Determine that de-escalation and other, less restrictive measures have been tried or would not be effective before applying the restraint  2. Identify and document the criteria for restraint  3.  Evaluate the patient's condition at the time of restraint application  4. Inform patient/family regarding the reason for restraint/seclusion  5. Q2H: Monitor comfort, nutrition and hydration needs  6. Q15M: Perform safety checks including skin, circulation, sensory, respiratory and psychological status  7. Ensure continuous observation  8. Identify and implement measures to help patient regain control, assess readiness for release and initiate progressive release per policy  Outcome: Progressing  Flowsheets (Taken 9/27/2022 0955)  Remains Free of Injury from Restraints (Restraint for Violent/Self-destructive Behavior):   Determine that de-escalation and other, less restrictive measures have been tried or would not be effective before applying the restraint   Identify and document the criteria for restraint   Inform patient/family regarding the reason for restraint/seclusion   Every 2 hours: Monitor comfort, nutrition and hydration needs   Every 15 minutes: Perform safety checks including skin, circulation, sensory, respiratory and psychological status   Ensure continuous observation   Identify and implement measures to help patient regain control, assess readiness for release and initiate progressive release per policy     7274 hours, Pt agitated, trying to leave the unlocked seclusion room to physically harm others, verbally threatening staff, not redirectable. CNP Maggy notified, verbal order received for locked seclusion for her safety and the safety of others. Pt placed in locked seclusion for her safety and the safety of others.

## 2022-09-27 NOTE — ED NOTES
Spoke with Dr. Diana Wiseman about patient behavior and reviewed home meds ok to place orders for home meds and she placed order for Ativan 1mg po at this time. Patient took ativan without difficulty. And laid down in bed. Care giver from [facility] at bedside now.       Adin Robles RN  09/26/22 5538

## 2022-09-27 NOTE — H&P
Department of Psychiatry  History and Physical - Adult     CHIEF COMPLAINT: \"    Patient was seen after discussing with the treatment team and reviewing the chart    CIRCUMSTANCES OF ADMISSION: presented to the ED sent in by group home that she wants to kill her self with a plan of choking herself and wants to die. HISTORY OF PRESENT ILLNESS:      The patient is a 25 y.o. female with significant past history of bipolar disorder, limited intellectual l functioning and borderline personality disorder with long history of violence as well as self injures behavior presented to the ED sent in by group home that she wants to kill her self with a plan of choking herself and wants to die. She reported that she tried to take her shoelaces off and attempt to strangle herself today. She is hears voices yelling at her commanding to kill her self and states that earlier today the voices told her to walk into traffic and to stay there until she was killed. She also indicated that she want to fight or hurt someone and that she wants to take her socks off to strangle herself. In the ED her urine drug screen was negative her blood alcohol level is negative she was medically clear admitted 7 SE. on psychiatric unit for further psychiatric assessment stabilization and treatment    Shortly after patient arrived to the unit patient reportedly had a period of agitation she does not you stab her self with a pencil she was unable to be redirected and required IM injections and seclusion reportedly while patient was in the seclusion room she tried to take her socks off and was attempting to strangle self with that. Patient is been very difficult to redirect medications have very little effect on the patient. She is agitated she been threatening to hurt her self threatening to hurt others when she leaves the seclusion room.   Prior to her period of agitation she was able to be assessed by the  who patient told that she hears demons talking her head telling her to hurt herself and other people. Per social workers note appears the patient works at the [employer]. History is extremely limited this time all history is taken from the chart as patient received stat IM injections currently in seclusion      PAST PSYCHIATRIC HISTORY:  She';s had multiple inpatient psychiatric admissions at starting as a teenager Claudia Espinoza she has been to [facility] multiple times in the past she is also admitted to [facility] for psychiatry in August not sure for outpatient agency. Per [facility] records her mom is the guardian however surgical port records by patient navigator's does not show that patient has a current guardian. Patient has a history of multiple previous self-injurious behaviors. Patient she has been on [facility]ina in the past.     Family psychiatric history: Patient indicated that mother has \"eye disease. \"  And patient has a younger sister who also has significant mental health concerns as well    PAST MEDICAL HISTORY:  The patient has history of seizure disorder for which she gets Keppra and also has chronic Crohn's disease, currently stable. SUBSTANCE ABUSE HISTORY:  The patient denies. Drug screen negative. Alcohol negative. PERSONAL/FAMILY/SOCIAL HISTORY:   Patient is never , has no children.   She currently lives at [facility] for better living does not appear to be open with MRDD services indicated  that she has a good relationship with her mom per her notes patient has 4 sisters not clear if patient has a legal guardian per the chart patient may be working at the [employer]      Past Medical History:        Diagnosis Date    ADHD     Autism disorder     Colitis     Schizophrenia ([facility] [address]     Seizures ([facility] [address]        Medications Prior to Admission:   Medications Prior to Admission: ARIPiprazole ER (ABILIFY MAINTENA) 400 MG SRER, Inject 400 mg into the muscle every 30 days Last given 9/2/22  amLODIPine (NORVASC) 5 MG tablet, Take 5 mg by mouth daily  LORazepam (ATIVAN) 0.5 MG tablet, Take 0.5 mg by mouth in the morning and at bedtime. propranolol (INDERAL) 10 MG tablet, Take 10 mg by mouth 3 times daily  QUEtiapine (SEROQUEL) 100 MG tablet, Take 100 mg by mouth daily In am  QUEtiapine (SEROQUEL) 200 MG tablet, Take 200 mg by mouth at bedtime At bedtime  [DISCONTINUED] ARIPiprazole ER (ABILIFY MAINTENA) 300 MG SRER injection, Inject 300 mg into the muscle every 30 days  lithium 300 MG capsule, Take 300 mg by mouth 3 times daily (with meals)  levETIRAcetam (KEPPRA) 1000 MG tablet, Take 1,000 mg by mouth 2 times daily   pantoprazole (PROTONIX) 40 MG tablet, Take 40 mg by mouth daily     Past Surgical History:        Procedure Laterality Date    BREAST SURGERY Right     lumpectomy    LAPAROSCOPY Left 8/9/2019    LAPAROSCOPY, REMOVAL OF LEFT OVARIAN MASS, PERITONEAL WASHINGS FOR CELL BLOCK performed by Mariaa Lemos MD at [address]:   Dye [barium-containing compounds], Sulfa antibiotics, and Versed [midazolam]    Family History  Family History   Problem Relation Age of Onset    Diabetes Mother     Other Mother         lupus             EXAMINATION:    REVIEW OF SYSTEMS:    ROS:  [x] All negative/unchanged except if checked.  Explain positive(checked items) below:  [] Constitutional  [] Eyes  [] Ear/Nose/Mouth/Throat  [] Respiratory  [] CV  [] GI  []   [] Musculoskeletal  [] Skin/Breast  [] Neurological  [] Endocrine  [] Heme/Lymph  [] Allergic/Immunologic    Explanation:     Vitals:  BP (!) 108/58   Pulse 80   Temp 97.9 °F (36.6 °C) (Tympanic)   Resp 16   Ht 5' 5\" (1.651 m)   Wt 240 lb (108.9 kg)   LMP 09/04/2022 (Approximate)   SpO2 97%   Breastfeeding Unknown   BMI 39.94 kg/m²      Physical Examination:   Head: x  Atraumatic: x normocephalic  Skin and Mucosa        Moist x  Dry   Pale  x Normal   Neck:  Thyroid  Palpable   x  Not palpable   venus distention   adenopathy   Chest: x Clear   Rhonchi     Wheezing   CV:  xS1   xS2    xNo murmer   Abdomen:  x  Soft    Tender    Viceromegaly   Extremities:  x No Edema     Edema     Cranial Nerves Examination:   CN II:   xPupils are reactive to light  Pupils are non reactive to light  CN III, IV, VI:  xNo eye deviation    No diplopia or ptosis   CN V:    xFacial Sensation is intact     Facial Sensation is not intact   CN IIIV:   x Hearing is normal to rubbing fingers   CN IX, X:     xNormal gag reflex and phonation   CN XI:   xShoulder shrug and neck rotation is normal  CNXII:    xTongue is midline no deviation or atrophy    Mental Status Examination:    Unable to perform mental status examination this time as patient is not able to be assessed she is in locked seclusion she has made multiple suicidal homicidal threats and indicated to staff that she hears voices of demons telling her to hurt her self      DIAGNOSIS:  Bipolar 1 manic with psychosis  Intellectual disability  Borderline personality disorder        LABS: REVIEWED TODAY:  Recent Labs     09/26/22  1642   WBC 11.5   HGB 12.6        Recent Labs     09/26/22  1642      K 4.6   *   CO2 20*   BUN 14   CREATININE 1.3*   GLUCOSE 100*     Recent Labs     09/26/22  1642   BILITOT 0.2   ALKPHOS 82   AST 22   ALT 26     Lab Results   Component Value Date/Time    LABAMPH NOT DETECTED 09/26/2022 04:42 PM    BARBSCNU NOT DETECTED 09/26/2022 04:42 PM    LABBENZ NOT DETECTED 09/26/2022 04:42 PM    LABMETH NOT DETECTED 09/26/2022 04:42 PM    OPIATESCREENURINE NOT DETECTED 09/26/2022 04:42 PM    PHENCYCLIDINESCREENURINE NOT DETECTED 09/26/2022 04:42 PM    ETOH <10 09/26/2022 04:42 PM     Lab Results   Component Value Date/Time    TSH 2.140 06/23/2022 05:19 AM     Lab Results   Component Value Date    LITHIUM 0.27 (L) 06/23/2022     No results found for: VALPROATE, CBMZ  Lab Results   Component Value Date/Time    LITHIUM 0.27 06/23/2022 05:19 AM         Radiology XR CHEST (2 VW)    Result Date: 9/20/2022  EXAMINATION: TWO XRAY VIEWS OF THE CHEST 9/20/2022 12:38 pm COMPARISON: 07/21/2022 HISTORY: ORDERING SYSTEM PROVIDED HISTORY: cp TECHNOLOGIST PROVIDED HISTORY: Reason for exam:->cp What reading provider will be dictating this exam?->CRC FINDINGS: The lungs are without acute focal process. There is no effusion or pneumothorax. The cardiomediastinal silhouette is without acute process. The osseous structures are without acute process. No acute process. US OB LESS THAN 14 WEEKS SINGLE OR FIRST GESTATION    Result Date: 9/19/2022  EXAMINATION: FIRST TRIMESTER OBSTETRIC ULTRASOUND; TRANSABDOMINAL FIRST TRIMESTER OBSTETRIC PELVIC ULTRASOUND WITH COLOR DOPPLER FLOW 9/19/2022 TECHNIQUE: Transvaginal first trimester obstetric pelvic ultrasound was performed with color Doppler flow evaluation.; TRANSABDOMINAL PELVIC ULTRASOUND WITH COLOR DOPPLER FLOW COMPARISON: Ultrasound pelvis from March 1, 2019 HISTORY: ORDERING SYSTEM PROVIDED HISTORY: bleeding TECHNOLOGIST PROVIDED HISTORY: Reason for exam:->bleeding What reading provider will be dictating this exam?->CRC FINDINGS: Uterus: 7.4 cm. No uterine masses. Endometrium measured 13 mm. Gestational Sac(s): An intrauterine gestational sac is not identified on this exam. Right ovary: 4.0 x 2.8 x 2.7 cm. Normal appearance of the right ovary. Normal color flow and arterial/venous spectral waveforms. No right adnexal mass. Left ovary: 3.2 x 2.7 x 2.5 cm. Normal appearance of the left ovary. Normal color flow and arterial/venous spectral waveforms. No left adnexal mass. Free fluid: There is small amount of bilateral paraovarian fluid. Small amount of simple appearing free fluid in the cul-de-sac. Measurements: Clinical age provided is 5 weeks and 1 day     1. Pregnancy of unknown location. An intrauterine gestational sac is not identified on this exam.  Endometrium measured 13 mm.  2.  Normal appearance of the bilateral ovaries. No adnexal masses. Normal ovarian vascularity. 3.  Simple appearing free fluid is identified in the pelvis. Recommendation: Suggest trending quantitative beta HCG levels with repeat pelvic ultrasound in 4-6 weeks if there is appropriate rise in quantitative beta hCG levels. Imaging should occur sooner for worsening symptoms. US OB TRANSVAGINAL    Result Date: 9/19/2022  EXAMINATION: FIRST TRIMESTER OBSTETRIC ULTRASOUND; TRANSABDOMINAL FIRST TRIMESTER OBSTETRIC PELVIC ULTRASOUND WITH COLOR DOPPLER FLOW 9/19/2022 TECHNIQUE: Transvaginal first trimester obstetric pelvic ultrasound was performed with color Doppler flow evaluation.; TRANSABDOMINAL PELVIC ULTRASOUND WITH COLOR DOPPLER FLOW COMPARISON: Ultrasound pelvis from March 1, 2019 HISTORY: ORDERING SYSTEM PROVIDED HISTORY: bleeding TECHNOLOGIST PROVIDED HISTORY: Reason for exam:->bleeding What reading provider will be dictating this exam?->CRC FINDINGS: Uterus: 7.4 cm. No uterine masses. Endometrium measured 13 mm. Gestational Sac(s): An intrauterine gestational sac is not identified on this exam. Right ovary: 4.0 x 2.8 x 2.7 cm. Normal appearance of the right ovary. Normal color flow and arterial/venous spectral waveforms. No right adnexal mass. Left ovary: 3.2 x 2.7 x 2.5 cm. Normal appearance of the left ovary. Normal color flow and arterial/venous spectral waveforms. No left adnexal mass. Free fluid: There is small amount of bilateral paraovarian fluid. Small amount of simple appearing free fluid in the cul-de-sac. Measurements: Clinical age provided is 5 weeks and 1 day     1. Pregnancy of unknown location. An intrauterine gestational sac is not identified on this exam.  Endometrium measured 13 mm. 2.  Normal appearance of the bilateral ovaries. No adnexal masses. Normal ovarian vascularity. 3.  Simple appearing free fluid is identified in the pelvis.  Recommendation: Suggest trending quantitative beta HCG levels with repeat pelvic ultrasound in 4-6 weeks if there is appropriate rise in quantitative beta hCG levels. Imaging should occur sooner for worsening symptoms. TREATMENT PLAN:    Risk Management: Based on the diagnosis and assessment biopsychosocial treatment model was presented to the patient and was given the opportunity to ask any question. The patient was agreeable to the plan and all the patient's questions were answered to the patient's satisfaction. I discussed with the patient the risk, benefit, alternative and common side effects for the proposed medication treatment. The patient is consenting to this treatment. Collateral Information:  Will obtain collateral information from the family or friends. Will obtain medical records as appropriate from out patient providers  Will consult the hospitalist for a physical exam to rule out any co-morbid physical condition. Home medication Reconciled       New Medications started during this admission :        Prn Haldol 5mg and Vistaril 50mg q6hr for extreme agitation. Trazodone as ordered for insomnia  Vistaril as ordered for anxiety      Psychotherapy:   Encourage participation in milieu and group therapy  Individual therapy as needed      Patient's diagnosis, treatment plan, medication management was formulated at the end of evaluation and after reviewing relevant documentation. Patient was seen directly by myself and Dr. Mercy Lemons 500 mg 3 times daily  Lithium 300 mg 3 times daily  Zydis 10 mg twice daily    Will need to verify patient is on a long-acting injections    Can discontinue constant observer. Patient is deemed to be moderate risk for suicide based on productive risk factors as well as risk mitigation    Risk Factors: Pt has a mental health history, pt has a previous suicide attempt, pt has been having AH, Pt has seizure history, pt has limited education.       Protective Factors: Pt has good support from her group home, pt has been compliant with medications, pt has SSI and steady income, pt has access to safe and stable housing, pt has access to basic needs, pt reported that she is working. Behavioral Services  Medicare Certification Upon Admission    I certify that this patient's inpatient psychiatric hospital admission is medically necessary for:    [x] (1) Treatment which could reasonably be expected to improve this patient's condition,       [ ] (2) Or for diagnostic study;     AND     [x](2) The inpatient psychiatric services are provided while the individual is under the care of a physician and are included in the individualized plan of care.     Estimated length of stay/service 3 to 7 days based on stability    Plan for post-hospital care outpatient psychiatric and counseling services    Electronically signed by RALPH Sanchez CNP on 0/78/5824 at 9:40 AM

## 2022-09-27 NOTE — CARE COORDINATION
[facility] to send contact information for [facility] supervisor for hospital to reach out and obtain collateral/discuss discharge planning. Waiting to receive information.     Electronically signed by KATIE Sandra, SKYLAR on 9/27/2022 at 11:57 AM

## 2022-09-27 NOTE — PROGRESS NOTES
Attended evening relaxation group. Willing to sit and listen with no verbal responses. Left activity early after approx. 15 minutes. Was 1 of 15 in attendance.

## 2022-09-27 NOTE — PLAN OF CARE
Problem: Anxiety  Goal: Will report anxiety at manageable levels  Description: INTERVENTIONS:  1. Administer medication as ordered  2. Teach and rehearse alternative coping skills  3. Provide emotional support with 1:1 interaction with staff  9/27/2022 0807 by Carine Lai RN  Outcome: Progressing  9/27/2022 0807 by Carine Lai RN  Outcome: Progressing  Flowsheets  Taken 9/27/2022 0759 by Carine Lai RN  Will report anxiety at manageable levels:   Provide emotional support with 1:1 interaction with staff   Administer medication as ordered   Teach and rehearse alternative coping skills  Taken 9/27/2022 0056 by Arina Maldonado RN  Will report anxiety at manageable levels:   Administer medication as ordered   Provide emotional support with 1:1 interaction with staff   Teach and rehearse alternative coping skills     Problem: Coping  Goal: Pt/Family able to verbalize concerns and demonstrate effective coping strategies  Description: INTERVENTIONS:  1. Assist patient/family to identify coping skills, available support systems and cultural and spiritual values  2. Provide emotional support, including active listening and acknowledgement of concerns of patient and caregivers  3. Reduce environmental stimuli, as able  4. Instruct patient/family in relaxation techniques, as appropriate  5. Assess for spiritual pain/suffering and initiate Spiritual Care, Psychosocial Clinical Specialist consults as needed  Outcome: Progressing  Flowsheets (Taken 9/27/2022 0056 by Arina Maldonado RN)  Patient/family able to verbalize anxieties, fears, and concerns, and demonstrate effective coping: Assist patient/family to identify coping skills, available support systems and cultural and spiritual values     Problem: Behavior  Goal: Pt/Family maintain appropriate behavior and adhere to behavioral management agreement, if implemented  Description: INTERVENTIONS:  1.  Assess patient/family's coping skills and  non-compliant behavior (including use of illegal substances)  2. Notify security of behavior or suspected illegal substances which indicate the need for search of the family and/or belongings  3. Encourage verbalization of thoughts and concerns in a socially appropriate manner  4. Utilize positive, consistent limit setting strategies supporting safety of patient, staff and others  5. Encourage participation in the decision making process about the behavioral management agreement  6. If a visitor's behavior poses a threat to safety call refer to organization policy. 7. Initiate consult with , Psychosocial CNS, Spiritual Care as appropriate  Outcome: Progressing  Flowsheets (Taken 9/27/2022 0056 by Marcial Figueredo RN)  Patient/family maintains appropriate behavior and adheres to behavioral management agreement, if implemented:   Initiate consult with , Psychosocial Clinical Nurse Specialist, Spiritual Care as appropriate   Assess patient/familys coping skills and  non-compliant behavior (including use of illegal substances)   Encourage verbalization of thoughts and concerns in a socially appropriate manner     Problem: Involuntary Admit  Goal: Will cooperate with staff recommendations and doctor's orders and will demonstrate appropriate behavior  Description: INTERVENTIONS:  1. Treat underlying conditions and offer medication as ordered  2. Educate regarding involuntary admission procedures and rules  3.  Contain excessive/inappropriate behavior per unit and hospital policies  Outcome: Progressing

## 2022-09-27 NOTE — ED NOTES
Behavioral Health Crisis Assessment      Chief Complaint: \"I want to kill myself. I just want to die. \"    Mental Status Exam: Pt is alert and oriented x3, endorses auditory hallucinations- commands her to kill herself, endorses suicidal and homicidal ideation, polite and cooperative, anxious, ruminating thoughts of wanting to kill herself, insight and judgement is poor, eye contact minimal    Legal Status  [] Voluntary:  [x] Involuntary, Issued by:    Gender  [] Male [x] Female [] Transgender  [] Other    Sexual Orientation    [x] Heterosexual [] Homosexual [] Bisexual [] Other    Brief Clinical Summary:  Pt is a 25year old female presenting to the ED with suicidal ideations with a plan to choke herself. Staff from [facility] brought pt in today stating that pt took her shoe laces off in attempt to strangle herself. Pt is still having intense ideations at this time, stating she wants to die and wants to choke herself. Pt states she hears voices yelling at her, commanding her to kill herself. Pt states earlier today the voices were telling her to walk into traffic and to stay there until she is killed. Pt states she takes her medicine everyday, but does not like taking pills. Pt states the medicine does help get rid of the voices sometimes, but claims they have been non-stop for over a week. Pt reports she attempted suicide by overdose in October 2021. Pt is agitated and keeps saying she wants to fight or hurt someone. Pt denies self harm but states she wants to take her socks off and strangle herself. Pt needs inpatient psych admission to ensure her safety and safety of others.     Collateral Information: none at this time    Risk Factors:  Mental health diagnosis  Prior suicide attempts  History of trauma    Protective Factors:  Group home support  No access to weapons  Safe and stable housing- Group home    Suicidal Ideations:   [x] Reports:    [] Past [x] Present   [] Denies    Suicide Attempts:  [x] Reports: [] Denies    C-SSRS Screening Completed by RN: Current Suicide Risk:  [] No Risk [] Low [] Moderate [x] High    Homicidal Ideations  [x] Reports:   [] Past [x] Present   [] Denies     Self Injurious/Self Mutilation Behaviors:   [] Reports:    [] Past [] Present   [x] Denies    Hallucinations/Delusions   [x] Reports: voices telling her to kill herself  [] Denies     Substance Use/Alcohol Use/Addiction:   [] Reports:   [x] Denies   [x] SBIRT Screen Complete. Current or Past Substance Abuse Treatment  [] Yes, When and Where:  [x] No    Current or Past Mental Health Treatment:  [x] Yes, When and Where:  [] No    Legal Issues:  []  Yes (Specify)  [x]  No    Access to Weapons:  []  Yes (Specify)  [x]  No    Trauma History  [] Reports:  [x] Denies     Living Situation: group home    Employment: SSI    Education Level: unknown    Violence Risk Screening:        Have you ever thought about hurting someone? []  No  [x]  Yes (Ask the questions listed below)   When? Did you follow through with the thoughts? [] No     [x] Yes- When and what happened? 2.  Have you ever threatened anyone? []  No  [x]  Yes (Ask the questions listed below)   When and what happened? Have you ever threatened someone with a gun, knife or other weapon? []  No  [x]  Yes - When and what happened? 2. Have you ever had an order of protection taken out against you? []  Yes [x]  No  3. Have you ever been arrested due to violence? []  Yes [x]  No  4. Have you ever been cruel to animals?  []  Yes [x]  No    After consideration of C-SSRS screening results, C-SSRS assessments, and this professional's assessment the patient's overall suicide risk assessed to be:  [] No Risk  [] Low   [] Moderate   [x] High     [x] Discussed current suicide risk, protective and risk factors with RN and ED Physician     Disposition   [] Home:   [] Outpatient Provider:   [] Crisis Unit:   [x] Inpatient Psychiatric Unit:  [] Other:                    [facility] Dominique Gonzalez, [place]  09/26/22 8450

## 2022-09-27 NOTE — CARE COORDINATION
Navigator reviewed probate court online dockets in [facility], [address] [place] [address]. No guardianship records on file. Navigator will confirm with the SSA worker if one is assigned.     Electronically signed by KATIE Lux LSW on 9/27/2022 at 12:37 PM

## 2022-09-27 NOTE — CARE COORDINATION
Biopsychosocial Assessment Note    Social work met with patient to complete the biopsychosocial assessment and C-SSRS. Chief Complaint: pt reported that she is currently in the hospital due to \"being suicidal and demons were talking in my head telling me to hurt myself and other people. \"     Mental Status Exam: Pt is alert and oriented x4. Pt's mood is anxious and depressed, pt's affect is flat and blunt. Pt's motor activity was increased and pt was rocking back and forth during assessment. Pt's eye contact is fair. Pt is calm and cooperative. Pt's thought process ws blocked, thought content was preoccupied. . Pt was easily distracted. Pt has poor insight and judgement. Pt appears to have a cognitive impairment/ delay. Pt's speech was muffled, rate is normal, volume was low. Pt admits to SI, AH, and HI towards people in general, no one specific. Pt stated that the demons tell her to hurt people that she does not know. Clinical Summary: pt reported that she has had previous inpatient admissions at [facility] and she has been compliant with her medications but she feels that they are not working. Pt stated that she has been living at [address] [place] [address] and she has access to all her basic needs. Pt denied having any problems with her living environment. Pt denied having a good support system in place. 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 Pt stated that she was raised by her mom and her father left and she has no relationship with him. Pt stated that she has a good relationship with her mom. Pt is single and denied having any children. Pt stated that she has 4 sisters between her mom and dad. Pt is unsure who she is currently active with for mental health services and stated that her mom would know. Pt is unsure of a family history of mental illness and stated that her mom has \"a disease. \" Pt denied any legal history or substance use.  Pt is unsure of her highest level of education. Pt stated that she was suicidal with a plan to strangle herself with her shoe laces. Pt stated that she has been having AH of demons telling her to harm herself and other people. Pt denied having someone in specific that she wants to hurt and stated that it is towards people that she doesn't know. Pt reported that she has one suicide attempt in October 2021 when she attempted to overdose on pills. Pt denied any self injurious behaviors. Pt denied having little interest and pleasure in doing things and denied being hopeless or helpless. Risk Factors: Pt has a mental health history, pt has a previous suicide attempt, pt has been having AH, Pt has seizure history, pt has limited education. Protective Factors: Pt has good support from her group home, pt has been compliant with medications, pt has SSI and steady income, pt has access to safe and stable housing, pt has access to basic needs, pt reported that she is working.      Gender  [] Male [x] Female [] Transgender  [] Other    Sexual Orientation    [x] Heterosexual [] Homosexual [] Bisexual [] Other    Suicidal Ideation  [] Past [x] Present [] Denies     C-SSRS Screening Completed: Current Suicide Risk:  [x] No Risk  [] Low [] Moderate [] High    Homicidal Ideation  [] Past [x] Present [] Denies     Hallucinations/Delusions (Specify type)  [x] Reports [] Denies AH    Current or Past Mental Health Treatment:  [x] Yes, When and Where: past inpatient at [facility], unsure where she is currently active  [] No    Substance Use/Alcohol Use/Addiction  [] Reports [x] Denies     Tobacco Use (within the last 6 months)  [] Reports [x] Denies     Trauma History  [] Reports [x] Denies     Self Injurious/Self Mutilation Behaviors:   [] Reports:    [] Past [] Present   [x] Denies    Legal History:  []  Yes (Specify)    [x] No    Collateral Contact (MERLIN signed)  Name: [facility]  Relationship: Group Home  Number:     Collateral Information: JACOB left a voicemail requesting a call back.        Access to Weapons per Collateral Contact: [] Reports [] Denies     After consideration of C-SSRS screening results, C-SSRS assessments, and this professional's assessment the patient's overall suicide risk assessed to be:  [] None   [] Low   [] Moderate   [x] High     [x] Discussed current suicide risk, protective and risk factors with RN and NP/Psychiatrist.    Discharge Plan:  [] Home:  [] Shelter:  [] Crisis Unit:  [] Substance Abuse Rehab:  [] Nursing Facility:  [x] Other (Specify): [facility]    Follow up Provider: Pt is unsure who she is active with

## 2022-09-27 NOTE — BH NOTE
[facility] Interdisciplinary Treatment Plan NOTE    Review Date & Time: 9/27/2022   1:01 PM      Patient was not in treatment team    Admission Type:   Admission Type: Involuntary    Reason for admission:  Reason for Admission: \"hearing voices hurt myself\"      Estimated Length of Stay Update:  7 days  Estimated Discharge Date Update: 7 days    EDUCATION:   Learner Progress Toward Treatment Goals: Reviewed results and recommendations of this team, Reviewed group plan and strategies, Reviewed signs, symptoms and risk of self harm and violent behavior, and Reviewed goals and plan of care    Method: Individual    Outcome: Needs reinforcement, No evidence of Learning, and Refused Education    PATIENT GOALS: to hurt someone!     PLAN/TREATMENT RECOMMENDATIONS UPDATE:encorage groups and medication compliance    GOALS UPDATE:   Time frame for Short-Term Goals: 3 days    Song Beltran RN

## 2022-09-27 NOTE — PROGRESS NOTES
Unwilling to answer questions to complete leisure assessment. Only offers blank stares to interaction, and no valid response. Will continue to complete as cooperates.

## 2022-09-27 NOTE — CARE COORDINATION
Navigator placed phone call to [facility] and left message requesting call back regarding patient's designated SSA. Waiting on return call.     Electronically signed by KATIE Sandra LSW on 9/27/2022 at 12:35 PM

## 2022-09-27 NOTE — CARE COORDINATION
Navigator received return call from [facility]. Patient's assigned to worker is not through [facility] because she is in an ICF. Eliseo has an assigned  that manages patient case. Navigator still awaiting return call from [address] [place] [address] to determine who collateral/discharge planning can be discussed with.      Electronically signed by KATIE Benitez LSW on 9/27/2022 at 12:42 PM

## 2022-09-27 NOTE — PROGRESS NOTES
Excused from morning groups, as was in seclusion. Unable to obtain daily goal due to agitation and threats to hurt self. Leisure assessment unable to be completed. Attempted x 2 this am.  Observed to be agitated and attempting to hurt self with pencil during first attempt, then medicated and placed in seclusion quickly thereafter. Will continue to complete as improves.

## 2022-09-27 NOTE — PROGRESS NOTES
BEHAVIORAL SERVICES: One - Hour In- Person Review  For Management of Violent or Self - Destructive Behavior    Seclusion/Restraint:  Seclusion    Reason for Intervention: Patient threatening to harm others and self    Response to Intervention:  patient is subdued, but still making threats    Medical Record reviewed and discussed precipitating events/behaviors with RN initiating Intervention:  Yes    Patient Medical Status:  Vital Signs: BP - 122/82, HR - 92, Temp - 97.5 Temporal, O2 - 100%  Respiratory Status: WDL  Circulatory Status: WDL  Skin Integrity:WDL    Orientation: oriented to person, place, and time/date    Mood/Affect: anxious and labile    Speech: Slowed and Soft    Thought Content: Patient states that she would like to choke someone    Thought Processes: Circumstantial    Rationale for continued use of intervention: Patient threatening harm to others and self    Rationale for discontinuing intervention: Patient is able to:     One Hour Review Evaluation Physician Notification:  Completed

## 2022-09-28 PROCEDURE — 6370000000 HC RX 637 (ALT 250 FOR IP): Performed by: NURSE PRACTITIONER

## 2022-09-28 PROCEDURE — 6360000002 HC RX W HCPCS: Performed by: NURSE PRACTITIONER

## 2022-09-28 PROCEDURE — 99232 SBSQ HOSP IP/OBS MODERATE 35: CPT | Performed by: NURSE PRACTITIONER

## 2022-09-28 PROCEDURE — 1240000000 HC EMOTIONAL WELLNESS R&B

## 2022-09-28 RX ADMIN — HALOPERIDOL LACTATE 5 MG: 5 INJECTION, SOLUTION INTRAMUSCULAR at 07:17

## 2022-09-28 RX ADMIN — LORAZEPAM 2 MG: 2 INJECTION INTRAMUSCULAR; INTRAVENOUS at 07:17

## 2022-09-28 RX ADMIN — LITHIUM CARBONATE 300 MG: 300 CAPSULE ORAL at 09:13

## 2022-09-28 RX ADMIN — MELATONIN 3 MG ORAL TABLET 3 MG: 3 TABLET ORAL at 21:17

## 2022-09-28 RX ADMIN — DIVALPROEX SODIUM 500 MG: 500 TABLET, DELAYED RELEASE ORAL at 12:41

## 2022-09-28 RX ADMIN — LITHIUM CARBONATE 300 MG: 300 CAPSULE ORAL at 12:41

## 2022-09-28 RX ADMIN — ACETAMINOPHEN 650 MG: 325 TABLET, FILM COATED ORAL at 16:33

## 2022-09-28 RX ADMIN — DIVALPROEX SODIUM 500 MG: 500 TABLET, DELAYED RELEASE ORAL at 17:10

## 2022-09-28 RX ADMIN — OLANZAPINE 10 MG: 5 TABLET, ORALLY DISINTEGRATING ORAL at 09:13

## 2022-09-28 RX ADMIN — LEVETIRACETAM 1000 MG: 500 TABLET, FILM COATED ORAL at 09:14

## 2022-09-28 RX ADMIN — DIPHENHYDRAMINE HYDROCHLORIDE 50 MG: 50 INJECTION, SOLUTION INTRAMUSCULAR; INTRAVENOUS at 07:17

## 2022-09-28 RX ADMIN — OLANZAPINE 10 MG: 5 TABLET, ORALLY DISINTEGRATING ORAL at 21:17

## 2022-09-28 RX ADMIN — LITHIUM CARBONATE 300 MG: 300 CAPSULE ORAL at 17:10

## 2022-09-28 RX ADMIN — DIVALPROEX SODIUM 500 MG: 500 TABLET, DELAYED RELEASE ORAL at 09:13

## 2022-09-28 RX ADMIN — LEVETIRACETAM 1000 MG: 500 TABLET, FILM COATED ORAL at 21:17

## 2022-09-28 ASSESSMENT — PAIN SCALES - GENERAL
PAINLEVEL_OUTOF10: 0
PAINLEVEL_OUTOF10: 10

## 2022-09-28 ASSESSMENT — PAIN DESCRIPTION - LOCATION: LOCATION: ABDOMEN

## 2022-09-28 ASSESSMENT — PAIN DESCRIPTION - DESCRIPTORS: DESCRIPTORS: STABBING

## 2022-09-28 ASSESSMENT — PAIN - FUNCTIONAL ASSESSMENT: PAIN_FUNCTIONAL_ASSESSMENT: ACTIVITIES ARE NOT PREVENTED

## 2022-09-28 ASSESSMENT — PAIN DESCRIPTION - ORIENTATION: ORIENTATION: RIGHT;LEFT

## 2022-09-28 NOTE — PLAN OF CARE
Problem: Anxiety  Goal: Will report anxiety at manageable levels  Description: INTERVENTIONS:  1. Administer medication as ordered  2. Teach and rehearse alternative coping skills  3. Provide emotional support with 1:1 interaction with staff  Outcome: Progressing     Problem: Coping  Goal: Pt/Family able to verbalize concerns and demonstrate effective coping strategies  Description: INTERVENTIONS:  1. Assist patient/family to identify coping skills, available support systems and cultural and spiritual values  2. Provide emotional support, including active listening and acknowledgement of concerns of patient and caregivers  3. Reduce environmental stimuli, as able  4. Instruct patient/family in relaxation techniques, as appropriate  5. Assess for spiritual pain/suffering and initiate Spiritual Care, Psychosocial Clinical Specialist consults as needed  Outcome: Progressing     Patient remains in line of sight at nurses station, being compliant with behavioral plan. Denies suicidal ideations. Denies homicidal ideations but wants to \"hurt someone with a maroon shirt\"-would not elaborate. Patient was educated on appropriate behavior and to notify staff if she felt any urges to act. Patient reports seeing \"red faces with blood dripping from eyes with horns on head\" and that they were telling her to hurt other people. Purposeful rounding continued.

## 2022-09-28 NOTE — BH NOTE
[facility]  Day 3 Interdisciplinary Treatment Plan NOTE    Review Date & Time: 9/28/22 10:55 AM      Patient was not in treatment team    Estimated Length of Stay Update:  7 days  Estimated Discharge Date Update: 7 days    EDUCATION:   Learner Progress Toward Treatment Goals: Reviewed results and recommendations of this team, Reviewed group plan and strategies, Reviewed signs, symptoms and risk of self harm and violent behavior, and Reviewed goals and plan of care    Method: Individual    Outcome: Needs reinforcement and No evidence of Learning    PATIENT GOALS: to hurt someone    PLAN/TREATMENT RECOMMENDATIONS UPDATE:encourage groups and medication compliance    GOALS UPDATE:   Time frame for Short-Term Goals: 7 days      Justina Eng RN

## 2022-09-28 NOTE — PROGRESS NOTES
BEHAVIORAL HEALTH FOLLOW-UP NOTE     9/28/2022     Patient was seen and examined in person, Chart reviewed   Patient's case discussed with staff/team    Chief Complaint: \"The voices tell me to hurt myself and hurt others. \"    Interim History:     Patient seen this morning in the seclusion room she again had a period of agitation this morning she required IM injections as well as being placed in seclusion. When I saw patient she states she is doing this because \"the voices are telling me to hurt myself and others. \"  She is impulsive easily agitated patient is currently in a behavior plan she sits at a table outside the hallway she reported to staff that she was seeing \"red faces of blood dripping from eyes with horns on her head. \"  And that the faces were telling her to hurt other people.   She does not appear to be internally stimulated or responding to unseen others on the unit      Appetite:   [x] Normal/Unchanged  [] Increased  [] Decreased      Sleep:       [x] Normal/Unchanged  [] Fair       [] Poor              Energy:    [x] Normal/Unchanged  [] Increased  [] Decreased        SI [x] Present  [] Absent    HI  [x]Present  [] Absent     Aggression:  [x] yes  [] no    Patient is [] able  [x] unable to CONTRACT FOR SAFETY at the time my assessment patient indicated that she wanted to hurt others as well as her self she was in seclusion due to these threats  PAST MEDICAL/PSYCHIATRIC HISTORY:   Past Medical History:   Diagnosis Date    ADHD     Autism disorder     Colitis     Schizophrenia ([address]     Seizures ([facility]        FAMILY/SOCIAL HISTORY:  Family History   Problem Relation Age of Onset    Diabetes Mother     Other Mother         lupus     Social History     Socioeconomic History    Marital status: Single     Spouse name: Not on file    Number of children: Not on file    Years of education: Not on file    Highest education level: Not on file   Occupational History    Not on file   Tobacco Use    Smoking status: Never    Smokeless tobacco: Never   Vaping Use    Vaping Use: Never used   Substance and Sexual Activity    Alcohol use: No    Drug use: No    Sexual activity: Not on file   Other Topics Concern    Not on file   Social History Narrative    Not on file     Social Determinants of Health     Financial Resource Strain: Not on file   Food Insecurity: Not on file   Transportation Needs: Not on file   Physical Activity: Not on file   Stress: Not on file   Social Connections: Not on file   Intimate Partner Violence: Not on file   Housing Stability: Not on file           ROS:  [x] All negative/unchanged except if checked.  Explain positive(checked items) below:  [] Constitutional  [] Eyes  [] Ear/Nose/Mouth/Throat  [] Respiratory  [] CV  [] GI  []   [] Musculoskeletal  [] Skin/Breast  [] Neurological  [] Endocrine  [] Heme/Lymph  [] Allergic/Immunologic    Explanation:     MEDICATIONS:    Current Facility-Administered Medications:     levETIRAcetam (KEPPRA) tablet 1,000 mg, 1,000 mg, Oral, BID, Lucianne Dede Dellick, APRN - CNP, 8,294 mg at 09/28/22 8323    lithium capsule 300 mg, 300 mg, Oral, TID WC, Maggy B Dellick, APRN - CNP, 841 mg at 09/28/22 1241    diphenhydrAMINE (BENADRYL) injection 50 mg, 50 mg, IntraMUSCular, T4B PRN, Lucianne Dede Dellick, APRN - CNP, 50 mg at 09/28/22 0717    LORazepam (ATIVAN) injection 2 mg, 2 mg, IntraMUSCular, O9U PRN, Lucianne Dede Dellick, APRN - CNP, 2 mg at 09/28/22 0717    OLANZapine zydis (ZYPREXA) disintegrating tablet 10 mg, 10 mg, Oral, BID, Lucianne Dede Dellick, APRN - CNP, 10 mg at 09/28/22 0913    divalproex (DEPAKOTE) DR tablet 500 mg, 500 mg, Oral, TID, Lucianne Dede Dellick, APRN - CNP, 138 mg at 09/28/22 1241    acetaminophen (TYLENOL) tablet 650 mg, 650 mg, Oral, A0W PRN, Lucianne Dede Dellick, APRN - CNP, 793 mg at 09/27/22 2103    magnesium hydroxide (MILK OF MAGNESIA) 400 MG/5ML suspension 30 mL, 30 mL, Oral, Daily PRN, Racquel Thomas, APRN - CNP    nicotine (NICODERM CQ) 21 MG/24HR 1 patch, 1 patch, TransDERmal, Daily, Meliton Madera, APRN - CNP    aluminum & magnesium hydroxide-simethicone (MAALOX) 200-200-20 MG/5ML suspension 30 mL, 30 mL, Oral, PRN, Eston Benavides Dellick, APRN - CNP    hydrOXYzine pamoate (VISTARIL) capsule 50 mg, 50 mg, Oral, TID PRN, Eston Benavides Dellick, APRN - CNP    haloperidol (HALDOL) tablet 5 mg, 5 mg, Oral, Q6H PRN **OR** haloperidol lactate (HALDOL) injection 5 mg, 5 mg, IntraMUSCular, I4B PRN, Eston Benavides Dellick, APRN - CNP, 5 mg at 09/28/22 0717    melatonin tablet 3 mg, 3 mg, Oral, Nightly, Eston Benavides Dellick, APRN - CNP, 3 mg at 09/27/22 2104      Examination:  /78   Pulse 89   Temp 97.1 °F (36.2 °C) (Oral)   Resp 15   Ht 5' 5\" (1.651 m)   Wt 240 lb (108.9 kg)   LMP 09/04/2022 (Approximate)   SpO2 97%   Breastfeeding Unknown   BMI 39.94 kg/m²   Gait - steady  Medication side effects(SE): Denies    Mental Status Examination:    Level of consciousness:  within normal limits   Appearance:  fair grooming and fair hygiene  Behavior/Motor: She has been aggressive  Attitude toward examiner:  cooperative  Speech:  spontaneous, normal rate and normal volume   Mood: \" I feel a little bit suicidal.\"  Affect: Labile  Thought processes: Linear thought flight of ideas loose associations  Thought content: Reports auditory and visual hallucinations but does not appear to be internally stimulated internally preoccupied endorsed suicidal denies homicidal ideations today  cognition:  oriented to person, place, and time   Concentration intact  Insight Limited  Judgement f limited    ASSESSMENT:   Patient symptoms are:  [] Well controlled  [x] Improving  [] Worsening  [] No change      Diagnosis:   Principal Problem:    Severe manic bipolar 1 disorder with psychotic behavior ([facility] [address]  Active Problems:    Intellectual disability    Borderline personality disorder ([facility] [address] [facility]  Resolved Problems:    * No resolved hospital problems.  *      LABS:    Recent Labs     09/26/22  1642   WBC 11.5   HGB 12.6    Recent Labs     09/26/22  1642      K 4.6   *   CO2 20*   BUN 14   CREATININE 1.3*   GLUCOSE 100*     Recent Labs     09/26/22  1642   BILITOT 0.2   ALKPHOS 82   AST 22   ALT 26     Lab Results   Component Value Date/Time    LABAMPH NOT DETECTED 09/26/2022 04:42 PM    BARBSCNU NOT DETECTED 09/26/2022 04:42 PM    LABBENZ NOT DETECTED 09/26/2022 04:42 PM    LABMETH NOT DETECTED 09/26/2022 04:42 PM    OPIATESCREENURINE NOT DETECTED 09/26/2022 04:42 PM    PHENCYCLIDINESCREENURINE NOT DETECTED 09/26/2022 04:42 PM    ETOH <10 09/26/2022 04:42 PM     Lab Results   Component Value Date/Time    TSH 2.140 06/23/2022 05:19 AM     Lab Results   Component Value Date    LITHIUM 0.27 (L) 06/23/2022     No results found for: VALPROATE, CBMZ        Treatment Plan:  Reviewed current Medications with the patient. Risks, benefits, side effects, drug-to-drug interactions and alternatives to treatment were discussed. Collateral information:   CD evaluation  Encourage patient to attend group and other milieu activities.   Discharge planning discussed with the patient and treatment team.  Depakote 500 mg 3 times daily  Lithium 300 mg 3 times daily  Continue Zyprexa 10 mg twice daily  Abilify maintainer 400 mg IM every 30 days patient last received on 9/2/2022    PSYCHOTHERAPY/COUNSELING:  [x] Therapeutic interview  [x] Supportive  [] CBT  [] Ongoing  [] Other    [x] Patient continues to need, on a daily basis, active treatment furnished directly by or requiring the supervision of inpatient psychiatric personnel      Anticipated Length of stay: 3 to 7 days based on stability            Electronically signed by RALPH Smith CNP on 3/67/0024 at 3:03 PM

## 2022-09-28 NOTE — PLAN OF CARE
Problem: Anxiety  Goal: Will report anxiety at manageable levels  Description: INTERVENTIONS:  1. Administer medication as ordered  2. Teach and rehearse alternative coping skills  3. Provide emotional support with 1:1 interaction with staff  9/28/2022 0751 by Wilmer Toscano RN  Outcome: Progressing  Flowsheets (Taken 9/28/2022 0745)  Will report anxiety at manageable levels:   Provide emotional support with 1:1 interaction with staff   Administer medication as ordered   Teach and rehearse alternative coping skills  9/27/2022 2215 by Joey Singh RN  Outcome: Progressing     Problem: Coping  Goal: Pt/Family able to verbalize concerns and demonstrate effective coping strategies  Description: INTERVENTIONS:  1. Assist patient/family to identify coping skills, available support systems and cultural and spiritual values  2. Provide emotional support, including active listening and acknowledgement of concerns of patient and caregivers  3. Reduce environmental stimuli, as able  4. Instruct patient/family in relaxation techniques, as appropriate  5. Assess for spiritual pain/suffering and initiate Spiritual Care, Psychosocial Clinical Specialist consults as needed  9/28/2022 0751 by Wilmer Toscano RN  Outcome: Progressing  9/27/2022 2215 by Joey Singh RN  Outcome: Progressing     Problem: Behavior  Goal: Pt/Family maintain appropriate behavior and adhere to behavioral management agreement, if implemented  Description: INTERVENTIONS:  1. Assess patient/family's coping skills and  non-compliant behavior (including use of illegal substances)  2. Notify security of behavior or suspected illegal substances which indicate the need for search of the family and/or belongings  3. Encourage verbalization of thoughts and concerns in a socially appropriate manner  4. Utilize positive, consistent limit setting strategies supporting safety of patient, staff and others  5.  Encourage participation in the decision making process about the behavioral management agreement  6. If a visitor's behavior poses a threat to safety call refer to organization policy. 7. Initiate consult with , Psychosocial CNS, Spiritual Care as appropriate  Outcome: Progressing     Problem: Safety - Violent/Self-destructive Restraint  Goal: Remains free of injury from restraints (Restraint for Violent/Self-Destructive Behavior)  Description: INTERVENTIONS:  1. Determine that de-escalation and other, less restrictive measures have been tried or would not be effective before applying the restraint  2. Identify and document the criteria for restraint  3. Evaluate the patient's condition at the time of restraint application  4. Inform patient/family regarding the reason for restraint/seclusion  5. Q2H: Monitor comfort, nutrition and hydration needs  6. Q15M: Perform safety checks including skin, circulation, sensory, respiratory and psychological status  7. Ensure continuous observation  8. Identify and implement measures to help patient regain control, assess readiness for release and initiate progressive release per policy  Outcome: Progressing     0800 hours, Pt remains in locked seclusion for her safety and the safety of others, continues with homicidal ideation, threatening staff with violence, telling staff to come into the seclusion room so she can punch staff, difficult to redirect, no insight, constant supervision by staff. 1100 hours, Pt resting quietly in bed, taken out of locked seclusion, will do debriefing when pt awakens. 1110 hours, Staff completed debriefing with pt, pt states she no longer feels like hurting anyone or herself, denies hallucinations at this time. Pt drowsy but refusing to go to bed, sitting in hallway outside nurses station, oriented x 4, no somatic complaints noted, Q 15 minute checks for his/her safety and protection.      Pt is alert and oriented x 4, polite and cooperative rest of shift since coming out of locked seclusion. Pt following behavior plan, drowsy at times, attention seeking at times, needy, childlike, medication compliant, denies suicidal or homicidal ideation, contracts for safety, denies hallucinations, no somatic complaints noted, Q 15 minute checks for his/her safety and protection.

## 2022-09-28 NOTE — BH NOTE
Post Restraint and Seclusion Patient Debriefing    Did debriefing occur? yes, no longer wants to hurt anyone    If No, why not? [] Patient refused  [] Patient is unavailable for debriefing due to:  [] Patient debriefing not completed due to clinical contraindication of:    What events led to the seclusion/restraint incident? Agitated, threatening to harm others, fighting with police/staff      Did being restrained or in seclusion help you regain control of your behavior? yes, no longer wants to harm anyone      Did you feel safe while you were in restraint or seclusion:  yes    [x] Very Safe  [] Safe  [] Somewhat Safe  [] Not Safe     Did you have the chance to gain control of your behavior before you were secluded or restrained? yes, no longer want to harm anyone    If Yes, how:    [] Offered Medication  [x] Talked with  [] Given Time Out      [x] Offered alternatives to restraint/seclusion     During the restraint or seclusion process were you offered medicine to help you gain control? yes, no longer want to harm anyone      Were your physical and emotional needs met, and your privacy rights addressed while you were in restraints/seclusion? yes      How can we assist you in remaining restraint or seclusion free in the future? I don't know      Is there anything else you would like to share regarding this restraint/seclusion episode?no    Patient consented to family or significant other to participate in debriefing no    Patient's guardian participated in debriefing (when applicable) no    Patient's guardian unable to participate in debriefing (when applicable) no    Staff: Were modifications to the treatment plan completed?  yes

## 2022-09-28 NOTE — BH NOTE
Spoke with [place] RN, [facility] pt's PCP is Dr Cheryl Tubbs. Also [facility] pt received Abilify long acting injection 400 mg IM on 9/2/22, to receive q month.

## 2022-09-28 NOTE — PROGRESS NOTES
Patient sitting in roger fidgeting and stating \"I want to hurt someone. Im going to hurt one of you. The voices are telling me to do it today\". Attempted to redirect patient and offered prn Vistaril. Patient refused. Patient then went beyond nurses station, pointing at the windows stating \"Don't you see him? \". [employer] on unit for standby assist. Patient was then escorted to room. Benadryl 50mg IM, Haldol 5mg IM, and Ativan 2mg IM given per order. Patient then escorted to locked seclusion room. Order received from Dr. Christopher Fisher.

## 2022-09-28 NOTE — PROGRESS NOTES
BEHAVIORAL SERVICES: One - Hour In- Person Review  For Management of Violent or Self - Destructive Behavior    Seclusion/Restraint:  seclusion    Reason for Intervention: pt unable to remain in control of behavior    Response to Intervention:  pt continues to display violent behaviors    Medical Record reviewed and discussed precipitating events/behaviors with RN initiating Intervention:  Yes    Patient Medical Status: unable to assess  Vital Signs:   Respiratory Status:   Circulatory Status:   Skin Integrity:    Orientation: oriented to person, place, and day of week    Mood/Affect: labile    Speech: Soft    Thought Content: delusions    Thought Processes: Evasive    Rationale for continued use of intervention: pt continues to threaten staff    Rationale for discontinuing intervention: Patient is able to: remain in control of behavior     One Hour Review Evaluation Physician Notification:  yes

## 2022-09-29 PROCEDURE — 1240000000 HC EMOTIONAL WELLNESS R&B

## 2022-09-29 PROCEDURE — 6370000000 HC RX 637 (ALT 250 FOR IP): Performed by: NURSE PRACTITIONER

## 2022-09-29 PROCEDURE — 6360000002 HC RX W HCPCS: Performed by: NURSE PRACTITIONER

## 2022-09-29 PROCEDURE — 99232 SBSQ HOSP IP/OBS MODERATE 35: CPT | Performed by: NURSE PRACTITIONER

## 2022-09-29 RX ADMIN — DIVALPROEX SODIUM 500 MG: 500 TABLET, DELAYED RELEASE ORAL at 09:26

## 2022-09-29 RX ADMIN — MELATONIN 3 MG ORAL TABLET 3 MG: 3 TABLET ORAL at 21:03

## 2022-09-29 RX ADMIN — LORAZEPAM 2 MG: 2 INJECTION INTRAMUSCULAR; INTRAVENOUS at 18:51

## 2022-09-29 RX ADMIN — LITHIUM CARBONATE 300 MG: 300 CAPSULE ORAL at 12:50

## 2022-09-29 RX ADMIN — LEVETIRACETAM 1000 MG: 500 TABLET, FILM COATED ORAL at 21:03

## 2022-09-29 RX ADMIN — LEVETIRACETAM 1000 MG: 500 TABLET, FILM COATED ORAL at 09:26

## 2022-09-29 RX ADMIN — LITHIUM CARBONATE 300 MG: 300 CAPSULE ORAL at 09:26

## 2022-09-29 RX ADMIN — DIVALPROEX SODIUM 500 MG: 500 TABLET, DELAYED RELEASE ORAL at 12:50

## 2022-09-29 RX ADMIN — OLANZAPINE 10 MG: 5 TABLET, ORALLY DISINTEGRATING ORAL at 21:03

## 2022-09-29 RX ADMIN — DIPHENHYDRAMINE HYDROCHLORIDE 50 MG: 50 INJECTION, SOLUTION INTRAMUSCULAR; INTRAVENOUS at 18:51

## 2022-09-29 RX ADMIN — HALOPERIDOL LACTATE 5 MG: 5 INJECTION, SOLUTION INTRAMUSCULAR at 18:51

## 2022-09-29 RX ADMIN — OLANZAPINE 10 MG: 5 TABLET, ORALLY DISINTEGRATING ORAL at 09:25

## 2022-09-29 ASSESSMENT — PAIN SCALES - GENERAL
PAINLEVEL_OUTOF10: 0
PAINLEVEL_OUTOF10: 0

## 2022-09-29 NOTE — PLAN OF CARE
Problem: Anxiety  Goal: Will report anxiety at manageable levels  Description: INTERVENTIONS:  1. Administer medication as ordered  2. Teach and rehearse alternative coping skills  3. Provide emotional support with 1:1 interaction with staff  9/29/2022 1020 by Dorie Barajas RN  Outcome: Progressing  9/29/2022 1019 by Dorie Barajas RN  Outcome: Progressing  Flowsheets (Taken 9/29/2022 1009)  Will report anxiety at manageable levels:   Administer medication as ordered   Teach and rehearse alternative coping skills   Provide emotional support with 1:1 interaction with staff  9/29/2022 0328 by Tutu Mckeon RN  Outcome: Progressing     Problem: Coping  Goal: Pt/Family able to verbalize concerns and demonstrate effective coping strategies  Description: INTERVENTIONS:  1. Assist patient/family to identify coping skills, available support systems and cultural and spiritual values  2. Provide emotional support, including active listening and acknowledgement of concerns of patient and caregivers  3. Reduce environmental stimuli, as able  4. Instruct patient/family in relaxation techniques, as appropriate  5.  Assess for spiritual pain/suffering and initiate Spiritual Care, Psychosocial Clinical Specialist consults as needed  9/29/2022 1020 by Dorie Barajas RN  Outcome: Progressing  9/29/2022 1019 by Dorie Barajas RN  Outcome: Progressing  Flowsheets (Taken 9/29/2022 1009)  Patient/family able to verbalize anxieties, fears, and concerns, and demonstrate effective coping:   Assist patient/family to identify coping skills, available support systems and cultural and spiritual values   Provide emotional support, including active listening and acknowledgement of concerns of patient and caregivers   Reduce environmental stimuli, as able   Instruct patient/family in relaxation techniques, as appropriate   Assess for spiritual pain/suffering and initiate Spiritual Care, Psychosocial Clinical Specialist consults as needed  9/29/2022 0328 by Robbie Lyon RN  Outcome: Progressing     Problem: Behavior  Goal: Pt/Family maintain appropriate behavior and adhere to behavioral management agreement, if implemented  Description: INTERVENTIONS:  1. Assess patient/family's coping skills and  non-compliant behavior (including use of illegal substances)  2. Notify security of behavior or suspected illegal substances which indicate the need for search of the family and/or belongings  3. Encourage verbalization of thoughts and concerns in a socially appropriate manner  4. Utilize positive, consistent limit setting strategies supporting safety of patient, staff and others  5. Encourage participation in the decision making process about the behavioral management agreement  6. If a visitor's behavior poses a threat to safety call refer to organization policy. 7. Initiate consult with , Psychosocial CNS, Spiritual Care as appropriate  9/29/2022 1020 by Prince Young RN  Outcome: Progressing  9/29/2022 1019 by Prince Young RN  Outcome: Progressing  Flowsheets (Taken 9/29/2022 1009)  Patient/family maintains appropriate behavior and adheres to behavioral management agreement, if implemented:   Assess patient/familys coping skills and  non-compliant behavior (including use of illegal substances)   Encourage verbalization of thoughts and concerns in a socially appropriate manner   Notify security of behavior or suspected illegal substances which indicate the need for search of the patient and/or belongings   Utilize positive, consistent limit setting strategies supporting safety of patient, staff and others   Encourage participation in the decision making process about the behavioral management agreement   Implement a Health Care Agreement if patient meets criteria   If a patients behavior jeopardizes the safety of the patient, staff, or others refer to organization policy.  If a visitors behavior poses a threat to safety refer Progressing  9/29/2022 0328 by Bairon Vallecillo RN  Outcome: Progressing     Problem: Pain  Goal: Verbalizes/displays adequate comfort level or baseline comfort level  9/29/2022 1020 by Basil Soto RN  Outcome: Progressing  9/29/2022 0328 by Bairon Vallecillo RN  Outcome: Progressing

## 2022-09-29 NOTE — PROGRESS NOTES
BEHAVIORAL HEALTH FOLLOW-UP NOTE     9/29/2022     Patient was seen and examined in person, Chart reviewed   Patient's case discussed with staff/team    Chief Complaint: \"I still want to hurt myself \"a little bit. \"    Interim History:   Patient seen up on the unit she did not require seclusion this morning. She is been more cooperative. She denies SI/HI intent or plan she denies any auditory or visual hallucinations she states she still feeling suicidal \"a little bit. \"  Does not appear to be internally stimulated internally preoccupied her symptoms appear to be behavioral she requires attention from staff members on the unit.     Appetite:   [x] Normal/Unchanged  [] Increased  [] Decreased      Sleep:       [x] Normal/Unchanged  [] Fair       [] Poor              Energy:    [x] Normal/Unchanged  [] Increased  [] Decreased        SI [x] Present  [] Absent    HI  [x]Present  [] Absent     Aggression:  [x] yes  [] no    Patient is [] able  [x] unable to CONTRACT FOR SAFETY at the time my assessment patient indicated that she wanted to hurt others as well as her self she was in seclusion due to these threats  PAST MEDICAL/PSYCHIATRIC HISTORY:   Past Medical History:   Diagnosis Date    ADHD     Autism disorder     Colitis     Schizophrenia ([facility]     Seizures ([facility]        FAMILY/SOCIAL HISTORY:  Family History   Problem Relation Age of Onset    Diabetes Mother     Other Mother         lupus     Social History     Socioeconomic History    Marital status: Single     Spouse name: Not on file    Number of children: Not on file    Years of education: Not on file    Highest education level: Not on file   Occupational History    Not on file   Tobacco Use    Smoking status: Never    Smokeless tobacco: Never   Vaping Use    Vaping Use: Never used   Substance and Sexual Activity    Alcohol use: No    Drug use: No    Sexual activity: Not on file   Other Topics Concern    Not on file   Social History Narrative    Not on file Social Determinants of Health     Financial Resource Strain: Not on file   Food Insecurity: Not on file   Transportation Needs: Not on file   Physical Activity: Not on file   Stress: Not on file   Social Connections: Not on file   Intimate Partner Violence: Not on file   Housing Stability: Not on file           ROS:  [x] All negative/unchanged except if checked.  Explain positive(checked items) below:  [] Constitutional  [] Eyes  [] Ear/Nose/Mouth/Throat  [] Respiratory  [] CV  [] GI  []   [] Musculoskeletal  [] Skin/Breast  [] Neurological  [] Endocrine  [] Heme/Lymph  [] Allergic/Immunologic    Explanation:     MEDICATIONS:    Current Facility-Administered Medications:     [START ON 10/2/2022] ARIPiprazole ER (ABILIFY MAINTENA) 400 mg, 400 mg, IntraMUSCular, Q30 Days, Maggy B Albarolick, APRN - CNP    levETIRAcetam (KEPPRA) tablet 1,000 mg, 1,000 mg, Oral, BID, [place] Saxton Dellick, APRN - CNP, 2,730 mg at 09/29/22 2777    lithium capsule 300 mg, 300 mg, Oral, TID WC, Maggy B Dellick, APRN - CNP, 108 mg at 09/29/22 1250    diphenhydrAMINE (BENADRYL) injection 50 mg, 50 mg, IntraMUSCular, W9K PRN, Debby Saxton Dellick, APRN - CNP, 50 mg at 09/28/22 0717    LORazepam (ATIVAN) injection 2 mg, 2 mg, IntraMUSCular, L8P PRN, [place] Saxton Dellick, APRN - CNP, 2 mg at 09/28/22 0717    OLANZapine zydis (ZYPREXA) disintegrating tablet 10 mg, 10 mg, Oral, BID, Debby Saxton Dellick, APRN - CNP, 10 mg at 09/29/22 0925    divalproex (DEPAKOTE) DR tablet 500 mg, 500 mg, Oral, TID, Debby Saxton Dellick, APRN - CNP, 942 mg at 09/29/22 1250    acetaminophen (TYLENOL) tablet 650 mg, 650 mg, Oral, Z6Q PRN, [place] Saxton Dellick, APRN - CNP, 631 mg at 09/28/22 1633    magnesium hydroxide (MILK OF MAGNESIA) 400 MG/5ML suspension 30 mL, 30 mL, Oral, Daily PRN, RALPH Clark CNP    nicotine (NICODERM CQ) 21 MG/24HR 1 patch, 1 patch, TransDERmal, Daily, RALPH Clark - CNP    aluminum & magnesium hydroxide-simethicone (MAALOX) 852-824-24 MG/5ML suspension 30 mL, 30 mL, Oral, PRN, Primoselene Maldonadouse Dellick, APRN - CNP    hydrOXYzine pamoate (VISTARIL) capsule 50 mg, 50 mg, Oral, TID PRN, Primo Clause Dellick, APRN - CNP    haloperidol (HALDOL) tablet 5 mg, 5 mg, Oral, Q6H PRN **OR** haloperidol lactate (HALDOL) injection 5 mg, 5 mg, IntraMUSCular, X4M PRN, Primo Maldonadouse Dellick, APRN - CNP, 5 mg at 09/28/22 0717    melatonin tablet 3 mg, 3 mg, Oral, Nightly, Primo Joeluse Dellick, APRN - CNP, 3 mg at 09/28/22 2117      Examination:  /78   Pulse 89   Temp 97.1 °F (36.2 °C) (Oral)   Resp 15   Ht 5' 5\" (1.651 m)   Wt 240 lb (108.9 kg)   LMP 09/04/2022 (Approximate)   SpO2 97%   Breastfeeding Unknown   BMI 39.94 kg/m²   Gait - steady  Medication side effects(SE): Denies    Mental Status Examination:    Level of consciousness:  within normal limits   Appearance:  fair grooming and fair hygiene  Behavior/Motor: She has been aggressive  Attitude toward examiner:  cooperative  Speech:  spontaneous, normal rate and normal volume   Mood: \" I want to hurt myself and her other people. \"  Affect: Labile  Thought processes: Linear thought flight of ideas loose associations  Thought content: Reports auditory and visual hallucinations endorsed suicidal homicidal ideation   cognition:  oriented to person, place, and time   Concentration intact  Insight Limited  Judgement f limited    ASSESSMENT:   Patient symptoms are:  [] Well controlled  [x] Improving  [] Worsening  [] No change      Diagnosis:   Principal Problem:    Severe manic bipolar 1 disorder with psychotic behavior ([facility] [address]  Active Problems:    Intellectual disability    Borderline personality disorder ([facility] [address]  Resolved Problems:    * No resolved hospital problems.  *      LABS:    Recent Labs     09/26/22  1642   WBC 11.5   HGB 12.6        Recent Labs     09/26/22  1642      K 4.6   *   CO2 20*   BUN 14   CREATININE 1.3*   GLUCOSE 100*     Recent Labs     09/26/22  1642   BILITOT 0.2   ALKPHOS 82   AST 22   ALT 26     Lab Results

## 2022-09-29 NOTE — CARE COORDINATION
Smooth met with pt. Pt found sitting outside her room at a table, appears blunt, A&Ox4, shares fair eye contact. Pt reports that she is doing good today, that she is just having some issues with her allergies. Pt reports that she feels a bit better than she did before. When smooth asked pt about SI, pt states \"I don't want to talk about it\". Pt denied needing further support from sw at this time. Pt plans to discharge back to her group home. Smooth contacted Kyle Plaza at [facility] [phone] to inquire who pt's PCP is. Sarah informed smooth that pt sees Dr. Clay Morales at [address] [place] [address]. Smooth contacted the office of Dr. Clay Morales [phone]. No answer due to office being closed, unable to leave voicemail. Smooth will attempt again at later time to schedule pt appointments.

## 2022-09-29 NOTE — PROGRESS NOTES
Pt denies SI, HI and AVH. Pt verbalized bindu for safety if any suicidal or self harm thoughts presented. Pt stated \"I won't lie to you. \" Pt denies self harm currently. Pt is polite stating yes ma'am and no ma'am. Will continue to monitor.

## 2022-09-29 NOTE — BH NOTE
Patient reassessed at this time. Patient reports \"suicidal ideations just a little bit\" at this time. Patient denies any homicidal ideations. Patient reports \"hearing and seeing demons\"  at this time. Patient does not appear internally stimulated at this time. Patient does not appear in any distress at this time. Patient reports \"I'm tired and want to go lay down. \" Will continue to monitor patient every 15 minute rounds to ensure patient safety.

## 2022-09-30 PROCEDURE — 1240000000 HC EMOTIONAL WELLNESS R&B

## 2022-09-30 PROCEDURE — 6370000000 HC RX 637 (ALT 250 FOR IP): Performed by: NURSE PRACTITIONER

## 2022-09-30 RX ADMIN — LEVETIRACETAM 1000 MG: 500 TABLET, FILM COATED ORAL at 21:59

## 2022-09-30 RX ADMIN — OLANZAPINE 10 MG: 5 TABLET, ORALLY DISINTEGRATING ORAL at 09:39

## 2022-09-30 RX ADMIN — MELATONIN 3 MG ORAL TABLET 3 MG: 3 TABLET ORAL at 21:59

## 2022-09-30 RX ADMIN — DIVALPROEX SODIUM 500 MG: 500 TABLET, DELAYED RELEASE ORAL at 17:06

## 2022-09-30 RX ADMIN — HYDROXYZINE PAMOATE 50 MG: 50 CAPSULE ORAL at 21:59

## 2022-09-30 RX ADMIN — DIVALPROEX SODIUM 500 MG: 500 TABLET, DELAYED RELEASE ORAL at 12:57

## 2022-09-30 RX ADMIN — LITHIUM CARBONATE 300 MG: 300 CAPSULE ORAL at 09:39

## 2022-09-30 RX ADMIN — ALUMINUM HYDROXIDE, MAGNESIUM HYDROXIDE, AND SIMETHICONE 30 ML: 200; 200; 20 SUSPENSION ORAL at 18:02

## 2022-09-30 RX ADMIN — LITHIUM CARBONATE 300 MG: 300 CAPSULE ORAL at 17:06

## 2022-09-30 RX ADMIN — OLANZAPINE 10 MG: 5 TABLET, ORALLY DISINTEGRATING ORAL at 21:59

## 2022-09-30 RX ADMIN — HYDROXYZINE PAMOATE 50 MG: 50 CAPSULE ORAL at 09:39

## 2022-09-30 RX ADMIN — LITHIUM CARBONATE 300 MG: 300 CAPSULE ORAL at 12:57

## 2022-09-30 RX ADMIN — LEVETIRACETAM 1000 MG: 500 TABLET, FILM COATED ORAL at 09:39

## 2022-09-30 RX ADMIN — DIVALPROEX SODIUM 500 MG: 500 TABLET, DELAYED RELEASE ORAL at 09:39

## 2022-09-30 ASSESSMENT — ENCOUNTER SYMPTOMS
CHEST TIGHTNESS: 0
ABDOMINAL PAIN: 0
BLOOD IN STOOL: 0
NAUSEA: 0
COUGH: 0
CONSTIPATION: 0
VOMITING: 0
SHORTNESS OF BREATH: 0
DIARRHEA: 0
WHEEZING: 0
BACK PAIN: 0

## 2022-09-30 ASSESSMENT — PAIN SCALES - GENERAL
PAINLEVEL_OUTOF10: 0

## 2022-09-30 NOTE — CARE COORDINATION
Sw received a call from Macey Hoff at Dr. Sundar Dey office stating that they spoke with Conner Broussard at [facility] and they will manage scheduling for the pt. Dr. Seth Fontenot  Phone [phone]   Fax: [phone] jania Choudhury.

## 2022-09-30 NOTE — BH NOTE
Patient remains to be observation. With in site at all times. Patient eye contact good. Affect cooperative,brightened with conversation. Denies suicidal homicidal at this time. Verbalizes still hearing voices and seeing floating black balls. Denies anxiety or depression. Eating ordered meals. Compliant with medications. Q 15 minute rounds continue and constant in eye site.

## 2022-09-30 NOTE — CARE COORDINATION
Kim contacted the office of Dr. Garret Juarez [phone] to schedule appointment for pt. They report that they are unsure if they will need to schedule an appointment for pt or if she will be seen on Thursday when the doctor visits the group home to see pt's. They report that they will call sw back with information regarding pt's appointment.

## 2022-09-30 NOTE — PLAN OF CARE
Problem: Anxiety  Goal: Will report anxiety at manageable levels  Description: INTERVENTIONS:  1. Administer medication as ordered  2. Teach and rehearse alternative coping skills  3. Provide emotional support with 1:1 interaction with staff  9/30/2022 0936 by Lidia Up RN  Outcome: Not Progressing  Flowsheets (Taken 9/30/2022 0800)  Will report anxiety at manageable levels: Administer medication as ordered  9/30/2022 0453 by Rica Hernandez RN  Outcome: Progressing     Problem: Coping  Goal: Pt/Family able to verbalize concerns and demonstrate effective coping strategies  Description: INTERVENTIONS:  1. Assist patient/family to identify coping skills, available support systems and cultural and spiritual values  2. Provide emotional support, including active listening and acknowledgement of concerns of patient and caregivers  3. Reduce environmental stimuli, as able  4. Instruct patient/family in relaxation techniques, as appropriate  5. Assess for spiritual pain/suffering and initiate Spiritual Care, Psychosocial Clinical Specialist consults as needed  9/30/2022 0936 by Lidia Up RN  Outcome: Not Progressing  Flowsheets (Taken 9/30/2022 0800)  Patient/family able to verbalize anxieties, fears, and concerns, and demonstrate effective coping: Reduce environmental stimuli, as able  9/30/2022 0453 by Rica Hernandez RN  Outcome: Progressing    Patient is out of room sitting roger with in nurses site. Eye contact is fair. Affect blunt. Positive suicidal thoughts with plan to hang self, contracts for safety. \"I just want to grab someone ,no certain person and choke them. \"   Verbalizes command voices telling her to kill her self. Hears them all day long. States that this morning when waking up that she saw a black ball floating and sees all day long . Verbalizes wants pt. That is currently maniac is making her angry and anxious. Verbalizes anxiety 10 out of 10. States depression is fair.   Patient then observed sitting talking with another patient laughing and conversing back and forth. Appetite is very good. Compliant with medications. Sleeping good. Q 15 minute rounds continue.

## 2022-09-30 NOTE — ED PROVIDER NOTES
[address] [facility] ENCOUNTER      Pt Name: Bere Asif  MRN: [id number]  [place] 2000  Date of evaluation: 9/20/2022      CHIEF COMPLAINT       Chief Complaint   Patient presents with    Chest Pain     Diffuse mid-sternal/ upper chest pain, started this morning        HPI  Bere Asif is a 25 y.o. female  with PMHx of Bipolar disorder presents with suicidal ideations and chest pain. Describes the pain midsternal, stabbing, rated 6 out 10, worse with inspiration. Describes symptoms moderate in severity with no alleviating or exacerbating factors. Denies any fever, chills, n/v, headache, dizziness, vision changes, neck tenderness or stiffness, weakness,  palpitations, leg swelling/tenderness, sob, cough, abd pain, dysuria, hematuria, diarrhea, constipation. Except as noted above the remainder of the review of systems was reviewed and negative. Review of Systems   Constitutional:  Negative for activity change, appetite change, chills, fatigue and fever. HENT:  Negative for congestion, nosebleeds and tinnitus. Eyes:  Negative for visual disturbance. Respiratory:  Negative for cough, chest tightness, shortness of breath and wheezing. Cardiovascular:  Positive for chest pain. Negative for palpitations and leg swelling. Gastrointestinal:  Negative for abdominal pain, blood in stool, constipation, diarrhea, nausea and vomiting. Endocrine: Negative for polydipsia and polyphagia. Genitourinary:  Negative for dysuria, flank pain, hematuria, vaginal bleeding, vaginal discharge and vaginal pain. Musculoskeletal:  Negative for back pain, gait problem, joint swelling and myalgias. Skin:  Negative for rash. Allergic/Immunologic: Negative for immunocompromised state. Neurological:  Negative for dizziness, syncope, weakness, numbness and headaches. Hematological:  Negative for adenopathy. Psychiatric/Behavioral:  Positive for suicidal ideas. Negative for behavioral problems, confusion and hallucinations. Physical Exam  Constitutional:       General: She is not in acute distress. Appearance: Normal appearance. She is normal weight. She is not ill-appearing, toxic-appearing or diaphoretic. HENT:      Head: Normocephalic and atraumatic. Right Ear: External ear normal.      Left Ear: External ear normal.      Nose: Nose normal. No congestion or rhinorrhea. Mouth/Throat:      Mouth: Mucous membranes are moist.      Pharynx: Oropharynx is clear. No oropharyngeal exudate or posterior oropharyngeal erythema. Eyes:      Conjunctiva/sclera: Conjunctivae normal.      Pupils: Pupils are equal, round, and reactive to light. Cardiovascular:      Rate and Rhythm: Normal rate and regular rhythm. Pulses: Normal pulses. Heart sounds: Normal heart sounds. Pulmonary:      Effort: Pulmonary effort is normal.      Breath sounds: Normal breath sounds. Abdominal:      General: Abdomen is flat. Bowel sounds are normal. There is no distension. Palpations: Abdomen is soft. There is no mass. Tenderness: There is no abdominal tenderness. There is no right CVA tenderness, left CVA tenderness or guarding. Hernia: No hernia is present. Musculoskeletal:      Cervical back: Normal range of motion. Skin:     General: Skin is warm and dry. Capillary Refill: Capillary refill takes less than 2 seconds. Neurological:      General: No focal deficit present. Mental Status: She is alert and oriented to person, place, and time. Mental status is at baseline. Psychiatric:         Mood and Affect: Mood normal.         Behavior: Behavior normal.         Thought Content:  Thought content normal.        Procedures     MDM     Amount and/or Complexity of Data Reviewed  Decide to obtain previous medical records or to obtain history from someone other than the patient: yes 25 y.o. female  with PMHx of Bipolar disorder presents with suicidal ideations and chest pain. While in the ED patient was hemodynamically stable, afebrile, nontoxic-appearing, in no respiratory distress. Cardiac workup negative. EKG normal sinus with no sign of acute ischemia. CXR negative for any acute pathologies. Patient received fluids, zofran and toradol with significant symptomatic relief. SW consulted. Patient admitted to the Inpatient psych for further management. Patient in agreement with plan of admission. ED Course as of 09/30/22 1910   Tue Sep 20, 2022   1531 EKG: This EKG is signed by emergency department physician. Rate: 58  Qtc: 390ms  Rhythm: Sinus  Interpretation: non-specific EKG  Comparison: stable as compared to patient's most recent EKG      [TC]      ED Course User Index  [TC] Rosa Schofield MD       --------------------------------------------- PAST HISTORY ---------------------------------------------  Past Medical History:  has a past medical history of ADHD, Autism disorder, Colitis, Schizophrenia ([facility] [address] and Seizures ([facility] [address] Past Surgical History:  has a past surgical history that includes Breast surgery (Right) and laparoscopy (Left, 8/9/2019). Social History:  reports that she has never smoked. She has never used smokeless tobacco. She reports that she does not drink alcohol and does not use drugs. Family History: family history includes Diabetes in her mother; Other in her mother. The patients home medications have been reviewed.     Allergies: Dye [barium-containing compounds], Sulfa antibiotics, and Versed [midazolam]    -------------------------------------------------- RESULTS -------------------------------------------------    LABS:  Results for orders placed or performed during the hospital encounter of 09/20/22   CBC with Auto Differential   Result Value Ref Range    WBC 10.3 4.5 - 11.5 E9/L    RBC 4.70 3.50 - 5.50 E12/L    Hemoglobin 11.9 11.5 - 15.5 g/dL    Hematocrit 39.8 34.0 - 48.0 %    MCV 84.7 80.0 - 99.9 fL    MCH 25.3 (L) 26.0 - 35.0 pg    MCHC 29.9 (L) 32.0 - 34.5 %    RDW 15.2 (H) 11.5 - 15.0 fL    Platelets 818 039 - 451 E9/L    MPV 9.3 7.0 - 12.0 fL    Neutrophils % 58.1 43.0 - 80.0 %    Immature Granulocytes % 0.9 0.0 - 5.0 %    Lymphocytes % 24.8 20.0 - 42.0 %    Monocytes % 12.1 (H) 2.0 - 12.0 %    Eosinophils % 3.6 0.0 - 6.0 %    Basophils % 0.5 0.0 - 2.0 %    Neutrophils Absolute 5.98 1.80 - 7.30 E9/L    Immature Granulocytes # 0.09 E9/L    Lymphocytes Absolute 2.55 1.50 - 4.00 E9/L    Monocytes Absolute 1.24 (H) 0.10 - 0.95 E9/L    Eosinophils Absolute 0.37 0.05 - 0.50 E9/L    Basophils Absolute 0.05 0.00 - 0.20 E9/L   Comprehensive Metabolic Panel w/ Reflex to MG   Result Value Ref Range    Sodium 134 132 - 146 mmol/L    Potassium reflex Magnesium 5.0 3.5 - 5.0 mmol/L    Chloride 106 98 - 107 mmol/L    CO2 17 (L) 22 - 29 mmol/L    Anion Gap 11 7 - 16 mmol/L    Glucose 95 74 - 99 mg/dL    BUN 7 6 - 20 mg/dL    Creatinine 0.9 0.5 - 1.0 mg/dL    GFR Non-African American >60 >=60 mL/min/1.73    GFR African American >60     Calcium 9.3 8.6 - 10.2 mg/dL    Total Protein 7.6 6.4 - 8.3 g/dL    Albumin 3.8 3.5 - 5.2 g/dL    Total Bilirubin 0.3 0.0 - 1.2 mg/dL    Alkaline Phosphatase 67 35 - 104 U/L    ALT 20 0 - 32 U/L    AST 28 0 - 31 U/L   Brain Natriuretic Peptide   Result Value Ref Range    Pro-BNP 42 0 - 125 pg/mL   Urinalysis with Microscopic   Result Value Ref Range    Color, UA Yellow Straw/Yellow    Clarity, UA Clear Clear    Glucose, Ur Negative Negative mg/dL    Bilirubin Urine Negative Negative    Ketones, Urine Negative Negative mg/dL    Specific Gravity, UA <=1.005 1.005 - 1.030    Blood, Urine Negative Negative    pH, UA 6.5 5.0 - 9.0    Protein, UA Negative Negative mg/dL    Urobilinogen, Urine 0.2 <2.0 E.U./dL    Nitrite, Urine Negative Negative    Leukocyte Esterase, Urine Negative Negative    WBC, UA NONE 0 - 5 /HPF    RBC, UA NONE 0 - 2 /HPF    Epithelial Cells, UA RARE /HPF    Bacteria, UA RARE (A) None Seen /HPF   Lipase   Result Value Ref Range    Lipase 24 13 - 60 U/L   Lactic Acid   Result Value Ref Range    Lactic Acid 1.2 0.5 - 2.2 mmol/L   Troponin   Result Value Ref Range    Troponin, High Sensitivity 7 0 - 9 ng/L   SPECIMEN REJECTION   Result Value Ref Range    Rejected Test trp5     Reason for Rejection see below    EKG 12 Lead   Result Value Ref Range    Ventricular Rate 58 BPM    Atrial Rate 58 BPM    P-R Interval 146 ms    QRS Duration 74 ms    Q-T Interval 398 ms    QTc Calculation (Bazett) 390 ms    P Axis 40 degrees    R Axis 47 degrees    T Axis -6 degrees       RADIOLOGY:  XR CHEST (2 VW)   Final Result   No acute process. ------------------------- NURSING NOTES AND VITALS REVIEWED ---------------------------  Date / Time Roomed:  9/20/2022 11:34 AM  ED Bed Assignment:  Ceferino Mirza    The nursing notes within the ED encounter and vital signs as below have been reviewed. No data found. Oxygen Saturation Interpretation: Normal    ------------------------------------------ PROGRESS NOTES ------------------------------------------  Re-evaluation(s):  Time: 9515  Patients symptoms show no change  Repeat physical examination is not changed    Counseling:  I have spoken with the patient and discussed todays results, in addition to providing specific details for the plan of care and counseling regarding the diagnosis and prognosis. Their questions are answered at this time and they are agreeable with the plan of admission.    --------------------------------- ADDITIONAL PROVIDER NOTES ---------------------------------  Consultations:  Spoke with SW Discussed case. They will admit the patient.   This patient's ED course included: a personal history and physicial examination, re-evaluation prior to disposition, multiple bedside re-evaluations, IV medications, cardiac monitoring, and continuous pulse oximetry    This patient has remained hemodynamically stable during their ED course. Diagnosis:  1. Atypical chest pain        Disposition:  Patient's disposition: Admit to mental health unit - medically cleared for admission  Patient's condition is stable.          Gurvinder Manzanares MD  Resident  09/30/22 7540

## 2022-09-30 NOTE — PROGRESS NOTES
Pt standing in her room staring out at the unit. This nurse asked patient if she needed any help. Pt stated to this nurse \"I'm going to murder somebody. \" This nurse discouraged the action. Pt stated the demons were making her do it. Pt stated she did not have a victim in mind. Pt stated she wanted to fight this nurse. Educated the pt on my bad knee and being a poor opponent and pt laughed. This nurse along with Maliha Linda RN distracted pt. Pt still focused on demons. Pt visibily shaking her left hand saying its burning. Wanting medication. PRNs given. Pt denies SI. Will continue to monitor.

## 2022-10-01 ENCOUNTER — APPOINTMENT (OUTPATIENT)
Dept: CT IMAGING | Age: 22
End: 2022-10-01
Payer: COMMERCIAL

## 2022-10-01 ENCOUNTER — HOSPITAL ENCOUNTER (EMERGENCY)
Age: 22
Discharge: HOME | End: 2022-10-01
Attending: EMERGENCY MEDICINE
Payer: COMMERCIAL

## 2022-10-01 ENCOUNTER — APPOINTMENT (OUTPATIENT)
Dept: GENERAL RADIOLOGY | Age: 22
End: 2022-10-01
Payer: COMMERCIAL

## 2022-10-01 VITALS
RESPIRATION RATE: 23 BRPM | SYSTOLIC BLOOD PRESSURE: 139 MMHG | DIASTOLIC BLOOD PRESSURE: 82 MMHG | OXYGEN SATURATION: 99 % | TEMPERATURE: 97.8 F | HEART RATE: 104 BPM

## 2022-10-01 VITALS
TEMPERATURE: 98.1 F | RESPIRATION RATE: 15 BRPM | SYSTOLIC BLOOD PRESSURE: 138 MMHG | DIASTOLIC BLOOD PRESSURE: 79 MMHG | BODY MASS INDEX: 39.99 KG/M2 | OXYGEN SATURATION: 97 % | HEART RATE: 99 BPM | WEIGHT: 240 LBS | HEIGHT: 65 IN

## 2022-10-01 DIAGNOSIS — R07.9 CHEST PAIN, UNSPECIFIED TYPE: Primary | ICD-10-CM

## 2022-10-01 LAB
ALBUMIN SERPL-MCNC: 3.9 G/DL (ref 3.5–5.2)
ALP BLD-CCNC: 87 U/L (ref 35–104)
ALT SERPL-CCNC: 30 U/L (ref 0–32)
ANION GAP SERPL CALCULATED.3IONS-SCNC: 12 MMOL/L (ref 7–16)
AST SERPL-CCNC: 17 U/L (ref 0–31)
BACTERIA: ABNORMAL /HPF
BASOPHILS ABSOLUTE: 0.05 E9/L (ref 0–0.2)
BASOPHILS RELATIVE PERCENT: 0.5 % (ref 0–2)
BILIRUB SERPL-MCNC: <0.2 MG/DL (ref 0–1.2)
BILIRUBIN URINE: NEGATIVE
BLOOD, URINE: ABNORMAL
BUN BLDV-MCNC: 6 MG/DL (ref 6–20)
CALCIUM SERPL-MCNC: 9.5 MG/DL (ref 8.6–10.2)
CHLORIDE BLD-SCNC: 108 MMOL/L (ref 98–107)
CLARITY: CLEAR
CO2: 22 MMOL/L (ref 22–29)
COLOR: YELLOW
CREAT SERPL-MCNC: 0.9 MG/DL (ref 0.5–1)
D DIMER: 256 NG/ML DDU
EOSINOPHILS ABSOLUTE: 0.5 E9/L (ref 0.05–0.5)
EOSINOPHILS RELATIVE PERCENT: 5.4 % (ref 0–6)
GFR AFRICAN AMERICAN: >60
GFR NON-AFRICAN AMERICAN: >60 ML/MIN/1.73
GLUCOSE BLD-MCNC: 107 MG/DL (ref 74–99)
GLUCOSE URINE: NEGATIVE MG/DL
HCG(URINE) PREGNANCY TEST: NEGATIVE
HCT VFR BLD CALC: 37.7 % (ref 34–48)
HEMOGLOBIN: 11.7 G/DL (ref 11.5–15.5)
IMMATURE GRANULOCYTES #: 0.2 E9/L
IMMATURE GRANULOCYTES %: 2.1 % (ref 0–5)
KETONES, URINE: NEGATIVE MG/DL
LEUKOCYTE ESTERASE, URINE: NEGATIVE
LYMPHOCYTES ABSOLUTE: 2.94 E9/L (ref 1.5–4)
LYMPHOCYTES RELATIVE PERCENT: 31.5 % (ref 20–42)
MCH RBC QN AUTO: 26.2 PG (ref 26–35)
MCHC RBC AUTO-ENTMCNC: 31 % (ref 32–34.5)
MCV RBC AUTO: 84.5 FL (ref 80–99.9)
MONOCYTES ABSOLUTE: 1.04 E9/L (ref 0.1–0.95)
MONOCYTES RELATIVE PERCENT: 11.1 % (ref 2–12)
NEUTROPHILS ABSOLUTE: 4.6 E9/L (ref 1.8–7.3)
NEUTROPHILS RELATIVE PERCENT: 49.4 % (ref 43–80)
NITRITE, URINE: NEGATIVE
PDW BLD-RTO: 14.6 FL (ref 11.5–15)
PH UA: 7 (ref 5–9)
PLATELET # BLD: 312 E9/L (ref 130–450)
PMV BLD AUTO: 9.3 FL (ref 7–12)
POTASSIUM REFLEX MAGNESIUM: 4.1 MMOL/L (ref 3.5–5)
PRO-BNP: 18 PG/ML (ref 0–125)
PROTEIN UA: NEGATIVE MG/DL
RBC # BLD: 4.46 E12/L (ref 3.5–5.5)
RBC UA: ABNORMAL /HPF (ref 0–2)
SARS-COV-2, NAAT: NOT DETECTED
SODIUM BLD-SCNC: 142 MMOL/L (ref 132–146)
SPECIFIC GRAVITY UA: <=1.005 (ref 1–1.03)
TOTAL PROTEIN: 7.4 G/DL (ref 6.4–8.3)
TROPONIN, HIGH SENSITIVITY: 13 NG/L (ref 0–9)
TROPONIN, HIGH SENSITIVITY: 13 NG/L (ref 0–9)
UROBILINOGEN, URINE: 0.2 E.U./DL
WBC # BLD: 9.3 E9/L (ref 4.5–11.5)
WBC UA: ABNORMAL /HPF (ref 0–5)

## 2022-10-01 PROCEDURE — 85378 FIBRIN DEGRADE SEMIQUANT: CPT

## 2022-10-01 PROCEDURE — 6360000004 HC RX CONTRAST MEDICATION: Performed by: RADIOLOGY

## 2022-10-01 PROCEDURE — 80053 COMPREHEN METABOLIC PANEL: CPT

## 2022-10-01 PROCEDURE — 99285 EMERGENCY DEPT VISIT HI MDM: CPT

## 2022-10-01 PROCEDURE — 83880 ASSAY OF NATRIURETIC PEPTIDE: CPT

## 2022-10-01 PROCEDURE — 85025 COMPLETE CBC W/AUTO DIFF WBC: CPT

## 2022-10-01 PROCEDURE — 93005 ELECTROCARDIOGRAM TRACING: CPT | Performed by: STUDENT IN AN ORGANIZED HEALTH CARE EDUCATION/TRAINING PROGRAM

## 2022-10-01 PROCEDURE — 84484 ASSAY OF TROPONIN QUANT: CPT

## 2022-10-01 PROCEDURE — 2500000003 HC RX 250 WO HCPCS: Performed by: NURSE PRACTITIONER

## 2022-10-01 PROCEDURE — 96361 HYDRATE IV INFUSION ADD-ON: CPT

## 2022-10-01 PROCEDURE — 87635 SARS-COV-2 COVID-19 AMP PRB: CPT

## 2022-10-01 PROCEDURE — 81025 URINE PREGNANCY TEST: CPT

## 2022-10-01 PROCEDURE — 6360000002 HC RX W HCPCS: Performed by: STUDENT IN AN ORGANIZED HEALTH CARE EDUCATION/TRAINING PROGRAM

## 2022-10-01 PROCEDURE — 81001 URINALYSIS AUTO W/SCOPE: CPT

## 2022-10-01 PROCEDURE — 71275 CT ANGIOGRAPHY CHEST: CPT

## 2022-10-01 PROCEDURE — 6370000000 HC RX 637 (ALT 250 FOR IP): Performed by: NURSE PRACTITIONER

## 2022-10-01 PROCEDURE — 71045 X-RAY EXAM CHEST 1 VIEW: CPT

## 2022-10-01 PROCEDURE — 96374 THER/PROPH/DIAG INJ IV PUSH: CPT

## 2022-10-01 PROCEDURE — 2580000003 HC RX 258: Performed by: STUDENT IN AN ORGANIZED HEALTH CARE EDUCATION/TRAINING PROGRAM

## 2022-10-01 PROCEDURE — 99239 HOSP IP/OBS DSCHRG MGMT >30: CPT | Performed by: NURSE PRACTITIONER

## 2022-10-01 RX ORDER — 0.9 % SODIUM CHLORIDE 0.9 %
1000 INTRAVENOUS SOLUTION INTRAVENOUS ONCE
Status: COMPLETED | OUTPATIENT
Start: 2022-10-01 | End: 2022-10-01

## 2022-10-01 RX ORDER — IBUPROFEN 800 MG/1
800 TABLET ORAL EVERY 8 HOURS PRN
Qty: 30 TABLET | Refills: 0 | Status: SHIPPED | OUTPATIENT
Start: 2022-10-01

## 2022-10-01 RX ORDER — LITHIUM CARBONATE 300 MG/1
300 CAPSULE ORAL
Qty: 90 CAPSULE | Refills: 0 | Status: SHIPPED | OUTPATIENT
Start: 2022-10-01 | End: 2022-10-31

## 2022-10-01 RX ORDER — LANOLIN ALCOHOL/MO/W.PET/CERES
3 CREAM (GRAM) TOPICAL NIGHTLY
Qty: 30 TABLET | Refills: 0 | Status: SHIPPED | OUTPATIENT
Start: 2022-10-01 | End: 2022-10-31

## 2022-10-01 RX ORDER — LEVETIRACETAM 1000 MG/1
1000 TABLET ORAL 2 TIMES DAILY
Qty: 60 TABLET | Refills: 3 | Status: SHIPPED | OUTPATIENT
Start: 2022-10-01 | End: 2022-10-31

## 2022-10-01 RX ORDER — DIVALPROEX SODIUM 500 MG/1
500 TABLET, DELAYED RELEASE ORAL 3 TIMES DAILY
Qty: 90 TABLET | Refills: 0 | Status: SHIPPED | OUTPATIENT
Start: 2022-10-01 | End: 2022-10-31

## 2022-10-01 RX ORDER — OLANZAPINE 10 MG/1
10 TABLET, ORALLY DISINTEGRATING ORAL 2 TIMES DAILY
Qty: 60 TABLET | Refills: 0 | Status: SHIPPED | OUTPATIENT
Start: 2022-10-01 | End: 2022-10-31

## 2022-10-01 RX ORDER — KETOROLAC TROMETHAMINE 30 MG/ML
15 INJECTION, SOLUTION INTRAMUSCULAR; INTRAVENOUS ONCE
Status: COMPLETED | OUTPATIENT
Start: 2022-10-01 | End: 2022-10-01

## 2022-10-01 RX ADMIN — ANTI-FUNGAL POWDER MICONAZOLE NITRATE TALC FREE: 1.42 POWDER TOPICAL at 10:38

## 2022-10-01 RX ADMIN — LEVETIRACETAM 1000 MG: 500 TABLET, FILM COATED ORAL at 09:12

## 2022-10-01 RX ADMIN — DIVALPROEX SODIUM 500 MG: 500 TABLET, DELAYED RELEASE ORAL at 09:12

## 2022-10-01 RX ADMIN — LITHIUM CARBONATE 300 MG: 300 CAPSULE ORAL at 09:12

## 2022-10-01 RX ADMIN — LITHIUM CARBONATE 300 MG: 300 CAPSULE ORAL at 11:56

## 2022-10-01 RX ADMIN — OLANZAPINE 10 MG: 5 TABLET, ORALLY DISINTEGRATING ORAL at 09:12

## 2022-10-01 RX ADMIN — KETOROLAC TROMETHAMINE 15 MG: 30 INJECTION, SOLUTION INTRAMUSCULAR at 22:14

## 2022-10-01 RX ADMIN — DIVALPROEX SODIUM 500 MG: 500 TABLET, DELAYED RELEASE ORAL at 11:56

## 2022-10-01 RX ADMIN — IOPAMIDOL 90 ML: 755 INJECTION, SOLUTION INTRAVENOUS at 21:52

## 2022-10-01 RX ADMIN — SODIUM CHLORIDE 1000 ML: 9 INJECTION, SOLUTION INTRAVENOUS at 22:13

## 2022-10-01 ASSESSMENT — ENCOUNTER SYMPTOMS
NAUSEA: 0
COLOR CHANGE: 0
SHORTNESS OF BREATH: 0
ABDOMINAL PAIN: 0
BACK PAIN: 0
COUGH: 0
DIARRHEA: 0
VOMITING: 0

## 2022-10-01 ASSESSMENT — PAIN DESCRIPTION - LOCATION: LOCATION: CHEST

## 2022-10-01 ASSESSMENT — PAIN SCALES - GENERAL
PAINLEVEL_OUTOF10: 0
PAINLEVEL_OUTOF10: 0

## 2022-10-01 NOTE — PROGRESS NOTES
Pt denies SI, HI and AVH. Pt denies self harm. Pt stated she was feeling better. Pt brightens with conversation. Pt able to make needs known. Pt is polite stating \"yes ma'am\" and \"no ma'am.\" Will continue to monitor.

## 2022-10-01 NOTE — PROGRESS NOTES
Pt breast folds excoriated and pt was helped to wash debris from folds, dry thoroughly and wipe area with Phytoplex Barrier Cream Cloth. Pt encouraged to keep area clean and dry.

## 2022-10-01 NOTE — PLAN OF CARE
Problem: Anxiety  Goal: Will report anxiety at manageable levels  Description: INTERVENTIONS:  1. Administer medication as ordered  2. Teach and rehearse alternative coping skills  3. Provide emotional support with 1:1 interaction with staff  10/1/2022 1148 by Sandi Bliss RN  Outcome: Completed  10/1/2022 1144 by Sandi Bliss RN  Outcome: Progressing  Flowsheets (Taken 10/1/2022 0800)  Will report anxiety at manageable levels: Administer medication as ordered  10/1/2022 0454 by Brian Hi RN  Outcome: Progressing     Problem: Coping  Goal: Pt/Family able to verbalize concerns and demonstrate effective coping strategies  Description: INTERVENTIONS:  1. Assist patient/family to identify coping skills, available support systems and cultural and spiritual values  2. Provide emotional support, including active listening and acknowledgement of concerns of patient and caregivers  3. Reduce environmental stimuli, as able  4. Instruct patient/family in relaxation techniques, as appropriate  5. Assess for spiritual pain/suffering and initiate Spiritual Care, Psychosocial Clinical Specialist consults as needed  10/1/2022 1148 by Sandi Bliss RN  Outcome: Completed  10/1/2022 1144 by Sandi Bliss RN  Outcome: Progressing  Flowsheets (Taken 10/1/2022 0800)  Patient/family able to verbalize anxieties, fears, and concerns, and demonstrate effective coping: Provide emotional support, including active listening and acknowledgement of concerns of patient and caregivers  10/1/2022 0454 by Brian Hi RN  Outcome: Progressing     Problem: Behavior  Goal: Pt/Family maintain appropriate behavior and adhere to behavioral management agreement, if implemented  Description: INTERVENTIONS:  1. Assess patient/family's coping skills and  non-compliant behavior (including use of illegal substances)  2.  Notify security of behavior or suspected illegal substances which indicate the need for search of the family and/or belongings  3. Encourage verbalization of thoughts and concerns in a socially appropriate manner  4. Utilize positive, consistent limit setting strategies supporting safety of patient, staff and others  5. Encourage participation in the decision making process about the behavioral management agreement  6. If a visitor's behavior poses a threat to safety call refer to organization policy. 7. Initiate consult with , Psychosocial CNS, Spiritual Care as appropriate  10/1/2022 1148 by James Copeland RN  Outcome: Completed  10/1/2022 1144 by James Copeland RN  Outcome: Progressing  Flowsheets (Taken 10/1/2022 0800)  Patient/family maintains appropriate behavior and adheres to behavioral management agreement, if implemented: Encourage participation in the decision making process about the behavioral management agreement  10/1/2022 0454 by Cece Diaz RN  Outcome: Progressing     Problem: Involuntary Admit  Goal: Will cooperate with staff recommendations and doctor's orders and will demonstrate appropriate behavior  Description: INTERVENTIONS:  1. Treat underlying conditions and offer medication as ordered  2. Educate regarding involuntary admission procedures and rules  3. Contain excessive/inappropriate behavior per unit and hospital policies  [id number] by James Copeland RN  Outcome: Completed  10/1/2022 1144 by James Copeland RN  Outcome: Progressing  Flowsheets (Taken 10/1/2022 0800)  Will cooperate with staff recommendations and doctor's orders and will demonstrate appropriate behavior: Treat underlying conditions and offer medication as ordered  10/1/2022 0454 by Cece Diaz RN  Outcome: Progressing     Problem: Safety - Violent/Self-destructive Restraint  Goal: Remains free of injury from restraints (Restraint for Violent/Self-Destructive Behavior)  Description: INTERVENTIONS:  1.  Determine that de-escalation and other, less restrictive measures have been tried or would not be effective before applying the restraint  2. Identify and document the criteria for restraint  3. Evaluate the patient's condition at the time of restraint application  4. Inform patient/family regarding the reason for restraint/seclusion  5. Q2H: Monitor comfort, nutrition and hydration needs  6. Q15M: Perform safety checks including skin, circulation, sensory, respiratory and psychological status  7. Ensure continuous observation  8.  Identify and implement measures to help patient regain control, assess readiness for release and initiate progressive release per policy  [id number]/1/0193 [id number] by Carey Cid RN  Outcome: Completed  10/1/2022 1144 by Carey Cid RN  Outcome: Progressing  10/1/2022 0454 by Terrell Edge RN  Outcome: Progressing     Problem: Pain  Goal: Verbalizes/displays adequate comfort level or baseline comfort level  10/1/2022 1148 by Carey Cid RN  Outcome: Completed  10/1/2022 1144 by Carey Cid RN  Outcome: Progressing  10/1/2022 0454 by Terrell Edge RN  Outcome: Progressing

## 2022-10-01 NOTE — CARE COORDINATION
JACOB contacted [place] [id number] with [facility]. JACOB advised her that doctor is looking at discharging pt back today. Marco A Terrazas asked if pt has been making any threats, advised her she has been denying SI/HI. Marco A Terrazas reports pt medications will need to be faxed to [phone]. Marco A Terrazas advised SW to call the  [facility]  to discuss a  time. Marco A Terrazas requested to talk with the RN about pt medications, call was transferred to the nurses station. JACOB contacted [facility] and she reported they can pick her up at 1:00 pm today.  Angeles Currie is aware that the  will need to go to the main entrance and call the nurses station upon arrival.     JACOB informed RN and NP.

## 2022-10-01 NOTE — CARE COORDINATION
JACOB met with pt to discuss her discharge. Pt reports feeling good today, denied SI/HI/AVH. Pt will return to [facility] and they will provide transportation. Pt treats with her PCP for mental health medication management. Spoke with pt group home who reports pt can return at time of discharge. Pt has good support from her group home, pt has been compliant with medications, pt has SSI and steady income, pt has access to safe and stable housing, pt has access to basic needs, pt reported that she is working. pt cooperative, bright, calm, pleasant, with good eye contact, clear speech, and improved insight/judgement.      In order to ensure appropriate transition and discharge planning is in place, the following documents have been transmitted to [facility] and [facility], as the new outpatient provider:    The d/c diagnosis was transmitted to the next care provider  The reason for hospitalization was transmitted to the next care provider  The d/c medications (dosage and indication) were transmitted to the next care provider   The continuing care plan was transmitted to the next care provider

## 2022-10-01 NOTE — PROGRESS NOTES
BEHAVIORAL HEALTH FOLLOW-UP NOTE     10/1/2022     Patient was seen and examined in person, Chart reviewed   Patient's case discussed with staff/team    Chief Complaint: No speaking to me    Interim History:   Patient seen up on the unit she did not require seclusion this morning. She is been more cooperative. She denies SI/HI intent or plan she denies any auditory or visual hallucinations. Does not appear to be internally stimulated internally preoccupied her symptoms appear to be behavioral she requires attention from staff members on the unit. She has an excoriated area under her breasts that she is complaining about.  Will get her some Miconazole powder     Appetite:   [x] Normal/Unchanged  [] Increased  [] Decreased      Sleep:       [x] Normal/Unchanged  [] Fair       [] Poor              Energy:    [x] Normal/Unchanged  [] Increased  [] Decreased        SI [x] Present  [] Absent    HI  [x]Present  [] Absent     Aggression:  [x] yes  [] no    Patient is [] able  [x] unable to CONTRACT FOR SAFETY at the time my assessment patient indicated that she wanted to hurt others as well as her self she was in seclusion due to these threats  PAST MEDICAL/PSYCHIATRIC HISTORY:   Past Medical History:   Diagnosis Date    ADHD     Autism disorder     Colitis     Schizophrenia ([facility] [address]     Seizures ([facility] [address]        FAMILY/SOCIAL HISTORY:  Family History   Problem Relation Age of Onset    Diabetes Mother     Other Mother         lupus     Social History     Socioeconomic History    Marital status: Single     Spouse name: Not on file    Number of children: Not on file    Years of education: Not on file    Highest education level: Not on file   Occupational History    Not on file   Tobacco Use    Smoking status: Never    Smokeless tobacco: Never   Vaping Use    Vaping Use: Never used   Substance and Sexual Activity    Alcohol use: No    Drug use: No    Sexual activity: Not on file   Other Topics Concern    Not on file   Social History Narrative    Not on file     Social Determinants of Health     Financial Resource Strain: Not on file   Food Insecurity: Not on file   Transportation Needs: Not on file   Physical Activity: Not on file   Stress: Not on file   Social Connections: Not on file   Intimate Partner Violence: Not on file   Housing Stability: Not on file           ROS:  [x] All negative/unchanged except if checked.  Explain positive(checked items) below:  [] Constitutional  [] Eyes  [] Ear/Nose/Mouth/Throat  [] Respiratory  [] CV  [] GI  []   [] Musculoskeletal  [] Skin/Breast  [] Neurological  [] Endocrine  [] Heme/Lymph  [] Allergic/Immunologic    Explanation:     MEDICATIONS:    Current Facility-Administered Medications:     [START ON 10/2/2022] ARIPiprazole ER (ABILIFY MAINTENA) 400 mg, 400 mg, IntraMUSCular, Q30 Days, Maggy B Rosk, APRN - CNP    levETIRAcetam (KEPPRA) tablet 1,000 mg, 1,000 mg, Oral, BID, Anamika Lopezon William, APRN - CNP, 1,743 mg at 10/01/22 9510    lithium capsule 300 mg, 300 mg, Oral, TID WC, Maggy B Dellick, APRN - CNP, 135 mg at 10/01/22 0912    diphenhydrAMINE (BENADRYL) injection 50 mg, 50 mg, IntraMUSCular, E8D PRN, Anamika Petronila Dellick, APRN - CNP, 50 mg at 09/29/22 1851    LORazepam (ATIVAN) injection 2 mg, 2 mg, IntraMUSCular, S4G PRN, Anamika Petronila Albarolick, APRN - CNP, 2 mg at 09/29/22 1851    OLANZapine zydis (ZYPREXA) disintegrating tablet 10 mg, 10 mg, Oral, BID, Anamika Debbi Dellick, APRN - CNP, 10 mg at 10/01/22 0912    divalproex (DEPAKOTE) DR tablet 500 mg, 500 mg, Oral, TID, Anamika Petronila Dellick, APRN - CNP, 323 mg at 10/01/22 7162    acetaminophen (TYLENOL) tablet 650 mg, 650 mg, Oral, B5U PRN, Anamika Petronila Dellick, APRN - CNP, 743 mg at 09/28/22 1633    magnesium hydroxide (MILK OF MAGNESIA) 400 MG/5ML suspension 30 mL, 30 mL, Oral, Daily PRN, RALPH Mcdonald CNP    nicotine (NICODERM CQ) 21 MG/24HR 1 patch, 1 patch, TransDERmal, Daily, RALPH Mcdonald CNP    aluminum & magnesium hydroxide-simethicone (MAALOX) 079-426-29 MG/5ML suspension 30 mL, 30 mL, Oral, PRN, Katya Thomas, APRN - CNP, 30 mL at 09/30/22 1802    hydrOXYzine pamoate (VISTARIL) capsule 50 mg, 50 mg, Oral, TID PRN, [place] Eckerman, APRN - CNP, 50 mg at 09/30/22 2159    haloperidol (HALDOL) tablet 5 mg, 5 mg, Oral, Q6H PRN **OR** haloperidol lactate (HALDOL) injection 5 mg, 5 mg, IntraMUSCular, F5G PRN, Katya Irvinlick, APRN - CNP, 5 mg at 09/29/22 1851    melatonin tablet 3 mg, 3 mg, Oral, Nightly, Katya Barrios Dellick, APRN - CNP, 3 mg at 09/30/22 2159      Examination:  /79   Pulse 99   Temp 98.1 °F (36.7 °C)   Resp 15   Ht 5' 5\" (1.651 m)   Wt 240 lb (108.9 kg)   LMP 09/04/2022 (Approximate)   SpO2 97%   Breastfeeding Unknown   BMI 39.94 kg/m²   Gait - steady  Medication side effects(SE): Denies    Mental Status Examination:    Level of consciousness:  within normal limits   Appearance:  fair grooming and fair hygiene  Behavior/Motor: She has been aggressive  Attitude toward examiner:  cooperative  Speech:  spontaneous, normal rate and normal volume   Mood: \" I want to hurt myself and her other people. \"  Affect: Labile  Thought processes: Linear thought flight of ideas loose associations  Thought content: Reports auditory and visual hallucinations endorsed suicidal homicidal ideation   cognition:  oriented to person, place, and time   Concentration intact  Insight Limited  Judgement f limited    ASSESSMENT:   Patient symptoms are:  [] Well controlled  [x] Improving  [] Worsening  [] No change      Diagnosis:   Principal Problem:    Severe manic bipolar 1 disorder with psychotic behavior ([facility] [address]  Active Problems:    Intellectual disability    Borderline personality disorder ([facility] [address]  Resolved Problems:    * No resolved hospital problems. *      LABS:    No results for input(s): WBC, HGB, PLT in the last 72 hours. No results for input(s): NA, K, CL, CO2, BUN, CREATININE, GLUCOSE in the last 72 hours.     No results for input(s): BILITOT, ALKPHOS, Awake/Alert AST, ALT in the last 72 hours. Lab Results   Component Value Date/Time    LABAMPH NOT DETECTED 09/26/2022 04:42 PM    BARBSCNU NOT DETECTED 09/26/2022 04:42 PM    LABBENZ NOT DETECTED 09/26/2022 04:42 PM    LABMETH NOT DETECTED 09/26/2022 04:42 PM    OPIATESCREENURINE NOT DETECTED 09/26/2022 04:42 PM    PHENCYCLIDINESCREENURINE NOT DETECTED 09/26/2022 04:42 PM    ETOH <10 09/26/2022 04:42 PM     Lab Results   Component Value Date/Time    TSH 2.140 06/23/2022 05:19 AM     Lab Results   Component Value Date    LITHIUM 0.27 (L) 06/23/2022     No results found for: VALPROATE, CBMZ        Treatment Plan:  Reviewed current Medications with the patient. Risks, benefits, side effects, drug-to-drug interactions and alternatives to treatment were discussed. Collateral information:   CD evaluation  Encourage patient to attend group and other milieu activities.   Discharge planning discussed with the patient and treatment team.  Depakote 500 mg 3 times daily  Lithium 300 mg 3 times daily  To Zyprexa 10 mg twice daily  Abilify maintaina 400 mg IM every 30 days patient last received on 9/2/2022    PSYCHOTHERAPY/COUNSELING:  [x] Therapeutic interview  [x] Supportive  [] CBT  [] Ongoing  [] Other    [x] Patient continues to need, on a daily basis, active treatment furnished directly by or requiring the supervision of inpatient psychiatric personnel      Anticipated Length of stay: 3 to 7 days based on stability            Electronically signed by RALPH Tilley CNP on 10/1/2022 at 9:57 AM

## 2022-10-01 NOTE — ED NOTES
Patient doesn't know what medications she takes, unable to complete med rec during triage.      Trever Washington RN  10/01/22 1946

## 2022-10-02 NOTE — ED PROVIDER NOTES
HPI   This is a 52-year-old female patient presented to emergency department for evaluation of chest pain. Chest pain started today. She states it is sharp, reproducible with palpation of the left side. Worse with inspiration. States that she has some tingling in her left arm. Denies any shortness of breath but she does have some associated dizziness. She is reporting an associated fast heart rate. She denies having had similar symptoms in the past.  She does have significant past psychiatric history, was admitted here on 9/26 and just recently discharged for suicidal ideation, homicidal ideation, hallucinations. She denies any leg swelling. She does have cough and sore throat. Review of Systems   Constitutional:  Negative for chills and fever. HENT:  Negative for congestion. Respiratory:  Negative for cough and shortness of breath. Cardiovascular:  Positive for chest pain. Gastrointestinal:  Negative for abdominal pain, diarrhea, nausea and vomiting. Genitourinary:  Negative for difficulty urinating, dysuria and hematuria. Musculoskeletal:  Negative for back pain. Skin:  Negative for color change. All other systems reviewed and are negative. Physical Exam  Vitals and nursing note reviewed. Constitutional:       Appearance: Normal appearance. HENT:      Head: Normocephalic and atraumatic. Nose: Nose normal. No congestion. Mouth/Throat:      Mouth: Mucous membranes are moist.      Pharynx: Oropharynx is clear. Eyes:      Conjunctiva/sclera: Conjunctivae normal.      Pupils: Pupils are equal, round, and reactive to light. Cardiovascular:      Rate and Rhythm: Regular rhythm. Tachycardia present. Pulses: Normal pulses. Heart sounds: Normal heart sounds. Pulmonary:      Effort: Pulmonary effort is normal. No respiratory distress. Breath sounds: Normal breath sounds. Comments: Reproducible chest wall tenderness over the left side.   Abdominal: General: Bowel sounds are normal. There is no distension. Tenderness: There is no abdominal tenderness. Musculoskeletal:         General: Normal range of motion. Cervical back: Normal range of motion and neck supple. Skin:     General: Skin is warm and dry. Capillary Refill: Capillary refill takes less than 2 seconds. Neurological:      General: No focal deficit present. Mental Status: She is alert. Procedures     MDM  This is a 64-year-old female patient presenting to emergency department for evaluation of chest pain. Initial labs and imaging obtained. Initial troponin was 13 and repeat 13 as well. EKG showing no acute findings. No acute electrolyte abnormalities or leukocytosis. CT angiography of her chest was obtained showing no pulmonary embolism or any other acute lung findings. Urine pregnancy test negative urinalysis was negative as well. We did give the patient IV fluids and Toradol. Her symptoms did improve. Reassuring that her chest pain is all reproducible with palpation. At this time patient stable for discharge home her heart rate also improved within normal range. Was given ibuprofen to go home with. Follow-up with PCP. EKG: This EKG is signed and interpreted by me. Rate: 123  Rhythm: Sinus  Interpretation: no acute changes, no ST elevations or depressions. ,  Normal QTC  Comparison: stable as compared to patient's most recent EKG      ED Course as of 10/02/22 0027   Sat Oct 01, 2022   2048 Patient reassessed heart rate in the 90s she is sleeping comfortably in bed. [FG]   7849 Patient's chest pain had resolved after Toradol. Heart rate improved down to 95. [FG]      ED Course User Index  [FG] Rubén Roland DO       ED Course as of 10/02/22 0027   Sat Oct 01, 2022   2048 Patient reassessed heart rate in the 90s she is sleeping comfortably in bed. [FG]   9609 Patient's chest pain had resolved after Toradol. Heart rate improved down to 95.  [FG]      ED Course User Index  [FG] Marilin Villela, DO       --------------------------------------------- PAST HISTORY ---------------------------------------------  Past Medical History:  has a past medical history of ADHD, Autism disorder, Colitis, Schizophrenia ([facility] [address] and [facility] Past Surgical History:  has a past surgical history that includes Breast surgery (Right) and laparoscopy (Left, 8/9/2019). Social History:  reports that she has never smoked. She has never used smokeless tobacco. She reports that she does not drink alcohol and does not use drugs. Family History: family history includes Diabetes in her mother; Other in her mother. The patients home medications have been reviewed.     Allergies: Dye [barium-containing compounds], Sulfa antibiotics, and Versed [midazolam]    -------------------------------------------------- RESULTS -------------------------------------------------  Labs:  Results for orders placed or performed during the hospital encounter of 10/01/22   COVID-19, Rapid    Specimen: Nasopharyngeal Swab   Result Value Ref Range    SARS-CoV-2, NAAT Not Detected Not Detected   CBC with Auto Differential   Result Value Ref Range    WBC 9.3 4.5 - 11.5 E9/L    RBC 4.46 3.50 - 5.50 E12/L    Hemoglobin 11.7 11.5 - 15.5 g/dL    Hematocrit 37.7 34.0 - 48.0 %    MCV 84.5 80.0 - 99.9 fL    MCH 26.2 26.0 - 35.0 pg    MCHC 31.0 (L) 32.0 - 34.5 %    RDW 14.6 11.5 - 15.0 fL    Platelets 916 016 - 952 E9/L    MPV 9.3 7.0 - 12.0 fL    Neutrophils % 49.4 43.0 - 80.0 %    Immature Granulocytes % 2.1 0.0 - 5.0 %    Lymphocytes % 31.5 20.0 - 42.0 %    Monocytes % 11.1 2.0 - 12.0 %    Eosinophils % 5.4 0.0 - 6.0 %    Basophils % 0.5 0.0 - 2.0 %    Neutrophils Absolute 4.60 1.80 - 7.30 E9/L    Immature Granulocytes # 0.20 E9/L    Lymphocytes Absolute 2.94 1.50 - 4.00 E9/L    Monocytes Absolute 1.04 (H) 0.10 - 0.95 E9/L    Eosinophils Absolute 0.50 0.05 - 0.50 E9/L    Basophils Absolute 0.05 0.00 - 0.20 E9/L   Comprehensive Metabolic Panel w/ Reflex to MG   Result Value Ref Range    Sodium 142 132 - 146 mmol/L    Potassium reflex Magnesium 4.1 3.5 - 5.0 mmol/L    Chloride 108 (H) 98 - 107 mmol/L    CO2 22 22 - 29 mmol/L    Anion Gap 12 7 - 16 mmol/L    Glucose 107 (H) 74 - 99 mg/dL    BUN 6 6 - 20 mg/dL    Creatinine 0.9 0.5 - 1.0 mg/dL    GFR Non-African American >60 >=60 mL/min/1.73    GFR African American >60     Calcium 9.5 8.6 - 10.2 mg/dL    Total Protein 7.4 6.4 - 8.3 g/dL    Albumin 3.9 3.5 - 5.2 g/dL    Total Bilirubin <0.2 0.0 - 1.2 mg/dL    Alkaline Phosphatase 87 35 - 104 U/L    ALT 30 0 - 32 U/L    AST 17 0 - 31 U/L   Troponin   Result Value Ref Range    Troponin, High Sensitivity 13 (H) 0 - 9 ng/L   D-Dimer, Quantitative   Result Value Ref Range    D-Dimer, Quant 256 ng/mL DDU   Urinalysis with Microscopic   Result Value Ref Range    Color, UA Yellow Straw/Yellow    Clarity, UA Clear Clear    Glucose, Ur Negative Negative mg/dL    Bilirubin Urine Negative Negative    Ketones, Urine Negative Negative mg/dL    Specific Gravity, UA <=1.005 1.005 - 1.030    Blood, Urine TRACE-INTACT Negative    pH, UA 7.0 5.0 - 9.0    Protein, UA Negative Negative mg/dL    Urobilinogen, Urine 0.2 <2.0 E.U./dL    Nitrite, Urine Negative Negative    Leukocyte Esterase, Urine Negative Negative    WBC, UA 0-1 0 - 5 /HPF    RBC, UA 1-3 0 - 2 /HPF    Bacteria, UA FEW (A) None Seen /HPF   Brain Natriuretic Peptide   Result Value Ref Range    Pro-BNP 18 0 - 125 pg/mL   Pregnancy, urine   Result Value Ref Range    HCG(Urine) Pregnancy Test NEGATIVE NEGATIVE   Troponin   Result Value Ref Range    Troponin, High Sensitivity 13 (H) 0 - 9 ng/L       Radiology:  CTA PULMONARY W CONTRAST   Final Result   No evidence of pulmonary embolism or acute pulmonary abnormality. XR CHEST PORTABLE   Final Result   No acute process.              ------------------------- NURSING NOTES AND VITALS REVIEWED ---------------------------  Date / Time Roomed:  10/1/2022  7:29 PM  ED Bed Assignment:  11/11    The nursing notes within the ED encounter and vital signs as below have been reviewed. /82   Pulse (!) 104   Temp 97.8 °F (36.6 °C)   Resp 23   LMP 09/04/2022 (Approximate)   SpO2 99%   Oxygen Saturation Interpretation: Normal    --------------------------------- ADDITIONAL PROVIDER NOTES ---------------------------------  At this time the patient is without objective evidence of an acute process requiring hospitalization or inpatient management. They have remained hemodynamically stable throughout their entire ED visit and are stable for discharge with outpatient follow-up. The plan has been discussed in detail and they are aware of the specific conditions for emergent return, as well as the importance of follow-up. Discharge Medication List as of 10/1/2022 11:13 PM        START taking these medications    Details   ibuprofen (ADVIL;MOTRIN) 800 MG tablet Take 1 tablet by mouth every 8 hours as needed for Pain, Disp-30 tablet, R-0Normal             Diagnosis:  1. Chest pain, unspecified type        Disposition:  Patient's disposition: Discharge to home  Patient's condition is stable.        Davey Shin, DO  Resident  10/02/22 6553

## 2022-10-03 LAB
EKG ATRIAL RATE: 123 BPM
EKG P AXIS: 35 DEGREES
EKG P-R INTERVAL: 136 MS
EKG Q-T INTERVAL: 308 MS
EKG QRS DURATION: 68 MS
EKG QTC CALCULATION (BAZETT): 440 MS
EKG R AXIS: 18 DEGREES
EKG T AXIS: 17 DEGREES
EKG VENTRICULAR RATE: 123 BPM

## 2022-10-03 NOTE — DISCHARGE SUMMARY
DISCHARGE SUMMARY        Patient ID:  Rodríguez Castro  [id number]  23 y.o.  2000     Admit date: 9/26/2022     Discharge date and time: 10/3/2022     Admitting Physician: Joann Rose MD      Discharge Physician: Dr Bryon Blizzard MD     Discharge Diagnoses:       Patient Active Problem List   Diagnosis    Pelvic mass in female    Post-op pain    Post-operative pain    Altered mental status    Seizures (HCC)    Severe manic bipolar 1 disorder with psychotic behavior ([facility] [address]    Intellectual disability    Borderline personality disorder [facility])         Admission Condition: poor     Discharged Condition: stable     Admission Circumstance: presented to the ED sent in by group home that she wants to kill her self with a plan of choking herself and wants to die.         PAST MEDICAL/PSYCHIATRIC HISTORY:   Past Medical History        Past Medical History:   Diagnosis Date    ADHD      Autism disorder      Colitis      Schizophrenia ([facility] [address]      Seizures ([facility] [address]              FAMILY/SOCIAL HISTORY:  Family History         Family History   Problem Relation Age of Onset    Diabetes Mother      Other Mother           lupus         Social History               Socioeconomic History    Marital status: Single       Spouse name: Not on file    Number of children: Not on file    Years of education: Not on file    Highest education level: Not on file   Occupational History    Not on file   Tobacco Use    Smoking status: Never    Smokeless tobacco: Never   Vaping Use    Vaping Use: Never used   Substance and Sexual Activity    Alcohol use: No    Drug use: No    Sexual activity: Not on file   Other Topics Concern    Not on file   Social History Narrative    Not on file      Social Determinants of Health      Financial Resource Strain: Not on file   Food Insecurity: Not on file   Transportation Needs: Not on file   Physical Activity: Not on file   Stress: Not on file   Social Connections: Not on file   Intimate Partner Violence: Not on file Housing Stability: Not on file            MEDICATIONS:    Current Medication   No current facility-administered medications for this encounter. Current Outpatient Medications:     divalproex (DEPAKOTE) 500 MG DR tablet, Take 1 tablet by mouth 3 times daily, Disp: 90 tablet, Rfl: 0    levETIRAcetam (KEPPRA) 1000 MG tablet, Take 1 tablet by mouth 2 times daily, Disp: 60 tablet, Rfl: 3    OLANZapine zydis (ZYPREXA) 10 MG disintegrating tablet, Take 1 tablet by mouth in the morning and at bedtime, Disp: 60 tablet, Rfl: 0    miconazole (MICOTIN) 2 % powder, Apply topically 2 times daily. , Disp: 45 g, Rfl: 1    melatonin 3 MG TABS tablet, Take 1 tablet by mouth nightly, Disp: 30 tablet, Rfl: 0    ARIPiprazole ER (ABILIFY MAINTENA) 400 MG PRSY, Inject 400 mg into the muscle every 30 days, Disp: 1 each, Rfl: 0    lithium 300 MG capsule, Take 1 capsule by mouth 3 times daily (with meals), Disp: 90 capsule, Rfl: 0    ibuprofen (ADVIL;MOTRIN) 800 MG tablet, Take 1 tablet by mouth every 8 hours as needed for Pain, Disp: 30 tablet, Rfl: 0    pantoprazole (PROTONIX) 40 MG tablet, Take 40 mg by mouth daily , Disp: , Rfl:         Examination:  /79   Pulse 99   Temp 98.1 °F (36.7 °C)   Resp 15   Ht 5' 5\" (1.651 m)   Wt 240 lb (108.9 kg)   LMP 09/04/2022 (Approximate)   SpO2 97%   Breastfeeding Unknown   BMI 39.94 kg/m²   Gait - steady     HOSPITAL COURSE[de-identified]  Patient was admitted to the unit on 9/26/2022 was closely monitored for suicidal ideations and psychosis. She was evaluated and treated with Depakote 500 mg 3 times daily, Keppra 1000 mg twice daily, Zyprexa 10 mg twice daily and Abilify maintainer 400 mg IM every 30 days which patient takes on an outpatient basis as well as lithium 300 mg 3 times daily. Patient required Depakote and lithium levels to be drawn on outpatient basis it appears that she refuses labs while inpatient.   Medical events were insignificant and patient continued to improve on the floor.  She started coming out of her room she was attending groups to socializing with peers. Treatment team felt the patient obtain the maximum benefit for her hospitalization She was set up with an outpatient mental health agency for outpatient follow-up services . At the time of discharge patient  did not show impulsive behavior. She stated she wanted to be discharged back to her group home. She was up on the unit she was attending groups and socializing with peers. She vehemently denied any suicidal homicidal ideations intent or plan. She was eating well and sleeping well there are no neurovegetative signs or symptoms of depression she denied any auditory visualizations. There are no overt or covert signs psychosis. She was appreciative of the help that she received here. This patient no longer meets criteria for inpatient hospitalization. No AVH or paranoid thoughts  No hopeless or worthless feeling  No active SI/HI  Appetite:  [x] Normal  [] Increased  [] Decreased    Sleep:       [x] Normal  [] Fair       [] Poor               Energy:    [x] Normal  [] Increased  [] Decreased     SI [] Present  [x] Absent  HI  []Present  [x] Absent   Aggression:  [] yes  [x] no  Patient is [x] able  [] unable to CONTRACT FOR SAFETY   Medication side effects(SE):   [x] None(Psych. Meds.)          [] Other        Mental Status Examination on discharge:    Level of consciousness:  within normal limits   Appearance:  well-appearing  Behavior/Motor:  no abnormalities noted  Attitude toward examiner:  attentive and good eye contact  Speech:  spontaneous, normal rate and normal volume   Mood: \"I feel better. \"  Affect: Appropriate and pleasant  Thought processes: Linear without flight of ideas loose associations  Thought content: Devoid of any auditory visual hallucinations delusions or other perceptual normalities.   Denies SI/HI intent or plan  Cognition:  oriented to person, place, and time   Concentration intact  Memory intact  Insight good   Judgement fair   Fund of Knowledge adequate        ASSESSMENT:  Patient symptoms are:  [x] Well controlled  [x] Improving  [] Worsening  [] No change     Reason for more than one antipsychotic:  [] N/A  [] 3 Failed Monotherapy attempts (Drugs tried:)  [] Crossover to a new antipsychotic  [x] Taper to Monotherapy from Polypharmacy pt outpatient medications are dual antipsychotics patient can to need to be tapered to monotherapy on an outpatient basis  [] Augmentation of clozapine therapy due to treatment resistance to single therapy     Diagnosis:  Principal Problem:    Severe manic bipolar 1 disorder with psychotic behavior ([facility] [address]  Active Problems:    Intellectual disability    Borderline personality disorder ([facility] [address]  Resolved Problems:    * No resolved hospital problems. *        LABS:         Recent Labs     10/01/22  2007   WBC 9.3   HGB 11.7             Recent Labs     10/01/22  2007      K 4.1   *   CO2 22   BUN 6   CREATININE 0.9   GLUCOSE 107*          Recent Labs     10/01/22  2007   BILITOT <0.2   ALKPHOS 87   AST 17   ALT 30            Lab Results   Component Value Date/Time     LABAMPH NOT DETECTED 09/26/2022 04:42 PM     BARBSCNU NOT DETECTED 09/26/2022 04:42 PM     LABBENZ NOT DETECTED 09/26/2022 04:42 PM     LABMETH NOT DETECTED 09/26/2022 04:42 PM     OPIATESCREENURINE NOT DETECTED 09/26/2022 04:42 PM     PHENCYCLIDINESCREENURINE NOT DETECTED 09/26/2022 04:42 PM     ETOH <10 09/26/2022 04:42 PM            Lab Results   Component Value Date/Time     TSH 2.140 06/23/2022 05:19 AM            Lab Results   Component Value Date     LITHIUM 0.27 (L) 06/23/2022      No results found for: VALPROATE, CBMZ     RISK ASSESSMENT AT DISCHARGE: Low risk for suicide and homicide. Treatment Plan:  Reviewed current Medications with the patient. Education provided on the complaince with treatment.      Risks, benefits, side effects, drug-to-drug interactions and alternatives to treatment were discussed. Encourage patient to attend outpatient follow up appointment and therapy. Patient was advised to call the outpatient provider, visit the nearest ED or call 911 if symptoms are not manageable. Patient's family member was contacted prior to the discharge. Medication List          START taking these medications       ARIPiprazole  MG Prsy  Commonly known as: ABILIFY MAINTENA  Inject 400 mg into the muscle every 30 days  Replaces: ARIPiprazole  MG Srer      divalproex 500 MG DR tablet  Commonly known as: DEPAKOTE  Take 1 tablet by mouth 3 times daily      melatonin 3 MG Tabs tablet  Take 1 tablet by mouth nightly      miconazole 2 % powder  Commonly known as: MICOTIN  Apply topically 2 times daily.       OLANZapine zydis 10 MG disintegrating tablet  Commonly known as: ZYPREXA  Take 1 tablet by mouth in the morning and at bedtime                CONTINUE taking these medications       levETIRAcetam 1000 MG tablet  Commonly known as: KEPPRA  Take 1 tablet by mouth 2 times daily      lithium 300 MG capsule  Take 1 capsule by mouth 3 times daily (with meals)      pantoprazole 40 MG tablet  Commonly known as: PROTONIX                STOP taking these medications       amLODIPine 5 MG tablet  Commonly known as: NORVASC      ARIPiprazole  MG Srer  Commonly known as: ABILIFY MAINTENA  Replaced by: ARIPiprazole  MG Prsy      LORazepam 0.5 MG tablet  Commonly known as: ATIVAN      propranolol 10 MG tablet  Commonly known as: INDERAL      QUEtiapine 100 MG tablet  Commonly known as: SEROQUEL      QUEtiapine 200 MG tablet  Commonly known as: SEROQUEL                    Where to Get Your Medications          These medications [facility] to [facility], [facility] [address]  [address] [place] [address]        Phone: [phone]   ARIPiprazole  MG Prsy  divalproex 500 MG DR tablet  levETIRAcetam 1000 MG tablet  lithium 300 MG capsule  melatonin 3 MG Tabs tablet  miconazole 2 % powder  OLANZapine zydis 10 MG disintegrating tablet         Patient is counseled to rest been compliant with all medications outpatient follow-up appointments     Patient is discharged home in stable condition     TIME SPEND - 35 MINUTES TO COMPLETE THE EVALUATION, DISCHARGE SUMMARY, MEDICATION RECONCILIATION AND FOLLOW UP CARE

## 2022-11-09 ENCOUNTER — APPOINTMENT (OUTPATIENT)
Dept: GENERAL RADIOLOGY | Age: 22
DRG: 753 | End: 2022-11-09
Payer: COMMERCIAL

## 2022-11-09 ENCOUNTER — HOSPITAL ENCOUNTER (INPATIENT)
Age: 22
LOS: 5 days | Discharge: HOME | DRG: 753 | End: 2022-11-15
Attending: EMERGENCY MEDICINE | Admitting: PSYCHIATRY & NEUROLOGY
Payer: COMMERCIAL

## 2022-11-09 DIAGNOSIS — F31.2 SEVERE MANIC BIPOLAR 1 DISORDER WITH PSYCHOTIC BEHAVIOR (HCC): Primary | ICD-10-CM

## 2022-11-09 DIAGNOSIS — R07.9 CHEST PAIN, UNSPECIFIED TYPE: ICD-10-CM

## 2022-11-09 DIAGNOSIS — F39 MOOD DISORDER (HCC): ICD-10-CM

## 2022-11-09 LAB
ACETAMINOPHEN LEVEL: <5 MCG/ML (ref 10–30)
ALBUMIN SERPL-MCNC: 4.1 G/DL (ref 3.5–5.2)
ALP BLD-CCNC: 63 U/L (ref 35–104)
ALT SERPL-CCNC: 16 U/L (ref 0–32)
AMPHETAMINE SCREEN, URINE: NOT DETECTED
ANION GAP SERPL CALCULATED.3IONS-SCNC: 12 MMOL/L (ref 7–16)
AST SERPL-CCNC: 22 U/L (ref 0–31)
BARBITURATE SCREEN URINE: NOT DETECTED
BASOPHILS ABSOLUTE: 0.02 E9/L (ref 0–0.2)
BASOPHILS RELATIVE PERCENT: 0.3 % (ref 0–2)
BENZODIAZEPINE SCREEN, URINE: NOT DETECTED
BILIRUB SERPL-MCNC: 0.2 MG/DL (ref 0–1.2)
BUN BLDV-MCNC: 11 MG/DL (ref 6–20)
CALCIUM SERPL-MCNC: 9.6 MG/DL (ref 8.6–10.2)
CANNABINOID SCREEN URINE: NOT DETECTED
CHLORIDE BLD-SCNC: 103 MMOL/L (ref 98–107)
CO2: 24 MMOL/L (ref 22–29)
COCAINE METABOLITE SCREEN URINE: NOT DETECTED
CREAT SERPL-MCNC: 1 MG/DL (ref 0.5–1)
D DIMER: <200 NG/ML DDU
EOSINOPHILS ABSOLUTE: 0.35 E9/L (ref 0.05–0.5)
EOSINOPHILS RELATIVE PERCENT: 4.7 % (ref 0–6)
ETHANOL: <10 MG/DL (ref 0–0.08)
FENTANYL SCREEN, URINE: NOT DETECTED
GFR SERPL CREATININE-BSD FRML MDRD: >60 ML/MIN/1.73
GLUCOSE BLD-MCNC: 115 MG/DL (ref 74–99)
HCG, URINE, POC: NEGATIVE
HCT VFR BLD CALC: 38.2 % (ref 34–48)
HEMOGLOBIN: 11.9 G/DL (ref 11.5–15.5)
IMMATURE GRANULOCYTES #: 0.12 E9/L
IMMATURE GRANULOCYTES %: 1.6 % (ref 0–5)
INFLUENZA A: NOT DETECTED
INFLUENZA B: NOT DETECTED
LYMPHOCYTES ABSOLUTE: 1.72 E9/L (ref 1.5–4)
LYMPHOCYTES RELATIVE PERCENT: 23.2 % (ref 20–42)
Lab: NORMAL
Lab: NORMAL
MCH RBC QN AUTO: 27.5 PG (ref 26–35)
MCHC RBC AUTO-ENTMCNC: 31.2 % (ref 32–34.5)
MCV RBC AUTO: 88.4 FL (ref 80–99.9)
METHADONE SCREEN, URINE: NOT DETECTED
MONOCYTES ABSOLUTE: 0.93 E9/L (ref 0.1–0.95)
MONOCYTES RELATIVE PERCENT: 12.6 % (ref 2–12)
NEGATIVE QC PASS/FAIL: NORMAL
NEUTROPHILS ABSOLUTE: 4.26 E9/L (ref 1.8–7.3)
NEUTROPHILS RELATIVE PERCENT: 57.6 % (ref 43–80)
OPIATE SCREEN URINE: NOT DETECTED
OXYCODONE URINE: NOT DETECTED
PDW BLD-RTO: 16 FL (ref 11.5–15)
PHENCYCLIDINE SCREEN URINE: NOT DETECTED
PLATELET # BLD: 267 E9/L (ref 130–450)
PMV BLD AUTO: 10.2 FL (ref 7–12)
POSITIVE QC PASS/FAIL: NORMAL
POTASSIUM SERPL-SCNC: 3.8 MMOL/L (ref 3.5–5)
PRO-BNP: 52 PG/ML (ref 0–125)
RBC # BLD: 4.32 E12/L (ref 3.5–5.5)
SALICYLATE, SERUM: <0.3 MG/DL (ref 0–30)
SARS-COV-2 RNA, RT PCR: NOT DETECTED
SODIUM BLD-SCNC: 139 MMOL/L (ref 132–146)
TOTAL PROTEIN: 7.4 G/DL (ref 6.4–8.3)
TRICYCLIC ANTIDEPRESSANTS SCREEN SERUM: NEGATIVE NG/ML
TROPONIN, HIGH SENSITIVITY: 20 NG/L (ref 0–9)
TROPONIN, HIGH SENSITIVITY: 21 NG/L (ref 0–9)
VALPROIC ACID LEVEL: 66 MCG/ML (ref 50–100)
WBC # BLD: 7.4 E9/L (ref 4.5–11.5)

## 2022-11-09 PROCEDURE — 71045 X-RAY EXAM CHEST 1 VIEW: CPT

## 2022-11-09 PROCEDURE — 99285 EMERGENCY DEPT VISIT HI MDM: CPT

## 2022-11-09 PROCEDURE — 84484 ASSAY OF TROPONIN QUANT: CPT

## 2022-11-09 PROCEDURE — 80179 DRUG ASSAY SALICYLATE: CPT

## 2022-11-09 PROCEDURE — 83880 ASSAY OF NATRIURETIC PEPTIDE: CPT

## 2022-11-09 PROCEDURE — 82077 ASSAY SPEC XCP UR&BREATH IA: CPT

## 2022-11-09 PROCEDURE — 85378 FIBRIN DEGRADE SEMIQUANT: CPT

## 2022-11-09 PROCEDURE — 80053 COMPREHEN METABOLIC PANEL: CPT

## 2022-11-09 PROCEDURE — 80143 DRUG ASSAY ACETAMINOPHEN: CPT

## 2022-11-09 PROCEDURE — 85025 COMPLETE CBC W/AUTO DIFF WBC: CPT

## 2022-11-09 PROCEDURE — 80164 ASSAY DIPROPYLACETIC ACD TOT: CPT

## 2022-11-09 PROCEDURE — 6370000000 HC RX 637 (ALT 250 FOR IP): Performed by: EMERGENCY MEDICINE

## 2022-11-09 PROCEDURE — 80307 DRUG TEST PRSMV CHEM ANLYZR: CPT

## 2022-11-09 PROCEDURE — 93005 ELECTROCARDIOGRAM TRACING: CPT | Performed by: EMERGENCY MEDICINE

## 2022-11-09 PROCEDURE — 36415 COLL VENOUS BLD VENIPUNCTURE: CPT

## 2022-11-09 PROCEDURE — 87636 SARSCOV2 & INF A&B AMP PRB: CPT

## 2022-11-09 RX ORDER — LORAZEPAM 1 MG/1
1 TABLET ORAL ONCE
Status: COMPLETED | OUTPATIENT
Start: 2022-11-09 | End: 2022-11-09

## 2022-11-09 RX ADMIN — LORAZEPAM 1 MG: 1 TABLET ORAL at 21:31

## 2022-11-09 ASSESSMENT — LIFESTYLE VARIABLES
HOW MANY STANDARD DRINKS CONTAINING ALCOHOL DO YOU HAVE ON A TYPICAL DAY: PATIENT DOES NOT DRINK
HOW OFTEN DO YOU HAVE A DRINK CONTAINING ALCOHOL: NEVER

## 2022-11-09 ASSESSMENT — PAIN - FUNCTIONAL ASSESSMENT
PAIN_FUNCTIONAL_ASSESSMENT: NONE - DENIES PAIN
PAIN_FUNCTIONAL_ASSESSMENT: NONE - DENIES PAIN

## 2022-11-09 NOTE — ED PROVIDER NOTES
HPI:  11/9/22, Time: 2:02 PM COLETTE Matson is a 25 y.o. female presenting to the ED for suicidal ideation beginning today. Patient is from [facility], she presents with a caretaker. Symptoms have been moderate in severity and constant with no exacerbating or alleviating factors. She has a plan to [facility] herself. \"  I reviewed her chart. She is a history of schizophrenia, ADHD, and autism. Patient also states has been having chest pain since today. She describes it as an achiness in the center of her chest which is nonradiating with no associated symptoms. She is denying any fevers, cough, shortness of breath, abdominal pain, nausea, vomiting, diarrhea. No recent travel or immobilization, leg edema, calf tenderness, hormone use, or history of DVT/PE. No cardiac history. Review of Systems:   Pertinent positives and negatives are stated within HPI, all other systems reviewed and are negative.          --------------------------------------------- PAST HISTORY ---------------------------------------------  Past Medical History:  has a past medical history of ADHD, Autism disorder, Colitis, Schizophrenia ([facility] [address] and Seizures ([facility] Past Surgical History:  has a past surgical history that includes Breast surgery (Right) and laparoscopy (Left, 8/9/2019). Social History:  reports that she has never smoked. She has never used smokeless tobacco. She reports that she does not drink alcohol and does not use drugs. Family History: family history includes Diabetes in her mother; Other in her mother. The patients home medications have been reviewed.     Allergies: Dye [barium-containing compounds], Sulfa antibiotics, and Versed [midazolam]    -------------------------------------------------- RESULTS -------------------------------------------------  All laboratory and radiology results have been personally reviewed by myself   LABS:  Results for orders placed or performed during the hospital encounter of 11/09/22   COVID-19 & Influenza Combo    Specimen: Nasopharyngeal Swab   Result Value Ref Range    SARS-CoV-2 RNA, RT PCR NOT DETECTED NOT DETECTED    INFLUENZA A NOT DETECTED NOT DETECTED    INFLUENZA B NOT DETECTED NOT DETECTED   CBC with Auto Differential   Result Value Ref Range    WBC 7.4 4.5 - 11.5 E9/L    RBC 4.32 3.50 - 5.50 E12/L    Hemoglobin 11.9 11.5 - 15.5 g/dL    Hematocrit 38.2 34.0 - 48.0 %    MCV 88.4 80.0 - 99.9 fL    MCH 27.5 26.0 - 35.0 pg    MCHC 31.2 (L) 32.0 - 34.5 %    RDW 16.0 (H) 11.5 - 15.0 fL    Platelets 570 542 - 972 E9/L    MPV 10.2 7.0 - 12.0 fL    Neutrophils % 57.6 43.0 - 80.0 %    Immature Granulocytes % 1.6 0.0 - 5.0 %    Lymphocytes % 23.2 20.0 - 42.0 %    Monocytes % 12.6 (H) 2.0 - 12.0 %    Eosinophils % 4.7 0.0 - 6.0 %    Basophils % 0.3 0.0 - 2.0 %    Neutrophils Absolute 4.26 1.80 - 7.30 E9/L    Immature Granulocytes # 0.12 E9/L    Lymphocytes Absolute 1.72 1.50 - 4.00 E9/L    Monocytes Absolute 0.93 0.10 - 0.95 E9/L    Eosinophils Absolute 0.35 0.05 - 0.50 E9/L    Basophils Absolute 0.02 0.00 - 0.20 E9/L   CMP   Result Value Ref Range    Sodium 139 132 - 146 mmol/L    Potassium 3.8 3.5 - 5.0 mmol/L    Chloride 103 98 - 107 mmol/L    CO2 24 22 - 29 mmol/L    Anion Gap 12 7 - 16 mmol/L    Glucose 115 (H) 74 - 99 mg/dL    BUN 11 6 - 20 mg/dL    Creatinine 1.0 0.5 - 1.0 mg/dL    Est, Glom Filt Rate >60 >=60 mL/min/1.73    Calcium 9.6 8.6 - 10.2 mg/dL    Total Protein 7.4 6.4 - 8.3 g/dL    Albumin 4.1 3.5 - 5.2 g/dL    Total Bilirubin 0.2 0.0 - 1.2 mg/dL    Alkaline Phosphatase 63 35 - 104 U/L    ALT 16 0 - 32 U/L    AST 22 0 - 31 U/L   Serum Drug Screen   Result Value Ref Range    Ethanol Lvl <10 mg/dL    Acetaminophen Level <5.0 (L) 10.0 - 37.5 mcg/mL    Salicylate, Serum <3.7 0.0 - 30.0 mg/dL    TCA Scrn NEGATIVE Cutoff:300 ng/mL   Urine Drug Screen   Result Value Ref Range    Amphetamine Screen, Urine NOT DETECTED Negative <1000 ng/mL    Barbiturate Screen, Ur NOT DETECTED Negative < 200 ng/mL    Benzodiazepine Screen, Urine NOT DETECTED Negative < 200 ng/mL    Cannabinoid Scrn, Ur NOT DETECTED Negative < 50ng/mL    Cocaine Metabolite Screen, Urine NOT DETECTED Negative < 300 ng/mL    Opiate Scrn, Ur NOT DETECTED Negative < 300ng/mL    PCP Screen, Urine NOT DETECTED Negative < 25 ng/mL    Methadone Screen, Urine NOT DETECTED Negative <300 ng/mL    Oxycodone Urine NOT DETECTED Negative <100 ng/mL    FENTANYL SCREEN, URINE NOT DETECTED Negative <1 ng/mL    Drug Screen Comment: see below    Troponin   Result Value Ref Range    Troponin, High Sensitivity 21 (H) 0 - 9 ng/L   Brain Natriuretic Peptide   Result Value Ref Range    Pro-BNP 52 0 - 125 pg/mL   Troponin   Result Value Ref Range    Troponin, High Sensitivity 20 (H) 0 - 9 ng/L   D-Dimer, Quantitative   Result Value Ref Range    D-Dimer, Quant <200 ng/mL DDU   POC Pregnancy Urine Qual   Result Value Ref Range    HCG, Urine, POC Negative Negative    Lot Number [id number]     Positive QC Pass/Fail Pass     Negative QC Pass/Fail Pass        RADIOLOGY:  Interpreted by Radiologist.  XR CHEST PORTABLE   Final Result   No acute process. ------------------------- NURSING NOTES AND VITALS REVIEWED ---------------------------   The nursing notes within the ED encounter and vital signs as below have been reviewed. /82   Pulse (!) 105   Temp 98.5 °F (36.9 °C) (Oral)   Resp 20   Ht 5' 4\" (1.626 m)   Wt (!) 350 lb (158.8 kg)   SpO2 98%   BMI 60.08 kg/m²   Oxygen Saturation Interpretation: Normal      ---------------------------------------------------PHYSICAL EXAM--------------------------------------      Constitutional/General: Alert and oriented x3, well appearing, non toxic in NAD  Head: Normocephalic and atraumatic  Eyes: PERRL, EOMI  Mouth: Oropharynx clear, handling secretions, no trismus  Neck: Supple, full ROM,   Pulmonary: Lungs clear to auscultation bilaterally, no wheezes, rales, or rhonchi.  Not in respiratory distress  Cardiovascular:  Regular rate and rhythm, no murmurs, gallops, or rubs. 2+ distal pulses  Abdomen: Soft, non tender, non distended,   Extremities: Moves all extremities x 4. Warm and well perfused. No leg edema, no calf tenderness  Skin: warm and dry without rash  Neurologic: GCS 15, no focal motor or sensory deficits   Psych: Flat Affect. + SI with plan to choke herself.      ------------------------------ ED COURSE/MEDICAL DECISION MAKING----------------------  Medications - No data to display    Medical Decision Making/ED COURSE:   Patient is a 81-year-old female presenting with suicidal ideation. Also reporting chest pain which is atypical for ACS. She has low cardiac risk. Medical screening exam was reassuring without acute findings. Medical screening labs obtained. High-sensitivity troponin was stable x2. No acute EKG changes. D-dimer negative. No PE risk factors. PE not suspected. Chest x-ray clear. Labs otherwise reassuring without acute findings. Patient is medically clear for psychiatric evaluation. Patient remained hemodynamically stable throughout ED course. ED Course as of 11/09/22 1713   Wed Nov 09, 2022   1435 EKG: This EKG is signed and interpreted by me, Dr. Ruddy Foy MD    Rate: 96  Rhythm: Sinus  Interpretation: Normal sinus rhythm, normal MO interval, normal QRS, normal axis, normal QT interval, no acute ST or T wave changes, nonspecific T wave abnormalities appear unchanged when compared to prior EKG  Comparison: stable as compared to patient's most recent EKG   [JA]      ED Course User Index  [JA] Halina Rico MD       New Prescriptions    No medications on file     Halina Rico MD      Counseling: The emergency provider has spoken with the patient and caregiver and discussed todays results, in addition to providing specific details for the plan of care and counseling regarding the diagnosis and prognosis.   Questions are answered at this time and they are agreeable with the plan.      --------------------------------- IMPRESSION AND DISPOSITION ---------------------------------    IMPRESSION  1. Mood disorder ([address]    2. Chest pain, unspecified type        DISPOSITION  Disposition: Medically clear for psychiatric evaluation  Patient condition is stable      NOTE: This report was transcribed using voice recognition software.  Every effort was made to ensure accuracy; however, inadvertent computerized transcription errors may be present    I, Kenyon Clifton MD, am the primary provider of this record        Kenyon Clifton MD  11/09/22 1334

## 2022-11-10 PROBLEM — R45.851 SUICIDAL IDEATION: Status: RESOLVED | Noted: 2022-11-10 | Resolved: 2022-11-10

## 2022-11-10 PROBLEM — R45.851 SUICIDAL IDEATION: Status: ACTIVE | Noted: 2022-11-10

## 2022-11-10 PROCEDURE — 6360000002 HC RX W HCPCS: Performed by: PSYCHIATRY & NEUROLOGY

## 2022-11-10 PROCEDURE — 90792 PSYCH DIAG EVAL W/MED SRVCS: CPT | Performed by: NURSE PRACTITIONER

## 2022-11-10 PROCEDURE — 6370000000 HC RX 637 (ALT 250 FOR IP): Performed by: PSYCHIATRY & NEUROLOGY

## 2022-11-10 PROCEDURE — 1240000000 HC EMOTIONAL WELLNESS R&B

## 2022-11-10 PROCEDURE — 6360000002 HC RX W HCPCS

## 2022-11-10 PROCEDURE — 6370000000 HC RX 637 (ALT 250 FOR IP): Performed by: NURSE PRACTITIONER

## 2022-11-10 RX ORDER — LORAZEPAM 2 MG/ML
INJECTION INTRAMUSCULAR
Status: COMPLETED
Start: 2022-11-10 | End: 2022-11-10

## 2022-11-10 RX ORDER — OLANZAPINE 5 MG/1
10 TABLET, ORALLY DISINTEGRATING ORAL 2 TIMES DAILY
Status: DISCONTINUED | OUTPATIENT
Start: 2022-11-10 | End: 2022-11-15 | Stop reason: HOSPADM

## 2022-11-10 RX ORDER — NICOTINE 21 MG/24HR
1 PATCH, TRANSDERMAL 24 HOURS TRANSDERMAL DAILY
Status: DISCONTINUED | OUTPATIENT
Start: 2022-11-10 | End: 2022-11-15 | Stop reason: HOSPADM

## 2022-11-10 RX ORDER — HALOPERIDOL 5 MG/1
5 TABLET ORAL EVERY 6 HOURS PRN
Status: DISCONTINUED | OUTPATIENT
Start: 2022-11-10 | End: 2022-11-15 | Stop reason: HOSPADM

## 2022-11-10 RX ORDER — MAGNESIUM HYDROXIDE/ALUMINUM HYDROXICE/SIMETHICONE 120; 1200; 1200 MG/30ML; MG/30ML; MG/30ML
30 SUSPENSION ORAL PRN
Status: DISCONTINUED | OUTPATIENT
Start: 2022-11-10 | End: 2022-11-15 | Stop reason: HOSPADM

## 2022-11-10 RX ORDER — LANOLIN ALCOHOL/MO/W.PET/CERES
3 CREAM (GRAM) TOPICAL NIGHTLY
Status: DISCONTINUED | OUTPATIENT
Start: 2022-11-10 | End: 2022-11-15 | Stop reason: HOSPADM

## 2022-11-10 RX ORDER — LEVETIRACETAM 500 MG/1
1000 TABLET ORAL 2 TIMES DAILY
Status: DISCONTINUED | OUTPATIENT
Start: 2022-11-10 | End: 2022-11-15 | Stop reason: HOSPADM

## 2022-11-10 RX ORDER — ACETAMINOPHEN 325 MG/1
650 TABLET ORAL EVERY 6 HOURS PRN
Status: DISCONTINUED | OUTPATIENT
Start: 2022-11-10 | End: 2022-11-15 | Stop reason: HOSPADM

## 2022-11-10 RX ORDER — LORAZEPAM 2 MG/ML
2 INJECTION INTRAMUSCULAR EVERY 6 HOURS PRN
Status: DISCONTINUED | OUTPATIENT
Start: 2022-11-10 | End: 2022-11-12

## 2022-11-10 RX ORDER — DIVALPROEX SODIUM 500 MG/1
500 TABLET, DELAYED RELEASE ORAL 3 TIMES DAILY
Status: DISCONTINUED | OUTPATIENT
Start: 2022-11-10 | End: 2022-11-15 | Stop reason: HOSPADM

## 2022-11-10 RX ORDER — HALOPERIDOL 5 MG/ML
5 INJECTION INTRAMUSCULAR EVERY 6 HOURS PRN
Status: DISCONTINUED | OUTPATIENT
Start: 2022-11-10 | End: 2022-11-15 | Stop reason: HOSPADM

## 2022-11-10 RX ORDER — LITHIUM CARBONATE 300 MG/1
300 CAPSULE ORAL
Status: DISCONTINUED | OUTPATIENT
Start: 2022-11-10 | End: 2022-11-15 | Stop reason: HOSPADM

## 2022-11-10 RX ORDER — HYDROXYZINE PAMOATE 25 MG/1
50 CAPSULE ORAL 3 TIMES DAILY PRN
Status: DISCONTINUED | OUTPATIENT
Start: 2022-11-10 | End: 2022-11-15 | Stop reason: HOSPADM

## 2022-11-10 RX ADMIN — LEVETIRACETAM 1000 MG: 500 TABLET, FILM COATED ORAL at 21:08

## 2022-11-10 RX ADMIN — MELATONIN 3 MG ORAL TABLET 3 MG: 3 TABLET ORAL at 21:08

## 2022-11-10 RX ADMIN — HYDROXYZINE PAMOATE 50 MG: 50 CAPSULE ORAL at 21:08

## 2022-11-10 RX ADMIN — LORAZEPAM 2 MG: 2 INJECTION INTRAMUSCULAR; INTRAVENOUS at 09:03

## 2022-11-10 RX ADMIN — HYDROXYZINE PAMOATE 50 MG: 50 CAPSULE ORAL at 01:57

## 2022-11-10 RX ADMIN — LITHIUM CARBONATE 300 MG: 300 CAPSULE, GELATIN COATED ORAL at 17:26

## 2022-11-10 RX ADMIN — OLANZAPINE 10 MG: 5 TABLET, ORALLY DISINTEGRATING ORAL at 21:08

## 2022-11-10 RX ADMIN — HALOPERIDOL 5 MG: 5 TABLET ORAL at 01:57

## 2022-11-10 RX ADMIN — HALOPERIDOL LACTATE 5 MG: 5 INJECTION, SOLUTION INTRAMUSCULAR at 09:03

## 2022-11-10 RX ADMIN — HYDROXYZINE PAMOATE 50 MG: 50 CAPSULE ORAL at 11:30

## 2022-11-10 RX ADMIN — DIVALPROEX SODIUM 500 MG: 500 TABLET, DELAYED RELEASE ORAL at 17:26

## 2022-11-10 ASSESSMENT — SLEEP AND FATIGUE QUESTIONNAIRES
AVERAGE NUMBER OF SLEEP HOURS: 8
DO YOU HAVE DIFFICULTY SLEEPING: YES
DO YOU USE A SLEEP AID: NO
DO YOU HAVE DIFFICULTY SLEEPING: NO
AVERAGE NUMBER OF SLEEP HOURS: 8
DO YOU USE A SLEEP AID: NO
SLEEP PATTERN: DIFFICULTY FALLING ASLEEP;DISTURBED/INTERRUPTED SLEEP;RESTLESSNESS

## 2022-11-10 ASSESSMENT — PATIENT HEALTH QUESTIONNAIRE - PHQ9: SUM OF ALL RESPONSES TO PHQ QUESTIONS 1-9: 18

## 2022-11-10 ASSESSMENT — LIFESTYLE VARIABLES
HOW MANY STANDARD DRINKS CONTAINING ALCOHOL DO YOU HAVE ON A TYPICAL DAY: PATIENT DOES NOT DRINK
HOW MANY STANDARD DRINKS CONTAINING ALCOHOL DO YOU HAVE ON A TYPICAL DAY: PATIENT DOES NOT DRINK
HOW OFTEN DO YOU HAVE A DRINK CONTAINING ALCOHOL: NEVER
HOW OFTEN DO YOU HAVE A DRINK CONTAINING ALCOHOL: NEVER

## 2022-11-10 ASSESSMENT — PAIN - FUNCTIONAL ASSESSMENT: PAIN_FUNCTIONAL_ASSESSMENT: NONE - DENIES PAIN

## 2022-11-10 NOTE — ED NOTES
Behavioral Health Crisis Assessment      Chief Complaint: Pt is a 26 yo female who presents to the crisis unit as a walk-in, pt is on a pink slip by ED doctor due to suicidal ideation with thoughts of strangling herself, psychotic symptoms of seeing demons, also reporting auditory hallucinations of the demons threatening to harm her and to harm others. Mental Status Exam: Alert, oriented x3, unsure of date, pt is developmentally disabled, mood stable, affect is restricted, speech normal in rate flow and volume, eye contact fair, behavior is cooperative, no signs of agitation, thought form somewhat disorganized, reporting auditory and visual hallucinations, denies delusional beliefs, does report some minor paranoia. Legal Status  [] Voluntary:  [x] Involuntary, Issued by:    Gender  [] Male [x] Female [] Transgender  [] Other    Sexual Orientation    [x] Heterosexual [] Homosexual [] Bisexual [] Other    Brief Clinical Summary: Pt is a resident of [facility], a housing option for persons with developmental disabilities, pt reports she does see a psychiatric provider but both she and the staff accompanying her cannot recall the name, hx of admission [address] Lujan, last was 9/26/2022,  hx: Bipolar Disorder, rx hx: Depakote, Zyprexia, Abilify Maintaina injection    Collateral Information: Accompanied by staff from group home    Risk Factors:  Mental health dx: Bipolar Disorder  Hx of in-patient admissions  Experiencing psychotic symptoms    Protective Factors:   In a structured, supervised environment  Out patient provider  Safe and stable housing  Access to     Suicidal Ideations:   [x] Reports:    [x] Past [x] Present   [] Denies    Suicide Attempts:  [x] Reports:   [] Denies    C-SSRS Screening Completed by RN: Current Suicide Risk:  [] No Risk [] Low [] Moderate [] High    Homicidal Ideations  [] Reports:   [] Past [] Present   [x] Denies     Self Injurious/Self Mutilation Behaviors:   [] Reports: [] Past [] Present   [x] Denies    Hallucinations/Delusions   [x] Reports: A/V hallucinations  [] Denies     Substance Use/Alcohol Use/Addiction:   [] Reports:   [x] Denies   [x] SBIRT Screen Complete. Current or Past Substance Abuse Treatment  [] Yes, When and Where:  [x] No    Current or Past Mental Health Treatment:  [x] Yes, When and Where:   [] No    Legal Issues:  []  Yes (Specify)  [x]  No    Access to Weapons:  []  Yes (Specify)  [x]  No    Trauma History  [] Reports:  [x] Denies     Living Situation: In group home    Employment: Not employed    Education Level:     Violence Risk Screening:        Have you ever thought about hurting someone? [x]  No  []  Yes (Ask the questions listed below)   When? Did you follow through with the thoughts? [] No     [] Yes- When and what happened? 2.  Have you ever threatened anyone? [x]  No  []  Yes (Ask the questions listed below)   When and what happened? Have you ever threatened someone with a gun, knife or other weapon? []  No  []  Yes - When and what happened? 2. Have you ever had an order of protection taken out against you? []  Yes [x]  No  3. Have you ever been arrested due to violence? []  Yes [x]  No  4. Have you ever been cruel to animals?  []  Yes [x]  No    After consideration of C-SSRS screening results, C-SSRS assessments, and this professional's assessment the patient's overall suicide risk assessed to be:  [] No Risk  [] Low   [x] Moderate   [] High     [x] Discussed current suicide risk, protective and risk factors with RN and ED Physician     Disposition   [] Home:   [] Outpatient Provider:   [] Crisis Unit:   [x] Inpatient Psychiatric Unit:   [] Other:                    KATIE Verde, SKYLAR  11/10/22 0044
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
## 2022-11-10 NOTE — PLAN OF CARE
Patient has calmed down somewhat. She has showered and is cooperative at this time. Continues to have auditory and visual hallucinations to hurt people or self and still not willing to contract for safety. She does not answer questions about SI/HI. Patient does seem to talk to unseen others. She is alert and oriented x4. She is now pleasant and cooperative with staff. Saying thank you. Problem: Self Harm/Suicidality  Goal: Will have no self-injury during hospital stay  Description: INTERVENTIONS:  1. Q 30 MINUTES: Routine safety checks  2. Q SHIFT & PRN: Assess risk to determine if routine checks are adequate to maintain patient safety  Outcome: Progressing     Problem: Depression  Goal: Will be euthymic at discharge  Description: INTERVENTIONS:  1. Administer medication as ordered  2. Provide emotional support via 1:1 interaction with staff  3. Encourage involvement in milieu/groups/activities  4. Monitor for social isolation  Outcome: Progressing     Problem: Psychosis  Goal: Will report no hallucinations or delusions  Description: INTERVENTIONS:  1. Administer medication as  ordered  2. Assist with reality testing to support increasing orientation  3. Assess if patient's hallucinations or delusions are encouraging self harm or harm to others and intervene as appropriate  Outcome: Progressing     Problem: Anxiety  Goal: Will report anxiety at manageable levels  Description: INTERVENTIONS:  1. Administer medication as ordered  2. Teach and rehearse alternative coping skills  3. Provide emotional support with 1:1 interaction with staff  Outcome: Progressing     Problem: Involuntary Admit  Goal: Will cooperate with staff recommendations and doctor's orders and will demonstrate appropriate behavior  Description: INTERVENTIONS:  1. Treat underlying conditions and offer medication as ordered  2. Educate regarding involuntary admission procedures and rules  3.  Contain excessive/inappropriate behavior per unit and hospital policies  Outcome: Progressing
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
## 2022-11-10 NOTE — PROGRESS NOTES
Spoke with Harry Garza [phone] at [facility] update given. Updated med list faxed to unit and updated in admission documentation.

## 2022-11-10 NOTE — PROGRESS NOTES
Shared goal for the day as to walk. Patient did come to education group however did not stay long. Patient stood up and bent over to  plastic knife on floor. Picked up and walk away with knife. Notified nursing staff of this. This staff along with 2 others confronted patient and asked for knife back. Patient stated she thru it in the trash. Patient asked to show us her socks, or we can do it for her. Patient stated I can do it and then pulled knife from socks and handed to staff. Notified RN to make appropriate changes to patients plan for the day, ie place pt on finger foods and in private room. Recreation assessment completed.
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
## 2022-11-10 NOTE — PROGRESS NOTES
[facility]  Initial Interdisciplinary Treatment Plan NOTE    Review Date & Time: 11/10/2022     Patient was in treatment team    Admission Type:   Admission Type: Involuntary    Reason for admission:  Reason for Admission:  \"To get help\"      Estimated Length of Stay Update:  3-5 days  Estimated Discharge Date Update: 11/14/2022    EDUCATION:   Learner Progress Toward Treatment Goals: Reviewed group plan and strategies and Reviewed signs, symptoms and risk of self harm and violent behavior    Method: Small group    Outcome: Needs reinforcement and No evidence of Learning    PATIENT GOALS: goal to walk    PLAN/TREATMENT RECOMMENDATIONS UPDATE: will support patient in setting and obtaining both short term and long term goals while on the unit    GOALS UPDATE:   Time frame for Short-Term Goals: 3-5 days    Carlyle Rodriguez RN

## 2022-11-10 NOTE — PROGRESS NOTES
Patient pacing the roger talking to unseen others. She states auditory hallucinations are telling her to hurt herself or someone else. She would not contract for safety. Stating she is going to hurt someone. Patient would nott stay in her room. Medicated with PRN meds for agitation. Did stay in room after being medicated. Will continue to safety rounds.
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
## 2022-11-10 NOTE — ED NOTES
Patient up ambulating to and from bathroom stated that she felt anxious and unsafe. Reassured her that she is safe here and asked if she needed something to help her relax a little. Spoke with MD and orders where placed. Caregiver from group home remains at bedside.       Kait Lujan RN  11/09/22 4805

## 2022-11-10 NOTE — ED NOTES
Spoke with Dr Tito Maier he will accept patient on 7S for inpatient care      Rose Diallo RN  11/10/22 0124
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
## 2022-11-11 LAB
BILIRUBIN URINE: NEGATIVE
BLOOD, URINE: NEGATIVE
CLARITY: CLEAR
COLOR: YELLOW
EKG ATRIAL RATE: 96 BPM
EKG P AXIS: 56 DEGREES
EKG P-R INTERVAL: 150 MS
EKG Q-T INTERVAL: 320 MS
EKG QRS DURATION: 80 MS
EKG QTC CALCULATION (BAZETT): 404 MS
EKG R AXIS: 44 DEGREES
EKG T AXIS: -6 DEGREES
EKG VENTRICULAR RATE: 96 BPM
GLUCOSE URINE: NEGATIVE MG/DL
KETONES, URINE: NEGATIVE MG/DL
LEUKOCYTE ESTERASE, URINE: NEGATIVE
NITRITE, URINE: NEGATIVE
PH UA: 7 (ref 5–9)
PROTEIN UA: NEGATIVE MG/DL
SPECIFIC GRAVITY UA: 1.01 (ref 1–1.03)
UROBILINOGEN, URINE: 0.2 E.U./DL

## 2022-11-11 PROCEDURE — 6370000000 HC RX 637 (ALT 250 FOR IP): Performed by: PSYCHIATRY & NEUROLOGY

## 2022-11-11 PROCEDURE — 6360000002 HC RX W HCPCS: Performed by: NURSE PRACTITIONER

## 2022-11-11 PROCEDURE — 6360000002 HC RX W HCPCS: Performed by: PSYCHIATRY & NEUROLOGY

## 2022-11-11 PROCEDURE — 1240000000 HC EMOTIONAL WELLNESS R&B

## 2022-11-11 PROCEDURE — 81003 URINALYSIS AUTO W/O SCOPE: CPT

## 2022-11-11 PROCEDURE — 6370000000 HC RX 637 (ALT 250 FOR IP): Performed by: NURSE PRACTITIONER

## 2022-11-11 PROCEDURE — 99232 SBSQ HOSP IP/OBS MODERATE 35: CPT | Performed by: NURSE PRACTITIONER

## 2022-11-11 PROCEDURE — 93010 ELECTROCARDIOGRAM REPORT: CPT | Performed by: INTERNAL MEDICINE

## 2022-11-11 RX ORDER — NICOTINE 21 MG/24HR
1 PATCH, TRANSDERMAL 24 HOURS TRANSDERMAL DAILY
Qty: 30 PATCH | Refills: 0
Start: 2022-11-12 | End: 2022-12-12

## 2022-11-11 RX ADMIN — ACETAMINOPHEN 650 MG: 325 TABLET, FILM COATED ORAL at 08:53

## 2022-11-11 RX ADMIN — DIVALPROEX SODIUM 500 MG: 500 TABLET, DELAYED RELEASE ORAL at 08:59

## 2022-11-11 RX ADMIN — HALOPERIDOL LACTATE 5 MG: 5 INJECTION, SOLUTION INTRAMUSCULAR at 10:56

## 2022-11-11 RX ADMIN — LITHIUM CARBONATE 300 MG: 300 CAPSULE, GELATIN COATED ORAL at 12:16

## 2022-11-11 RX ADMIN — LEVETIRACETAM 1000 MG: 500 TABLET, FILM COATED ORAL at 09:02

## 2022-11-11 RX ADMIN — OLANZAPINE 10 MG: 5 TABLET, ORALLY DISINTEGRATING ORAL at 09:02

## 2022-11-11 RX ADMIN — HYDROXYZINE PAMOATE 50 MG: 50 CAPSULE ORAL at 13:30

## 2022-11-11 RX ADMIN — LORAZEPAM 2 MG: 2 INJECTION INTRAMUSCULAR; INTRAVENOUS at 10:56

## 2022-11-11 RX ADMIN — DIVALPROEX SODIUM 500 MG: 500 TABLET, DELAYED RELEASE ORAL at 12:16

## 2022-11-11 RX ADMIN — LITHIUM CARBONATE 300 MG: 300 CAPSULE, GELATIN COATED ORAL at 08:59

## 2022-11-11 ASSESSMENT — PAIN DESCRIPTION - LOCATION: LOCATION: ABDOMEN;BACK

## 2022-11-11 ASSESSMENT — PAIN SCALES - GENERAL
PAINLEVEL_OUTOF10: 5
PAINLEVEL_OUTOF10: 0
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
## 2022-11-11 NOTE — PLAN OF CARE
Advanced out of restraints  to open seclusion / quiet room and sitting in chair. Continues to speak of not feeling safe and hearing constant distressing command voices. Procrastinating re taking prn vistaril for \"10\" level of anxiety. Current comments and presentation w some flavor of manipulation. 1:1  continues. Out of restraints. Back into private room as of 2p. Problem: Self Harm/Suicidality  Goal: Will have no self-injury during hospital stay  Description: INTERVENTIONS:  1. Q 30 MINUTES: Routine safety checks  2. Q SHIFT & PRN: Assess risk to determine if routine checks are adequate to maintain patient safety  11/11/2022 1332 by Rafael Núñez RN  Outcome: Progressing  11/11/2022 1143 by Rafael Núñez RN  Outcome: Not Progressing  11/11/2022 0522 by Bora Dickey RN  Outcome: Progressing     Problem: Psychosis  Goal: Will report no hallucinations or delusions  Description: INTERVENTIONS:  1. Administer medication as  ordered  2. Assist with reality testing to support increasing orientation  3. Assess if patient's hallucinations or delusions are encouraging self harm or harm to others and intervene as appropriate  11/11/2022 1332 by Rafael Núñez RN  Outcome: Not Progressing  11/11/2022 1143 by Rafael Núñez RN  Outcome: Not Progressing  11/11/2022 0522 by Bora Diceky RN  Outcome: Progressing     Problem: Anxiety  Goal: Will report anxiety at manageable levels  Description: INTERVENTIONS:  1. Administer medication as ordered  2. Teach and rehearse alternative coping skills  3.  Provide emotional support with 1:1 interaction with staff  11/11/2022 1143 by Rafael Núñez RN  Outcome: Not Progressing  11/11/2022 0522 by Bora Dickey RN  Outcome: Progressing

## 2022-11-11 NOTE — PROGRESS NOTES
[facility] 45 NOTE     11/11/2022     Patient was seen and examined in person, Chart reviewed   Patient's case discussed with staff/team    Chief Complaint: \"I am not doing good. \"    Interim History: Patient up on the unit this morning she states she is \"not doing good. \"  Her affect is incongruent she is bright pleasant smiling social with peers. She then had a period of acute agitation she required being placed in seclusion and restraint she was apparently tried to eat a pencil banging on the walls.   She is impulsive and easily agitated no insight or judgment reports auditory visual hallucinations but does not appear to be internally preoccupied      Appetite:   [x] Normal/Unchanged  [] Increased  [] Decreased      Sleep:       [x] Normal/Unchanged  [] Fair       [] Poor              Energy:    [x] Normal/Unchanged  [] Increased  [] Decreased        SI [] Present  [x] Absent    HI  []Present  [x] Absent     Aggression:  [] yes  [x] no    Patient is [x] able  [] unable to CONTRACT FOR SAFETY     PAST MEDICAL/PSYCHIATRIC HISTORY:   Past Medical History:   Diagnosis Date    ADHD     Autism disorder     Colitis     [facility] ([facility]     Seizures ([facility]        FAMILY/SOCIAL HISTORY:  Family History   Problem Relation Age of Onset    Diabetes Mother     Other Mother         lupus     Social History     Socioeconomic History    Marital status: Single     Spouse name: Not on file    Number of children: Not on file    Years of education: Not on file    Highest education level: Not on file   Occupational History    Not on file   Tobacco Use    Smoking status: Never    Smokeless tobacco: Never   Vaping Use    Vaping Use: Never used   Substance and Sexual Activity    Alcohol use: No    Drug use: No    Sexual activity: Never   Other Topics Concern    Not on file   Social History Narrative    Not on file     Social Determinants of Health     Financial Resource Strain: Not on file   Food Insecurity: Not on file Transportation Needs: Not on file   Physical Activity: Not on file   Stress: Not on file   Social Connections: Not on file   Intimate Partner Violence: Not on file   Housing Stability: Not on file           ROS:  [x] All negative/unchanged except if checked.  Explain positive(checked items) below:  [] Constitutional  [] Eyes  [] Ear/Nose/Mouth/Throat  [] Respiratory  [] CV  [] GI  []   [] Musculoskeletal  [] Skin/Breast  [] Neurological  [] Endocrine  [] Heme/Lymph  [] Allergic/Immunologic    Explanation:     MEDICATIONS:    Current Facility-Administered Medications:     acetaminophen (TYLENOL) tablet 650 mg, 650 mg, Oral, Q6H PRN, Robi Rose MD, 650 mg at 11/11/22 0853    magnesium hydroxide (MILK OF MAGNESIA) 400 MG/5ML suspension 30 mL, 30 mL, Oral, Daily PRN, Robi Rose MD    nicotine (NICODERM CQ) 21 MG/24HR 1 patch, 1 patch, TransDERmal, Daily, Robi Rose MD    aluminum & magnesium hydroxide-simethicone (MAALOX) 200-200-20 MG/5ML suspension 30 mL, 30 mL, Oral, PRN, Robi Rose MD    hydrOXYzine pamoate (VISTARIL) capsule 50 mg, 50 mg, Oral, TID PRN, Robi Rose MD, 50 mg at 11/11/22 1330    haloperidol (HALDOL) tablet 5 mg, 5 mg, Oral, Q6H PRN, 5 mg at 11/10/22 0157 **OR** haloperidol lactate (HALDOL) injection 5 mg, 5 mg, IntraMUSCular, Q6H PRN, Robi Rose MD, 5 mg at 11/11/22 1056    melatonin tablet 3 mg, 3 mg, Oral, Nightly, Robi Rose MD, 3 mg at 11/10/22 2108    LORazepam (ATIVAN) injection 2 mg, 2 mg, IntraMUSCular, I7T PRN, Claryce Semen RALPH Thomas CNP, 2 mg at 11/11/22 1056    [START ON 12/3/2022] ARIPiprazole ER (ABILIFY MAINTENA) 400 mg, 400 mg, IntraMUSCular, Q30 Days, Maggy B Dellick, APRN - CNP    divalproex (DEPAKOTE) DR tablet 500 mg, 500 mg, Oral, TID, RALPH Hoffmann CNP, 656 mg at 11/11/22 1216    levETIRAcetam (KEPPRA) tablet 1,000 mg, 1,000 mg, Oral, BID, RALPH Hoffmann - CNP, 4,691 mg at 11/11/22 0902    lithium capsule 300 mg, 300 mg, Oral, TID WC, Jennifer Thomas, APRN - CNP, 562 mg at 11/11/22 1216    OLANZapine zydis (ZYPREXA) disintegrating tablet 10 mg, 10 mg, Oral, BID, Jennifer Thomas APRCELSA - CNP, 10 mg at 11/11/22 3531      Examination:  BP (!) 140/75   Pulse 94   Temp 97.5 °F (36.4 °C)   Resp 16   Ht 5' 4\" (1.626 m)   Wt (!) 350 lb (158.8 kg)   SpO2 100%   BMI 60.08 kg/m²   Gait - steady  Medication side effects(SE): Denies    Mental Status Examination:    Level of consciousness:  within normal limits   Appearance:  fair grooming and fair hygiene  Behavior/Motor:  no abnormalities noted  Attitude toward examiner:  cooperative  Speech:  spontaneous, normal rate and normal volume   Mood: \" I am not doing good. \"  Affect: Mood incongruent Bright appropriate and pleasant  Thought processes: Linear thought flight of ideas loose associations  Thought content: Reports auditory hallucinations and visual hallucinations but not appear to be internally stimulated internally preoccupied delusions or other perceptual normalities.   Denies SI/HI intent or plan  Cognition:  oriented to person, place, and time   Concentration intact  Insight poor  Judgement poor    ASSESSMENT:   Patient symptoms are:  [] Well controlled  [] Improving  [x] Worsening  [] No change      Diagnosis:   Principal Problem:    Severe manic bipolar 1 disorder with psychotic behavior ([facility]  Active Problems:    Intellectual disability    Borderline personality disorder ([facility]  Resolved Problems:    Suicidal ideation      LABS:    Recent Labs     11/09/22  1407   WBC 7.4   HGB 11.9        Recent Labs     11/09/22  1407      K 3.8      CO2 24   BUN 11   CREATININE 1.0   GLUCOSE 115*     Recent Labs     11/09/22  1407   BILITOT 0.2   ALKPHOS 63   AST 22   ALT 16     Lab Results   Component Value Date/Time    LABAMPH NOT DETECTED 11/09/2022 02:07 PM    BARBSCNU NOT DETECTED 11/09/2022 02:07 PM    LABBENZ NOT DETECTED 11/09/2022 02:07 PM    LABMETH NOT DETECTED 11/09/2022 02:07 PM    OPIATESCREENURINE NOT DETECTED 11/09/2022 02:07 PM    PHENCYCLIDINESCREENURINE NOT DETECTED 11/09/2022 02:07 PM    ETOH <10 11/09/2022 02:07 PM     Lab Results   Component Value Date/Time    TSH 2.140 06/23/2022 05:19 AM     Lab Results   Component Value Date    LITHIUM 0.27 (L) 06/23/2022     Lab Results   Component Value Date    VALPROATE 66 11/09/2022         Treatment Plan:  Reviewed current Medications with the patient. Risks, benefits, side effects, drug-to-drug interactions and alternatives to treatment were discussed. Collateral information:   CD evaluation  Encourage patient to attend group and other milieu activities.   Discharge planning discussed with the patient and treatment team.    Continue Abilify Maintena 400 mg IM every 30 days  Depakote 500 mg 3 times daily  Lithium 300 mg 3 times daily  Zyprexa 10 mg twice daily    PSYCHOTHERAPY/COUNSELING:  [x] Therapeutic interview  [x] Supportive  [] CBT  [] Ongoing  [] Other    [x] Patient continues to need, on a daily basis, active treatment furnished directly by or requiring the supervision of inpatient psychiatric personnel      Anticipated Length of stay: 3 to 7 days based on stability            Electronically signed by RALPH Duarte CNP on 78/15/9461 at 2:41 PM

## 2022-11-11 NOTE — PLAN OF CARE
At [id number], pt required special seclusion room then 4 pt restraints for out of control beh. Repeated themes of wanting to get a knife and kill herself. \"I want to die! \"      Despite  re more pos coping and better problem solving, pt looks at wall and states she sees demons. States they are telling her to stab others \"for fun. \"   \"He's got me and he's using me. \"       Vss. Charted. Ice water sips. Ongoing supportive care, attempts to guide to more pos thinking. Not successful thus far. Problem: Self Harm/Suicidality  Goal: Will have no self-injury during hospital stay  Description: INTERVENTIONS:  1. Q 30 MINUTES: Routine safety checks  2. Q SHIFT & PRN: Assess risk to determine if routine checks are adequate to maintain patient safety  11/11/2022 1143 by Iraida Diehl RN  Outcome: Not Progressing  11/11/2022 0522 by Manuel Brooke RN  Outcome: Progressing     Problem: Psychosis  Goal: Will report no hallucinations or delusions  Description: INTERVENTIONS:  1. Administer medication as  ordered  2. Assist with reality testing to support increasing orientation  3. Assess if patient's hallucinations or delusions are encouraging self harm or harm to others and intervene as appropriate  11/11/2022 1143 by Iraida Diehl RN  Outcome: Not Progressing  11/11/2022 0522 by Manuel Brooke RN  Outcome: Progressing     Problem: Anxiety  Goal: Will report anxiety at manageable levels  Description: INTERVENTIONS:  1. Administer medication as ordered  2. Teach and rehearse alternative coping skills  3.  Provide emotional support with 1:1 interaction with staff  11/11/2022 1143 by Iraida Diehl RN  Outcome: Not Progressing  11/11/2022 0522 by Manuel Brooke RN  Outcome: Progressing

## 2022-11-11 NOTE — PLAN OF CARE
Although released fr restraints at 1315 and open seclusion at 5 and being escorted to her room ( and being placed informally on 1:1 monitoring ) , she has peeled off 3 sm slivers of wood ( giving them to us and smiling ) saying she intended to cut herself and stab all of us. Speaking of also wanting to strangle herself. Is also turning her back on us  ( attempting to be secretive ) and fidgeting w her wristband. No injuries noted on her wrists. Case d/w mgt staff. Declines taking po prn. States she does not care if she is IMed or placed back in 4 pts. Problem: Self Harm/Suicidality  Goal: Will have no self-injury during hospital stay  Description: INTERVENTIONS:  1. Q 30 MINUTES: Routine safety checks  2. Q SHIFT & PRN: Assess risk to determine if routine checks are adequate to maintain patient safety  11/11/2022 1521 by Josefina Redmond RN  Outcome: Not Progressing  11/11/2022 1332 by Josefina Redmond RN  Outcome: Progressing  11/11/2022 1143 by Josefina Redmond RN  Outcome: Not Progressing  11/11/2022 0522 by Cayetano Cadet RN  Outcome: Progressing     Problem: Psychosis  Goal: Will report no hallucinations or delusions  Description: INTERVENTIONS:  1. Administer medication as  ordered  2. Assist with reality testing to support increasing orientation  3. Assess if patient's hallucinations or delusions are encouraging self harm or harm to others and intervene as appropriate  11/11/2022 1332 by Josefina Redmond RN  Outcome: Not Progressing  11/11/2022 1143 by Josefina Redmond RN  Outcome: Not Progressing  11/11/2022 0522 by Cayetano Cadet RN  Outcome: Progressing     Problem: Anxiety  Goal: Will report anxiety at manageable levels  Description: INTERVENTIONS:  1. Administer medication as ordered  2. Teach and rehearse alternative coping skills  3.  Provide emotional support with 1:1 interaction with staff  11/11/2022 1521 by Josefina Redmond RN  Outcome: Not Progressing  11/11/2022 1143 by Vance Almaraz RN  Outcome: Not Progressing  11/11/2022 0522 by Baljeet De Leon RN  Outcome: Progressing

## 2022-11-11 NOTE — CARE COORDINATION
JACOB called [facility] Diandra Atkinson [phone] to discuss pt's follow up appointments. JACOB left a message requesting a call back.

## 2022-11-11 NOTE — CARE COORDINATION
JACOB called [facility] [phone] who stated that the pt is active with her PCP currently but the pt will be going [facility] [address]. SW informed [facility] that the pt will be discharged today. JACOB called [facility] and spoke with Rai Cameron who stated that the pt had an assessment completed on October 19 th.  Rai Cameron stated that the pt is at [facility] and she can be put on a rotation to been seen by the provider. JACOB met with pt to discuss discharge plan. Pt was seen outside of group and she stated that she is not doing good. Pt stated that she is having thoughts about wanting to strangle people but it was not towards anyone specific. Pt then reported that she is hearing noises and seeing people that are not there. Pt then resorted back to stating that she wants to strangle someone and she is having intrusive thoughts. Pt then stated that she wants to cut her wrists and harm herself. Pt denied having any skills in place to help with her thoughts. Pt reported that deep breathing does not help. Pt then punched a wall and returned back to her room. JACOB informed RN/ NP of this encounter.

## 2022-11-11 NOTE — CARE COORDINATION
SW called [facility] Jeffeyr Rasheed [phone], SW left a message requesting a call back.

## 2022-11-11 NOTE — PROGRESS NOTES
Pt positive for SI but contracts for safety. Pt denies HI. Pt positive for AVH. Pt states she sees and hears demons telling her to cut herself. PT stated she would not do so to this nurse. Tearful. Pt is incongruent. Brightened with peers. Sad and depressed with staff. NO self harm this shift. Pt waved and smiled at this nurse rowdy. Will continue to monitor.

## 2022-11-12 PROCEDURE — 6360000002 HC RX W HCPCS: Performed by: PSYCHIATRY & NEUROLOGY

## 2022-11-12 PROCEDURE — 1240000000 HC EMOTIONAL WELLNESS R&B

## 2022-11-12 PROCEDURE — 6370000000 HC RX 637 (ALT 250 FOR IP): Performed by: PSYCHIATRY & NEUROLOGY

## 2022-11-12 PROCEDURE — 99232 SBSQ HOSP IP/OBS MODERATE 35: CPT | Performed by: NURSE PRACTITIONER

## 2022-11-12 PROCEDURE — 6360000002 HC RX W HCPCS: Performed by: NURSE PRACTITIONER

## 2022-11-12 PROCEDURE — 6370000000 HC RX 637 (ALT 250 FOR IP): Performed by: NURSE PRACTITIONER

## 2022-11-12 PROCEDURE — 6360000002 HC RX W HCPCS

## 2022-11-12 RX ORDER — DIPHENHYDRAMINE HYDROCHLORIDE 50 MG/ML
INJECTION INTRAMUSCULAR; INTRAVENOUS
Status: COMPLETED
Start: 2022-11-12 | End: 2022-11-12

## 2022-11-12 RX ORDER — DIPHENHYDRAMINE HYDROCHLORIDE 50 MG/ML
50 INJECTION INTRAMUSCULAR; INTRAVENOUS EVERY 6 HOURS PRN
Status: DISCONTINUED | OUTPATIENT
Start: 2022-11-12 | End: 2022-11-15 | Stop reason: HOSPADM

## 2022-11-12 RX ORDER — LORAZEPAM 2 MG/ML
2 INJECTION INTRAMUSCULAR EVERY 6 HOURS PRN
Status: DISCONTINUED | OUTPATIENT
Start: 2022-11-12 | End: 2022-11-15 | Stop reason: HOSPADM

## 2022-11-12 RX ADMIN — MELATONIN 3 MG ORAL TABLET 3 MG: 3 TABLET ORAL at 21:20

## 2022-11-12 RX ADMIN — DIPHENHYDRAMINE HYDROCHLORIDE 50 MG: 50 INJECTION, SOLUTION INTRAMUSCULAR; INTRAVENOUS at 16:36

## 2022-11-12 RX ADMIN — HALOPERIDOL LACTATE 5 MG: 5 INJECTION, SOLUTION INTRAMUSCULAR at 08:27

## 2022-11-12 RX ADMIN — DIPHENHYDRAMINE HYDROCHLORIDE: 50 INJECTION, SOLUTION INTRAMUSCULAR; INTRAVENOUS at 11:47

## 2022-11-12 RX ADMIN — LEVETIRACETAM 1000 MG: 500 TABLET, FILM COATED ORAL at 08:44

## 2022-11-12 RX ADMIN — HALOPERIDOL LACTATE 5 MG: 5 INJECTION, SOLUTION INTRAMUSCULAR at 16:35

## 2022-11-12 RX ADMIN — LORAZEPAM 2 MG: 2 INJECTION INTRAMUSCULAR; INTRAVENOUS at 16:34

## 2022-11-12 RX ADMIN — LORAZEPAM 2 MG: 2 INJECTION INTRAMUSCULAR; INTRAVENOUS at 10:45

## 2022-11-12 ASSESSMENT — PAIN SCALES - GENERAL: PAINLEVEL_OUTOF10: 0

## 2022-11-12 NOTE — PROGRESS NOTES
Pt positive for SI but contracts for safety for the whole shift. Pt spoke with this nurse stating she wanted to cut herself and hurt somebody. Pt stated she wanted to punch the wall. Pt also stated she had voices that tell her to self harm and sees demons in the corner of the room. Able to get the pt to smile and encouraged her to be on the unit playing games with peers. No self harm this shift. No outbursts thus far. Pt stated she is not taking her medication. \"I should do it but I won't. I don't want to but I should take take it. \" Poor eye contact. Incongruent as she smiles and laughing with peers. Pt stated \"I won't do nothin for you. You'll see. I won't hurt myself. \" Pt pinky swore and smiled at this nurse. Pt able to maintain control. Encouraged medications. Encouraged groups. Will continue to monitor.

## 2022-11-12 NOTE — PROGRESS NOTES
tobacco: Never   Vaping Use    Vaping Use: Never used   Substance and Sexual Activity    Alcohol use: No    Drug use: No    Sexual activity: Never   Other Topics Concern    Not on file   Social History Narrative    Not on file     Social Determinants of Health     Financial Resource Strain: Not on file   Food Insecurity: Not on file   Transportation Needs: Not on file   Physical Activity: Not on file   Stress: Not on file   Social Connections: Not on file   Intimate Partner Violence: Not on file   Housing Stability: Not on file           ROS:  [x] All negative/unchanged except if checked.  Explain positive(checked items) below:  [] Constitutional  [] Eyes  [] Ear/Nose/Mouth/Throat  [] Respiratory  [] CV  [] GI  []   [] Musculoskeletal  [] Skin/Breast  [] Neurological  [] Endocrine  [] Heme/Lymph  [] Allergic/Immunologic    Explanation:     MEDICATIONS:    Current Facility-Administered Medications:     diphenhydrAMINE (BENADRYL) injection 50 mg, 50 mg, IntraVENous, Q6H PRN **AND** LORazepam (ATIVAN) injection 2 mg, 2 mg, IntraMUSCular, Q6H PRN, Lawrance Prude, APRN - CNP, 2 mg at 11/12/22 1045    acetaminophen (TYLENOL) tablet 650 mg, 650 mg, Oral, Q6H PRN, Ariane Clayton MD, 650 mg at 11/11/22 0853    magnesium hydroxide (MILK OF MAGNESIA) 400 MG/5ML suspension 30 mL, 30 mL, Oral, Daily PRN, Ariane Clayton MD    nicotine (NICODERM CQ) 21 MG/24HR 1 patch, 1 patch, TransDERmal, Daily, Ariane Clayton MD    aluminum & magnesium hydroxide-simethicone (MAALOX) 200-200-20 MG/5ML suspension 30 mL, 30 mL, Oral, PRN, Ariane Clayton MD    hydrOXYzine pamoate (VISTARIL) capsule 50 mg, 50 mg, Oral, TID PRN, Ariane Clayton MD, 50 mg at 11/11/22 1330    haloperidol (HALDOL) tablet 5 mg, 5 mg, Oral, Q6H PRN, 5 mg at 11/10/22 0157 **OR** haloperidol lactate (HALDOL) injection 5 mg, 5 mg, IntraMUSCular, Q6H PRN, Ariane Clayton MD, 5 mg at 11/12/22 0827    melatonin tablet 3 mg, 3 mg, Oral, Nightly, Lauri Byron MD Levi, 3 mg at 11/10/22 2108    [START ON 12/3/2022] ARIPiprazole ER (ABILIFY MAINTENA) 400 mg, 400 mg, IntraMUSCular, Q30 Days, RALPH Ibarra CNP    divalproex (DEPAKOTE) DR tablet 500 mg, 500 mg, Oral, TID, RALPH Patel - CNP, 801 mg at 11/11/22 1216    levETIRAcetam (KEPPRA) tablet 1,000 mg, 1,000 mg, Oral, BID, RALPH Patel - CNP, 2,059 mg at 11/12/22 0844    lithium capsule 300 mg, 300 mg, Oral, TID WC, RALPH Patel CNP, 483 mg at 11/11/22 1216    OLANZapine zydis (ZYPREXA) disintegrating tablet 10 mg, 10 mg, Oral, BID, RALPH Patel CNP, 10 mg at 11/11/22 0902      Examination:  BP (!) 163/92   Pulse (!) 102   Temp 98.9 °F (37.2 °C)   Resp 15   Ht 5' 4\" (1.626 m)   Wt (!) 350 lb (158.8 kg)   SpO2 100%   BMI 60.08 kg/m²   Gait - steady  Medication side effects(SE): Denies    Mental Status Examination:    Level of consciousness:  within normal limits   Appearance:  fair grooming and fair hygiene  Behavior/Motor:  no abnormalities noted  Attitude toward examiner:  cooperative  Speech:  spontaneous, normal rate and normal volume   Mood: \" I am not doing good. \"  Affect: Mood incongruent Bright appropriate and pleasant  Thought processes: Linear thought flight of ideas loose associations  Thought content: Reports auditory hallucinations and visual hallucinations but not appear to be internally stimulated internally preoccupied delusions or other perceptual normalities.   Denies SI/HI intent or plan  Cognition:  oriented to person, place, and time   Concentration intact  Insight poor  Judgement poor    ASSESSMENT:   Patient symptoms are:  [] Well controlled  [] Improving  [x] Worsening  [] No change      Diagnosis:   Principal Problem:    Severe manic bipolar 1 disorder with psychotic behavior ([facility]  Active Problems:    Intellectual disability    Borderline personality disorder ([facility]  Resolved Problems:    Suicidal ideation      LABS:    Recent Labs 11/09/22  1407   WBC 7.4   HGB 11.9        Recent Labs     11/09/22  1407      K 3.8      CO2 24   BUN 11   CREATININE 1.0   GLUCOSE 115*     Recent Labs     11/09/22  1407   BILITOT 0.2   ALKPHOS 63   AST 22   ALT 16     Lab Results   Component Value Date/Time    LABAMPH NOT DETECTED 11/09/2022 02:07 PM    BARBSCNU NOT DETECTED 11/09/2022 02:07 PM    LABBENZ NOT DETECTED 11/09/2022 02:07 PM    LABMETH NOT DETECTED 11/09/2022 02:07 PM    OPIATESCREENURINE NOT DETECTED 11/09/2022 02:07 PM    PHENCYCLIDINESCREENURINE NOT DETECTED 11/09/2022 02:07 PM    ETOH <10 11/09/2022 02:07 PM     Lab Results   Component Value Date/Time    TSH 2.140 06/23/2022 05:19 AM     Lab Results   Component Value Date    LITHIUM 0.27 (L) 06/23/2022     Lab Results   Component Value Date    VALPROATE 66 11/09/2022         Treatment Plan:  Reviewed current Medications with the patient. Risks, benefits, side effects, drug-to-drug interactions and alternatives to treatment were discussed. Collateral information:   CD evaluation  Encourage patient to attend group and other milieu activities.   Discharge planning discussed with the patient and treatment team.    Continue Abilify Maintena 400 mg IM every 30 days  Depakote 500 mg 3 times daily  Lithium 300 mg 3 times daily  Zyprexa 10 mg twice daily    PSYCHOTHERAPY/COUNSELING:  [x] Therapeutic interview  [x] Supportive  [] CBT  [] Ongoing  [] Other    [x] Patient continues to need, on a daily basis, active treatment furnished directly by or requiring the supervision of inpatient psychiatric personnel      Anticipated Length of stay: 3 to 7 days based on stability            Electronically signed by RALPH Whitman CNP on 11/12/2022 at 1:50 PM

## 2022-11-12 NOTE — PROGRESS NOTES
Patient attempting to hit her head on the wall in her room. Patient states that she is hearing voices telling her to do so. Patient states that she has to choke someone. Patient states that the [employer]y Bolk is telling her to choke someone. Pt states that she wants to because the Devil and her think this is fun. Patient attempted to choke RN after asking and being told no, the she is not allowed to choke the RN. Patient then placed in 4pt restraints for patient and staff/peer safety.

## 2022-11-12 NOTE — PROGRESS NOTES
Post Restraint and Seclusion Patient Debriefing    Did debriefing occur? yes, Discussed restraint event with patient    If No, why not? [] Patient refused  [] Patient is unavailable for debriefing due to:  [] Patient debriefing not completed due to clinical contraindication of:    What events led to the seclusion/restraint incident? Patient attempting to harm self and others    Did being restrained or in seclusion help you regain control of your behavior? no      Did you feel safe while you were in restraint or seclusion:      [] Very Safe  [x] Safe  [] Somewhat Safe  [] Not Safe     Did you have the chance to gain control of your behavior before you were secluded or restrained? yes, I was unable    If Yes, how:    [x] Offered Medication  [x] Talked with staff  [] Given Time Out      [x] Offered alternatives to restraint/seclusion     During the restraint or seclusion process were you offered medicine to help you gain control? yes, Medications given      Were your physical and emotional needs met, and your privacy rights addressed while you were in restraints/seclusion? yes,       How can we assist you in remaining restraint or seclusion free in the future? Help with the voices      Is there anything else you would like to share regarding this restraint/seclusion episode? Patient consented to family or significant other to participate in debriefing no    Patient's guardian participated in debriefing (when applicable) no    Patient's guardian unable to participate in debriefing (when applicable) no    Staff:   Were modifications to the treatment plan completed? yes, patient behavior plan implemented

## 2022-11-12 NOTE — PROGRESS NOTES
Patient denies SI, HI at this time. Pt states that she is hearing voices. Asked if she could take injection for this, as she believes the pills make it worse. Prn medications given as ordered. Pt refused oral medication at this time.

## 2022-11-12 NOTE — PROGRESS NOTES
BEHAVIORAL SERVICES: One - Hour In- Person Review  For Management of Violent or Self - Destructive Behavior    Seclusion/Restraint:  4pt restraint    Reason for Intervention: patient a threat to self and others    Response to Intervention:  patient agitated    Medical Record reviewed and discussed precipitating events/behaviors with RN initiating Intervention:  Yes    Patient Medical Status:  Vital Signs: pt refused  Respiratory Status: Pt refused  Circulatory Status: Pt refused  Skin Integrity: WDL    Orientation: oriented to person, place, and time/date    Mood/Affect: sad    Speech: Slowed and Soft    Thought Content: suicidal    Thought Processes: Circumstantial    Rationale for continued use of intervention: Patient threatening to hurt herself    Rationale for discontinuing intervention: Patient is able to:      One Hour Review Evaluation Physician Notification:  Complete

## 2022-11-12 NOTE — PLAN OF CARE
Patient states that she I hearing voices and seeing the devil. Patient states that they have gotten better, but they were telling her what to do when she was in restraints. Patient states that she was also having thoughts of harming herself, but currently is not. Will continue to observe and support.

## 2022-11-12 NOTE — PROGRESS NOTES
Briefly attended community meeting, sitting with peers. Left early when structured group started. Unwilling to share daily goal, nor interested in water color craft/activity.

## 2022-11-12 NOTE — PROGRESS NOTES
Pt approached this nurse stating she had a headache. PRN given. Pt then voiced to this nurse that she wants to choke somebody on the unit or punch the wall. Then stated to Moe Stewart that she wanted to hurt somebody on the unit. Pt stated she can't control the voices and that they want her to do \"things. \" Emotional support given. Pt was able to be distracted walking the unit and talking with staff. Pt then came to this nurse and stated it was \"really bad thoughts. \" This nurse escorted the pt to her room. Encouraged pt to take her PO medications. Pt refusing d/t the voices not wanting her to take them. Pt stated she wanted to hurt her self or others. PRNs given at [phone]. Pt resting comfortably. Will continue to monitor.

## 2022-11-12 NOTE — PROGRESS NOTES
BEHAVIORAL SERVICES: One - Hour In- Person Review  For Management of Violent or Self - Destructive Behavior    Seclusion/Restraint:  4 point restraint    Reason for Intervention: harm to self and others    Response to Intervention:  patient continues to threaten harm to self and other    Medical Record reviewed and discussed precipitating events/behaviors with RN initiating Intervention:  Yes    Patient Medical Status:  Vital Signs: T 97.6 /57 P 92 SPO2 99  Respiratory Status: WNL  Circulatory Status: WNL  Skin Integrity:WNL    Orientation: oriented to person, place, time/date, and situation    Mood/Affect: anxious, irritable, and sad    Speech: Slowed    Thought Content: suicidal and hurt others    Thought Processes: Circumstantial    Rationale for continued use of intervention: patient remain at risk to harm self or others. Continues to report that auditory hallucinations tell her to hurt self or others    Rationale for discontinuing intervention: Patient is able to: Patient able to control behavioral outburst and be redirectable.     One Hour Review Evaluation Physician Notification:  yes

## 2022-11-13 PROCEDURE — 99232 SBSQ HOSP IP/OBS MODERATE 35: CPT | Performed by: NURSE PRACTITIONER

## 2022-11-13 PROCEDURE — 6370000000 HC RX 637 (ALT 250 FOR IP): Performed by: NURSE PRACTITIONER

## 2022-11-13 PROCEDURE — 6360000002 HC RX W HCPCS: Performed by: NURSE PRACTITIONER

## 2022-11-13 PROCEDURE — 6360000002 HC RX W HCPCS: Performed by: PSYCHIATRY & NEUROLOGY

## 2022-11-13 PROCEDURE — 1240000000 HC EMOTIONAL WELLNESS R&B

## 2022-11-13 PROCEDURE — 6370000000 HC RX 637 (ALT 250 FOR IP): Performed by: PSYCHIATRY & NEUROLOGY

## 2022-11-13 RX ORDER — BACITRACIN, NEOMYCIN, POLYMYXIN B 400; 3.5; 5 [USP'U]/G; MG/G; [USP'U]/G
OINTMENT TOPICAL 2 TIMES DAILY
Status: DISCONTINUED | OUTPATIENT
Start: 2022-11-13 | End: 2022-11-15 | Stop reason: HOSPADM

## 2022-11-13 RX ADMIN — LITHIUM CARBONATE 300 MG: 300 CAPSULE, GELATIN COATED ORAL at 08:22

## 2022-11-13 RX ADMIN — BACITRACIN ZINC, NEOMYCIN SULFATE, POLYMYXIN B SULFATE 1 G: 3.5; 5000; 4 OINTMENT TOPICAL at 12:34

## 2022-11-13 RX ADMIN — DIPHENHYDRAMINE HYDROCHLORIDE 50 MG: 50 INJECTION, SOLUTION INTRAMUSCULAR; INTRAVENOUS at 09:29

## 2022-11-13 RX ADMIN — LITHIUM CARBONATE 300 MG: 300 CAPSULE, GELATIN COATED ORAL at 16:42

## 2022-11-13 RX ADMIN — BACITRACIN ZINC, NEOMYCIN SULFATE, POLYMYXIN B SULFATE 1 G: 3.5; 5000; 4 OINTMENT TOPICAL at 20:58

## 2022-11-13 RX ADMIN — DIVALPROEX SODIUM 500 MG: 500 TABLET, DELAYED RELEASE ORAL at 17:22

## 2022-11-13 RX ADMIN — HYDROXYZINE PAMOATE 50 MG: 50 CAPSULE ORAL at 08:25

## 2022-11-13 RX ADMIN — DIVALPROEX SODIUM 500 MG: 500 TABLET, DELAYED RELEASE ORAL at 12:34

## 2022-11-13 RX ADMIN — LITHIUM CARBONATE 300 MG: 300 CAPSULE, GELATIN COATED ORAL at 12:34

## 2022-11-13 RX ADMIN — MELATONIN 3 MG ORAL TABLET 3 MG: 3 TABLET ORAL at 20:57

## 2022-11-13 RX ADMIN — OLANZAPINE 10 MG: 5 TABLET, ORALLY DISINTEGRATING ORAL at 20:57

## 2022-11-13 RX ADMIN — OLANZAPINE 10 MG: 5 TABLET, ORALLY DISINTEGRATING ORAL at 08:22

## 2022-11-13 RX ADMIN — LEVETIRACETAM 1000 MG: 500 TABLET, FILM COATED ORAL at 20:57

## 2022-11-13 RX ADMIN — LORAZEPAM 2 MG: 2 INJECTION INTRAMUSCULAR; INTRAVENOUS at 09:29

## 2022-11-13 RX ADMIN — DIVALPROEX SODIUM 500 MG: 500 TABLET, DELAYED RELEASE ORAL at 08:21

## 2022-11-13 RX ADMIN — ACETAMINOPHEN 650 MG: 325 TABLET, FILM COATED ORAL at 08:21

## 2022-11-13 RX ADMIN — LEVETIRACETAM 1000 MG: 500 TABLET, FILM COATED ORAL at 08:36

## 2022-11-13 RX ADMIN — HALOPERIDOL LACTATE 5 MG: 5 INJECTION, SOLUTION INTRAMUSCULAR at 09:28

## 2022-11-13 ASSESSMENT — PAIN SCALES - GENERAL
PAINLEVEL_OUTOF10: 8
PAINLEVEL_OUTOF10: 0

## 2022-11-13 ASSESSMENT — PAIN DESCRIPTION - ORIENTATION: ORIENTATION: LOWER

## 2022-11-13 ASSESSMENT — PAIN DESCRIPTION - LOCATION: LOCATION: BACK

## 2022-11-13 ASSESSMENT — PAIN DESCRIPTION - DESCRIPTORS: DESCRIPTORS: STABBING
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
## 2022-11-13 NOTE — PROGRESS NOTES
BEHAVIORAL SERVICES: One - Hour In- Person Review  For Management of Violent or Self - Destructive Behavior    Seclusion/Restraint:  seclusion/ 4 pt restraints    Reason for Intervention: violent behavior  threatening staff and peers     Response to Intervention:  remains combative and threatening staff    Medical Record reviewed and discussed precipitating events/behaviors with RN initiating Intervention:  Yes    Patient Medical Status:  Vital Signs:  WNL  Respiratory Status: WNL  Circulatory Status: WNL  Skin Integrity:WNL    Orientation: oriented to person place date    Mood/Affect: irritable    Speech: Soft    Thought Content: paranoid    Thought Processes: Perseveration    Rationale for continued use of intervention:  pt not redirectable  continues to threaten staff    Rationale for discontinuing intervention: Patient is able to:  pt is able to maintain control  free of HI and no longer threatening staff     One Hour Review Evaluation Physician Notification: yes

## 2022-11-13 NOTE — PROGRESS NOTES
Patient was removed from restraints to use the restroom with her verbal understanding that staff did not feel she was ready to be left out. Patient voided, all the while saying she wanted to choke me. She returned to the seclusion room but then backed into the corner with a childlike grin and refuse to cooperate. Restraints were reapplied with assistance.
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
## 2022-11-13 NOTE — PROGRESS NOTES
Attended afternoon leisure activity. Enjoyed football and popcorn, social with peers. Was 1 of 12 in attendance.

## 2022-11-13 NOTE — PLAN OF CARE
Problem: Self Harm/Suicidality  Goal: Will have no self-injury during hospital stay  Description: INTERVENTIONS:  1. 1:1 for patient safety  2. Q SHIFT & PRN: Assess risk to determine if routine checks are adequate to maintain patient safety  11/13/2022 1434 by Melanie Panda RN  Outcome: Not Progressing  11/13/2022 0433 by Mariah Rodríguez RN  Outcome: Progressing     Problem: Psychosis  Goal: Will report no hallucinations or delusions  Description: INTERVENTIONS:  1. Administer medication as  ordered  2. Assist with reality testing to support increasing orientation  3. Assess if patient's hallucinations or delusions are encouraging self harm or harm to others and intervene as appropriate  11/13/2022 1434 by Melanie Panda RN  Outcome: Not Progressing  11/13/2022 0433 by Mariah Rodríguez RN  Outcome: Progressing     Problem: Involuntary Admit  Goal: Will cooperate with staff recommendations and doctor's orders and will demonstrate appropriate behavior  Description: INTERVENTIONS:  1. Treat underlying conditions and offer medication as ordered  2. Educate regarding involuntary admission procedures and rules  3.  Contain excessive/inappropriate behavior per unit and hospital policies  [id number] by Melanie Panda RN  Outcome: Not Progressing  Flowsheets (Taken 11/13/2022 1041)  Will cooperate with staff recommendations and doctor's orders and will demonstrate appropriate behavior:   Contain excessive/inappropriate behavior per unit and hospital policies   Treat underlying conditions and offer medication as ordered  11/13/2022 0433 by Mariah Rodríguez RN  Outcome: Progressing

## 2022-11-13 NOTE — PROGRESS NOTES
Excused from morning group, as was in seclusion due to behavior. Will continue to encourage improved participation as cooperates.

## 2022-11-13 NOTE — PROGRESS NOTES
Pt made a drawing of a stick figure with antennae and said \"this is what I see, I'm tired of seeing them. Pt assured she is kept safe, eventually returned to bed and went to sleep.

## 2022-11-14 VITALS
HEIGHT: 64 IN | SYSTOLIC BLOOD PRESSURE: 142 MMHG | OXYGEN SATURATION: 100 % | TEMPERATURE: 99.1 F | HEART RATE: 99 BPM | DIASTOLIC BLOOD PRESSURE: 78 MMHG | RESPIRATION RATE: 15 BRPM | WEIGHT: 293 LBS | BODY MASS INDEX: 50.02 KG/M2

## 2022-11-14 PROCEDURE — 99232 SBSQ HOSP IP/OBS MODERATE 35: CPT | Performed by: NURSE PRACTITIONER

## 2022-11-14 PROCEDURE — 6370000000 HC RX 637 (ALT 250 FOR IP): Performed by: PSYCHIATRY & NEUROLOGY

## 2022-11-14 PROCEDURE — 1240000000 HC EMOTIONAL WELLNESS R&B

## 2022-11-14 PROCEDURE — 6370000000 HC RX 637 (ALT 250 FOR IP): Performed by: NURSE PRACTITIONER

## 2022-11-14 RX ADMIN — ACETAMINOPHEN 650 MG: 325 TABLET, FILM COATED ORAL at 16:14

## 2022-11-14 RX ADMIN — ALUMINUM HYDROXIDE, MAGNESIUM HYDROXIDE, AND SIMETHICONE 30 ML: 200; 200; 20 SUSPENSION ORAL at 16:14

## 2022-11-14 RX ADMIN — LEVETIRACETAM 1000 MG: 500 TABLET, FILM COATED ORAL at 09:30

## 2022-11-14 RX ADMIN — DIVALPROEX SODIUM 500 MG: 500 TABLET, DELAYED RELEASE ORAL at 12:30

## 2022-11-14 RX ADMIN — DIVALPROEX SODIUM 500 MG: 500 TABLET, DELAYED RELEASE ORAL at 09:29

## 2022-11-14 RX ADMIN — OLANZAPINE 10 MG: 5 TABLET, ORALLY DISINTEGRATING ORAL at 21:06

## 2022-11-14 RX ADMIN — DIVALPROEX SODIUM 500 MG: 500 TABLET, DELAYED RELEASE ORAL at 18:42

## 2022-11-14 RX ADMIN — MELATONIN 3 MG ORAL TABLET 3 MG: 3 TABLET ORAL at 21:07

## 2022-11-14 RX ADMIN — LITHIUM CARBONATE 300 MG: 300 CAPSULE, GELATIN COATED ORAL at 09:30

## 2022-11-14 RX ADMIN — BACITRACIN ZINC, NEOMYCIN SULFATE, POLYMYXIN B SULFATE: 3.5; 5000; 4 OINTMENT TOPICAL at 11:21

## 2022-11-14 RX ADMIN — LEVETIRACETAM 1000 MG: 500 TABLET, FILM COATED ORAL at 21:07

## 2022-11-14 RX ADMIN — OLANZAPINE 10 MG: 5 TABLET, ORALLY DISINTEGRATING ORAL at 09:29

## 2022-11-14 RX ADMIN — LITHIUM CARBONATE 300 MG: 300 CAPSULE, GELATIN COATED ORAL at 18:42

## 2022-11-14 RX ADMIN — LITHIUM CARBONATE 300 MG: 300 CAPSULE, GELATIN COATED ORAL at 12:30

## 2022-11-14 ASSESSMENT — PAIN SCALES - GENERAL
PAINLEVEL_OUTOF10: 0
PAINLEVEL_OUTOF10: 5
PAINLEVEL_OUTOF10: 0

## 2022-11-14 ASSESSMENT — PAIN DESCRIPTION - LOCATION: LOCATION: BACK;HEAD

## 2022-11-14 ASSESSMENT — PAIN - FUNCTIONAL ASSESSMENT: PAIN_FUNCTIONAL_ASSESSMENT: ACTIVITIES ARE NOT PREVENTED

## 2022-11-14 NOTE — PROGRESS NOTES
BEHAVIORAL HEALTH FOLLOW-UP NOTE     11/14/2022     Patient was seen and examined in person, Chart reviewed   Patient's case discussed with staff/team    Chief Complaint: behavioral, enjoying the attention. Interim History: Patient up on the unit bright and pleasant smiling seems to be enjoying the attention she is receiving on the unit. She again she was placed in restraints yesterday. Still for today however she has not required restraints. She does not voice any suicidal ideations on my assessment and she does not make any statements regarding auditory or visual hallucinations of note she does not appear to be internally preoccupied or internally stimulated she has not observed talking to unseen others. She was reportedly out on the unit yesterday enjoying watching football and eating popcorn and socializing with peers. She is frequently out engaged in the milieu social select peers.   She has very poor insight and judgment and very poor impulse control        Appetite:   [x] Normal/Unchanged  [] Increased  [] Decreased      Sleep:       [x] Normal/Unchanged  [] Fair       [] Poor              Energy:    [x] Normal/Unchanged  [] Increased  [] Decreased        SI [] Present  [x] Absent    HI  []Present  [x] Absent     Aggression:  [] yes  [x] no    Patient is [x] able  [] unable to CONTRACT FOR SAFETY     PAST MEDICAL/PSYCHIATRIC HISTORY:   Past Medical History:   Diagnosis Date    ADHD     Autism disorder     Colitis     Schizophrenia ([facility]     Seizures ([facility]        FAMILY/SOCIAL HISTORY:  Family History   Problem Relation Age of Onset    Diabetes Mother     Other Mother         lupus     Social History     Socioeconomic History    Marital status: Single     Spouse name: Not on file    Number of children: Not on file    Years of education: Not on file    Highest education level: Not on file   Occupational History    Not on file   Tobacco Use    Smoking status: Never    Smokeless tobacco: Never   Vaping Use    Vaping Use: Never used   Substance and Sexual Activity    Alcohol use: No    Drug use: No    Sexual activity: Never   Other Topics Concern    Not on file   Social History Narrative    Not on file     Social Determinants of Health     Financial Resource Strain: Not on file   Food Insecurity: Not on file   Transportation Needs: Not on file   Physical Activity: Not on file   Stress: Not on file   Social Connections: Not on file   Intimate Partner Violence: Not on file   Housing Stability: Not on file           ROS:  [x] All negative/unchanged except if checked.  Explain positive(checked items) below:  [] Constitutional  [] Eyes  [] Ear/Nose/Mouth/Throat  [] Respiratory  [] CV  [] GI  []   [] Musculoskeletal  [] Skin/Breast  [] Neurological  [] Endocrine  [] Heme/Lymph  [] Allergic/Immunologic    Explanation:     MEDICATIONS:    Current Facility-Administered Medications:     phenol 1.4 % mouth spray 1 spray, 1 spray, Mouth/Throat, M6H PRN, Clara Thomas APRN - CNP    neomycin-bacitracin-polymyxin (NEOSPORIN) ointment, , Topical, BID, Bethanie Flores APRN - CNP, Given at 11/14/22 1121    diphenhydrAMINE (BENADRYL) injection 50 mg, 50 mg, IntraVENous, Q6H PRN, 50 mg at 11/13/22 0929 **AND** LORazepam (ATIVAN) injection 2 mg, 2 mg, IntraMUSCular, Q6H PRN, Bethanie Flores APRN - CNP, 2 mg at 11/13/22 3188    acetaminophen (TYLENOL) tablet 650 mg, 650 mg, Oral, Q6H PRN, Paulette Arce MD, 650 mg at 11/13/22 2317    magnesium hydroxide (MILK OF MAGNESIA) 400 MG/5ML suspension 30 mL, 30 mL, Oral, Daily PRN, Paulette Arce MD    nicotine (NICODERM CQ) 21 MG/24HR 1 patch, 1 patch, TransDERmal, Daily, Paulette Arce MD    aluminum & magnesium hydroxide-simethicone (MAALOX) 200-200-20 MG/5ML suspension 30 mL, 30 mL, Oral, PRN, Paulette Arce MD    hydrOXYzine pamoate (VISTARIL) capsule 50 mg, 50 mg, Oral, TID PRN, Paulette Arce MD, 50 mg at 11/13/22 0825    haloperidol (HALDOL) tablet 5 mg, 5 mg, Oral, Q6H PRN, 5 mg at 11/10/22 0157 **OR** haloperidol lactate (HALDOL) injection 5 mg, 5 mg, IntraMUSCular, Q6H PRN, Sandra Recinos MD, 5 mg at 11/13/22 9600    melatonin tablet 3 mg, 3 mg, Oral, Nightly, Sandra Recinos MD, 3 mg at 11/13/22 2057    [START ON 12/3/2022] ARIPiprazole ER (ABILIFY MAINTENA) 400 mg, 400 mg, IntraMUSCular, Q30 Days, Maggy Thomas APRN - CNP    divalproex (DEPAKOTE) DR tablet 500 mg, 500 mg, Oral, TID, RALPH Mcdonald - CNP, 124 mg at 11/14/22 1230    levETIRAcetam (KEPPRA) tablet 1,000 mg, 1,000 mg, Oral, BID, RALPH Mcdonald - CNP, 2,697 mg at 11/14/22 0930    lithium capsule 300 mg, 300 mg, Oral, TID WC, MagygRALPH Elizabeth - CNP, 435 mg at 11/14/22 1230    OLANZapine zydis (ZYPREXA) disintegrating tablet 10 mg, 10 mg, Oral, BID, Anamika Thomas APRN - CNP, 10 mg at 11/14/22 9606      Examination:  /73   Pulse 84   Temp 98.8 °F (37.1 °C) (Oral)   Resp 20   Ht 5' 4\" (1.626 m)   Wt (!) 350 lb (158.8 kg)   SpO2 100%   BMI 60.08 kg/m²   Gait - steady  Medication side effects(SE): Denies    Mental Status Examination:    Level of consciousness:  within normal limits   Appearance:  fair grooming and fair hygiene  Behavior/Motor:  no abnormalities noted  Attitude toward examiner:  cooperative  Speech:  spontaneous, normal rate and normal volume   Mood: \" I am not doing good. \"  Affect: Mood incongruent Bright appropriate and pleasant  Thought processes: Linear thought flight of ideas loose associations  Thought content: Reports auditory hallucinations and visual hallucinations but not appear to be internally stimulated internally preoccupied delusions or other perceptual normalities.   Denies SI/HI intent or plan  Cognition:  oriented to person, place, and time   Concentration intact  Insight poor  Judgement poor    ASSESSMENT:   Patient symptoms are:  [] Well controlled  [] Improving  [x] Worsening  [] No change      Diagnosis:   Principal Problem:    Severe manic bipolar 1 disorder with psychotic behavior ([facility]  Active Problems:    Intellectual disability    Borderline personality disorder ([facility]  Resolved Problems:    Suicidal ideation      LABS:    No results for input(s): WBC, HGB, PLT in the last 72 hours. No results for input(s): NA, K, CL, CO2, BUN, CREATININE, GLUCOSE in the last 72 hours. No results for input(s): BILITOT, ALKPHOS, AST, ALT in the last 72 hours. Lab Results   Component Value Date/Time    LABAMPH NOT DETECTED 11/09/2022 02:07 PM    BARBSCNU NOT DETECTED 11/09/2022 02:07 PM    LABBENZ NOT DETECTED 11/09/2022 02:07 PM    LABMETH NOT DETECTED 11/09/2022 02:07 PM    OPIATESCREENURINE NOT DETECTED 11/09/2022 02:07 PM    PHENCYCLIDINESCREENURINE NOT DETECTED 11/09/2022 02:07 PM    ETOH <10 11/09/2022 02:07 PM     Lab Results   Component Value Date/Time    TSH 2.140 06/23/2022 05:19 AM     Lab Results   Component Value Date    LITHIUM 0.27 (L) 06/23/2022     Lab Results   Component Value Date    VALPROATE 66 11/09/2022         Treatment Plan:  Reviewed current Medications with the patient. Risks, benefits, side effects, drug-to-drug interactions and alternatives to treatment were discussed. Collateral information:   CD evaluation  Encourage patient to attend group and other milieu activities.   Discharge planning discussed with the patient and treatment team.    Continue Abilify Maintena 400 mg IM every 30 days  Depakote 500 mg 3 times daily  Lithium 300 mg 3 times daily  Zyprexa 10 mg twice daily    PSYCHOTHERAPY/COUNSELING:  [x] Therapeutic interview  [x] Supportive  [] CBT  [] Ongoing  [] Other    [x] Patient continues to need, on a daily basis, active treatment furnished directly by or requiring the supervision of inpatient psychiatric personnel      Anticipated Length of stay: 3 to 7 days based on stability            Electronically signed by RALPH Espinosa CNP on 18/90/1904 at 2:29 PM

## 2022-11-14 NOTE — CARE COORDINATION
Sw encouraged pt to attend psychotherapy group. Pt denied to attend group today. Pt came into group, sat down and looked at her 1:1 and stated \"I cant do there there are a lot of people\" then left group.

## 2022-11-14 NOTE — PROGRESS NOTES
PT. HAS BEEN COMPLIANT TO BEHAVIORAL PLAN SINCE OFF ONE TO ONE OBSERVATION. PT. DENIES SUICIDAL IDEATIONS, HOMICIDAL IDEATIONS AND HALLUCINATIONS. PT. IS SOCIAL AND PLAYS GAMES WITH PEERS AND REMAINS MEDICATION COMPLIANT. PT. VOICED BELIEF THAT \"SOMEONE IN THE GOVERNMENT IS WATCHING ME' AND REMAINS WITH SOMATIC FOCUS, VOICING SEVERAL VAGUE PHYSICAL COMPLAINTS REGARDING RESPIRATORY, COLD, AND G. I. SYMPTOMS, I.E., REFLUX AND CRAMPING/GAS.

## 2022-11-14 NOTE — PLAN OF CARE
Problem: Self Harm/Suicidality  Goal: Will have no self-injury during hospital stay  Description: INTERVENTIONS:  1. Q 30 MINUTES: Routine safety checks  2. Q SHIFT & PRN: Assess risk to determine if routine checks are adequate to maintain patient safety  11/14/2022 0446 by Pepe Heller RN  Outcome: Progressing  NO SELF HARM. PT. DENIES SUICIDAL IDEATIONS, HOMICIDAL IDEATIONS AND HALLUCINATIONS. Problem: Psychosis  Goal: Will report no hallucinations or delusions  Description: INTERVENTIONS:  1. Administer medication as  ordered  2. Assist with reality testing to support increasing orientation  3. Assess if patient's hallucinations or delusions are encouraging self harm or harm to others and intervene as appropriate  11/14/2022 1148 by Daryll Gilford, RN  Outcome: Progressing  11/14/2022 0446 by Pepe Heller RN  Outcome: Progressing  PT. REPORTED BELIEF \"THAT SOMEONE IN THE GOVERNMENT IS WATCHING ME\".

## 2022-11-14 NOTE — CARE COORDINATION
Sw spoke with [facility] Isaac Williamson [phone] who stated that the pt is active and she has an upcoming appointment scheduled the first week of December. Isaac Williamson stated they are trying to get pt seen at a sooner time. Pt is being seen at [employer] in office with Lina Hart.

## 2022-11-14 NOTE — BH NOTE
Patient asleep at this time.  Dinner tray set aside to be offered when awake, and Lithium time moved tentatively to 18:00, to be taken with meal.
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
## 2022-11-14 NOTE — PROGRESS NOTES
Active and engaged during relaxation group. Accepting of handout and interactive with guided imagery activity. Was 1 of 16 in attendance.

## 2022-11-14 NOTE — BH NOTE
Currently eating dinner in the hallway at a table. Given scheduled Depakote and Lithium at this time.

## 2022-11-15 PROCEDURE — 6370000000 HC RX 637 (ALT 250 FOR IP): Performed by: NURSE PRACTITIONER

## 2022-11-15 PROCEDURE — 6370000000 HC RX 637 (ALT 250 FOR IP): Performed by: PSYCHIATRY & NEUROLOGY

## 2022-11-15 PROCEDURE — 99239 HOSP IP/OBS DSCHRG MGMT >30: CPT | Performed by: NURSE PRACTITIONER

## 2022-11-15 RX ORDER — SIMETHICONE 80 MG
80 TABLET,CHEWABLE ORAL 4 TIMES DAILY PRN
Status: DISCONTINUED | OUTPATIENT
Start: 2022-11-15 | End: 2022-11-15 | Stop reason: HOSPADM

## 2022-11-15 RX ADMIN — LITHIUM CARBONATE 300 MG: 300 CAPSULE, GELATIN COATED ORAL at 12:32

## 2022-11-15 RX ADMIN — OLANZAPINE 10 MG: 5 TABLET, ORALLY DISINTEGRATING ORAL at 09:24

## 2022-11-15 RX ADMIN — DIVALPROEX SODIUM 500 MG: 500 TABLET, DELAYED RELEASE ORAL at 12:32

## 2022-11-15 RX ADMIN — DIVALPROEX SODIUM 500 MG: 500 TABLET, DELAYED RELEASE ORAL at 09:25

## 2022-11-15 RX ADMIN — DIVALPROEX SODIUM 500 MG: 500 TABLET, DELAYED RELEASE ORAL at 17:48

## 2022-11-15 RX ADMIN — HYDROXYZINE PAMOATE 50 MG: 50 CAPSULE ORAL at 12:32

## 2022-11-15 RX ADMIN — LITHIUM CARBONATE 300 MG: 300 CAPSULE, GELATIN COATED ORAL at 09:24

## 2022-11-15 RX ADMIN — BACITRACIN ZINC, NEOMYCIN SULFATE, POLYMYXIN B SULFATE 1 G: 3.5; 5000; 4 OINTMENT TOPICAL at 09:24

## 2022-11-15 RX ADMIN — LEVETIRACETAM 1000 MG: 500 TABLET, FILM COATED ORAL at 09:24

## 2022-11-15 RX ADMIN — LITHIUM CARBONATE 300 MG: 300 CAPSULE, GELATIN COATED ORAL at 17:49

## 2022-11-15 ASSESSMENT — PAIN SCALES - GENERAL: PAINLEVEL_OUTOF10: 0

## 2022-11-15 NOTE — PLAN OF CARE
Problem: Self Harm/Suicidality  Goal: Will have no self-injury during hospital stay  Description: INTERVENTIONS:  1. Q 30 MINUTES: Routine safety checks  2. Q SHIFT & PRN: Assess risk to determine if routine checks are adequate to maintain patient safety  11/15/2022 1838 by Ramya Carey RN  Outcome: Completed     Problem: Depression  Goal: Will be euthymic at discharge  Description: INTERVENTIONS:  1. Administer medication as ordered  2. Provide emotional support via 1:1 interaction with staff  3. Encourage involvement in milieu/groups/activities  4. Monitor for social isolation  Outcome: Completed     Problem: Psychosis  Goal: Will report no hallucinations or delusions  Description: INTERVENTIONS:  1. Administer medication as  ordered  2. Assist with reality testing to support increasing orientation  3. Assess if patient's hallucinations or delusions are encouraging self harm or harm to others and intervene as appropriate  11/15/2022 1838 by Ramya Carey RN  Outcome: Completed     Problem: Anxiety  Goal: Will report anxiety at manageable levels  Description: INTERVENTIONS:  1. Administer medication as ordered  2. Teach and rehearse alternative coping skills  3. Provide emotional support with 1:1 interaction with staff  Outcome: Completed     Problem: Involuntary Admit  Goal: Will cooperate with staff recommendations and doctor's orders and will demonstrate appropriate behavior  Description: INTERVENTIONS:  1. Treat underlying conditions and offer medication as ordered  2. Educate regarding involuntary admission procedures and rules  3.  Contain excessive/inappropriate behavior per unit and hospital policies  Outcome: Completed     Problem: Pain  Goal: Verbalizes/displays adequate comfort level or baseline comfort level  Outcome: Completed     Problem: Safety - Violent/Self-destructive Restraint  Goal: Remains free of injury from restraints (Restraint for Violent/Self-Destructive Behavior)  Description: INTERVENTIONS:  1. Determine that de-escalation and other, less restrictive measures have been tried or would not be effective before applying the restraint  2. Identify and document the criteria for restraint  3. Evaluate the patient's condition at the time of restraint application  4. Inform patient/family regarding the reason for restraint/seclusion  5. Q2H: Monitor comfort, nutrition and hydration needs  6. Q15M: Perform safety checks including skin, circulation, sensory, respiratory and psychological status  7. Ensure continuous observation  8.  Identify and implement measures to help patient regain control, assess readiness for release and initiate progressive release per policy  Outcome: Completed

## 2022-11-15 NOTE — PSYCHOTHERAPY
[facility]  Discharge Note    Pt discharged with followings belongings:   Dental Appliances: None  Vision - Corrective Lenses: None  Hearing Aid: None  Jewelry: Watch  Body Piercings Removed: N/A  Clothing: Footwear, Jacket/Coat, Hat, Undergarments, Pants, Shirt  Other Valuables: Money (87 cents no bank cards or cash)   Valuables sent home withpt or returned to patient. Patient educated on aftercare instructions: yes  Information faxed to [facility] by JACOB  at 6:50 PM .Patient verbalize understanding of AVS:  yes. Status EXAM upon discharge:  Mental Status and Behavioral Exam  Normal: Yes  Level of Assistance: Independent/Self  Facial Expression: Brightened  Affect: Stable  Level of Consciousness: Alert  Frequency of Checks: 4 times per hour, close  Mood:Normal: Yes  Mood: Depressed, Anxious  Motor Activity:Normal: Yes  Motor Activity: Decreased  Eye Contact: Good  Observed Behavior: Cooperative, Friendly  Sexual Misconduct History: Current - no  Preception: [place] to person, [place] to time, [place] to place, [place] to situation  Attention:Normal: No  Attention: Distractible  Thought Processes: Other (comment)  Thought Content:Normal: Yes  Thought Content: Preoccupations  Depression Symptoms: No problems reported or observed. Anxiety Symptoms: No problems reported or observed. Jovita Symptoms: No problems reported or observed.   Hallucinations: None  Delusions: No  Delusions:  (NONE VOICED ON ASSESSMENT)  Memory:Normal: No  Memory: Confabulation  Insight and Judgment: No  Insight and Judgment: Poor judgment, Poor insight    Tobacco Screening:  Practical Counseling, on admission, kristie X, if applicable and completed (first 3 are required if patient doesn't refuse):            ( ) Recognizing danger situations (included triggers and roadblocks)                    ( ) Coping skills (new ways to manage stress,relaxation techniques, changing routine, distraction) ( ) Basic information about quitting (benefits of quitting, techniques in how to quit, available resources  ( ) Referral for counseling faxed to [facility]                                                                                                                   ( ) Patient refused counseling  ( ) Patient refused referral  ( ) Patient refused prescription upon discharge  ( x) Patient has not smoked in the last 30 days    Metabolic Screening:    No results found for: LABA1C    No results found for: CHOL  No results found for: TRIG  No results found for: HDL  No components found for: LDLCAL  No results found for: Viola Kramer RN

## 2022-11-15 NOTE — CARE COORDINATION
[facility] leadership team attended zoom meeting with [facility] staff to discuss discharge plan. Plan is for patient to return to [address] [place] [address] today. Pt will follow up with Dr. King Andrew at the [facility] and [facility] for mental health treatment. Per Michele Bolanos 2141 RN at [facility], patient's medications are at the group home and she will administer pt's Abilify Maintena injection on 12/3.  Supervisor faxed discharge paperwork and Behavioral Plan to [facility] ([phone]). Per Alne Tavarez from [facility], a staff member will pick patient up around 6pm. They will call the nurses station when they arrive. RN aware.      In order to ensure appropriate transition and discharge planning is in place, the following documents have been transmitted to [facility], as the new outpatient provider:    The d/c diagnosis was transmitted to the next care provider  The reason for hospitalization was transmitted to the next care provider  The d/c medications (dosage and indication) were transmitted to the next care provider   The continuing care plan was transmitted to the next care provider    KATIE Rosado, [facility] 19 Work Supervisor

## 2022-11-15 NOTE — DISCHARGE SUMMARY
DISCHARGE SUMMARY      Patient ID:  Rodríguez Castro  [id number]  11 y.o.  2000    Admit date: 11/9/2022    Discharge date and time: 11/15/2022    Admitting Physician: Joann Rose MD     Discharge Physician: Dr Lizabeth Bright MD    Discharge Diagnoses:   Patient Active Problem List   Diagnosis    Pelvic mass in female    Post-op pain    Post-operative pain    Altered mental status    Seizures (HCC)    Severe manic bipolar 1 disorder with psychotic behavior ([facility]    Intellectual disability    Borderline personality disorder ([facility]       Admission Condition: poor    Discharged Condition: stable    Admission Circumstance: presented to the ED  reporting suicidal ideations with thoughts of strangling herself and seeing demons and auditory hallucinations demons threatening to harm her and to harm others      PAST MEDICAL/PSYCHIATRIC HISTORY:   Past Medical History:   Diagnosis Date    ADHD     Autism disorder     Colitis     Schizophrenia ([facility]     Seizures ([facility]        FAMILY/SOCIAL HISTORY:  Family History   Problem Relation Age of Onset    Diabetes Mother     Other Mother         lupus     Social History     Socioeconomic History    Marital status: Single     Spouse name: Not on file    Number of children: Not on file    Years of education: Not on file    Highest education level: Not on file   Occupational History    Not on file   Tobacco Use    Smoking status: Never    Smokeless tobacco: Never   Vaping Use    Vaping Use: Never used   Substance and Sexual Activity    Alcohol use: No    Drug use: No    Sexual activity: Never   Other Topics Concern    Not on file   Social History Narrative    Not on file     Social Determinants of Health     Financial Resource Strain: Not on file   Food Insecurity: Not on file   Transportation Needs: Not on file   Physical Activity: Not on file   Stress: Not on file   Social Connections: Not on file   Intimate Partner Violence: Not on file   Housing Stability: Not on file MEDICATIONS:    Current Facility-Administered Medications:     simethicone (MYLICON) chewable tablet 80 mg, 80 mg, Oral, 4x Daily PRN, RALPH Ibarra CNP    phenol 1.4 % mouth spray 1 spray, 1 spray, Mouth/Throat, K4S PRN, MaggyRALPH Elizabeth - CNP    neomycin-bacitracin-polymyxin (NEOSPORIN) ointment, , Topical, BID, RALPH Sales CNP, 1 g at 11/15/22 0924    diphenhydrAMINE (BENADRYL) injection 50 mg, 50 mg, IntraVENous, Q6H PRN, 50 mg at 11/13/22 0929 **AND** LORazepam (ATIVAN) injection 2 mg, 2 mg, IntraMUSCular, Q6H PRN, RALPH Sales CNP, 2 mg at 11/13/22 0294    acetaminophen (TYLENOL) tablet 650 mg, 650 mg, Oral, Q6H PRN, Mirna Watters MD, 650 mg at 11/14/22 1614    magnesium hydroxide (MILK OF MAGNESIA) 400 MG/5ML suspension 30 mL, 30 mL, Oral, Daily PRN, Mirna Watters MD    nicotine (NICODERM CQ) 21 MG/24HR 1 patch, 1 patch, TransDERmal, Daily, Mirna Watters MD    aluminum & magnesium hydroxide-simethicone (MAALOX) 200-200-20 MG/5ML suspension 30 mL, 30 mL, Oral, PRN, Mirna Watters MD, 30 mL at 11/14/22 1614    hydrOXYzine pamoate (VISTARIL) capsule 50 mg, 50 mg, Oral, TID PRN, Mirna Watters MD, 50 mg at 11/15/22 1232    haloperidol (HALDOL) tablet 5 mg, 5 mg, Oral, Q6H PRN, 5 mg at 11/10/22 0157 **OR** haloperidol lactate (HALDOL) injection 5 mg, 5 mg, IntraMUSCular, Q6H PRN, Mirna Watters MD, 5 mg at 11/13/22 1842    melatonin tablet 3 mg, 3 mg, Oral, Nightly, Mirna Watters MD, 3 mg at 11/14/22 2107    [START ON 12/3/2022] ARIPiprazole ER (ABILIFY MAINTENA) 400 mg, 400 mg, IntraMUSCular, Q30 Days, RALPH Ibarra CNP    divalproex (DEPAKOTE) DR tablet 500 mg, 500 mg, Oral, TID, RALPH Madsen CNP, 659 mg at 11/15/22 1232    levETIRAcetam (KEPPRA) tablet 1,000 mg, 1,000 mg, Oral, BID, RALPH Madsen CNP, 9,681 mg at 11/15/22 7998    lithium capsule 300 mg, 300 mg, Oral, TID WC, RALPH Ibarra CNP, 458 mg at 11/15/22 1232    OLANZapine zydis (ZYPREXA) disintegrating tablet 10 mg, 10 mg, Oral, BID, Richad Query Dellick, APRN - CNP, 10 mg at 11/15/22 9915    Examination:  BP (!) 142/78   Pulse 99   Temp 99.1 °F (37.3 °C)   Resp 15   Ht 5' 4\" (1.626 m)   Wt (!) 350 lb (158.8 kg)   SpO2 100%   BMI 60.08 kg/m²   Gait - steady    HOSPITAL COURSE[de-identified]  Patient was admitted to the unit on 11/9/2022 was closely monitored for behavioral disturbances and psychosis. She was evaluated and continued on her home medications of Depakote 500 mg 3 times daily, lithium 300 mg 3 times daily, Zyprexa 10 mg twice daily and continued on her Abilify maintain injection 400 mg IM every 30 days is due on 12/3/2022. Medical events were insignificant and patient continued to improve on the floor. She started coming out of her room she was attending groups to socializing with peers. Patient multiple behavioral disturbances while in the unit. Patient has a history of MRDD as well as borderline personality disorder. Patient was placed on a behavioral plan at which time her behavior started to improve. She was no longer requiring stat IM injections of no longer requiring any seclusion or restraints. She responded well to attention she enjoyed having staff members and nursing students sit with her by herself. Patient was never seen to be internally stimulated or internally preoccupied she was never observed to be talking to unseen others. Treatment team felt the patient obtain the maximum benefit for her hospitalization She was set up with an outpatient mental health agency for outpatient follow-up services . At the time of discharge patient  did not show impulsive behavior. She was up on the unit she was attending groups and socializing with peers. She vehemently denied any suicidal homicidal ideations intent or plan.   She was eating well and sleeping well there are no neurovegetative signs or symptoms of depression she denied any auditory visualizations. There are no overt or covert signs psychosis. She was appreciative of the help that she received here. This patient no longer meets criteria for inpatient hospitalization. No AVH or paranoid thoughts  No hopeless or worthless feeling  No active SI/HI  Appetite:  [x] Normal  [] Increased  [] Decreased    Sleep:       [x] Normal  [] Fair       [] Poor            Energy:    [x] Normal  [] Increased  [] Decreased     SI [] Present  [x] Absent  HI  []Present  [x] Absent   Aggression:  [] yes  [x] no  Patient is [x] able  [] unable to CONTRACT FOR SAFETY   Medication side effects(SE):  [x] None(Psych. Meds.) [] Other      Mental Status Examination on discharge:    Level of consciousness:  within normal limits   Appearance:  well-appearing  Behavior/Motor:  no abnormalities noted  Attitude toward examiner:  attentive and good eye contact  Speech:  spontaneous, normal rate and normal volume   Mood: \"I am okay. \"  Affect: Bright and pleasant  Thought processes: Linear without flights of ideas loose associations  Thought content: Devoid of any auditory or visual hallucinations delusions or any other perceptual normalities.   Denies SI/HI intent or plan  Cognition:  oriented to person, place, and time   Concentration intact  Memory intact  Insight good   Judgement fair   Fund of Knowledge adequate      ASSESSMENT:  Patient symptoms are:  [x] Well controlled  [x] Improving  [] Worsening  [] No change    Reason for more than one antipsychotic:  [x] N/A  [] 3 Failed Monotherapy attempts (Drugs tried:)  [] Crossover to a new antipsychotic  [] Taper to Monotherapy from Polypharmacy  [] Augmentation of clozapine therapy due to treatment resistance to single therapy    Diagnosis:  Principal Problem:    Severe manic bipolar 1 disorder with psychotic behavior ([facility]  Active Problems:    Intellectual disability    Borderline personality disorder ([facility]  Resolved Problems:    Suicidal ideation      LABS:    No results for input(s): WBC, HGB, PLT in the last 72 hours. No results for input(s): NA, K, CL, CO2, BUN, CREATININE, GLUCOSE in the last 72 hours. No results for input(s): BILITOT, ALKPHOS, AST, ALT in the last 72 hours. Lab Results   Component Value Date/Time    LABAMPH NOT DETECTED 11/09/2022 02:07 PM    BARBSCNU NOT DETECTED 11/09/2022 02:07 PM    LABBENZ NOT DETECTED 11/09/2022 02:07 PM    LABMETH NOT DETECTED 11/09/2022 02:07 PM    OPIATESCREENURINE NOT DETECTED 11/09/2022 02:07 PM    PHENCYCLIDINESCREENURINE NOT DETECTED 11/09/2022 02:07 PM    ETOH <10 11/09/2022 02:07 PM     Lab Results   Component Value Date/Time    TSH 2.140 06/23/2022 05:19 AM     Lab Results   Component Value Date    LITHIUM 0.27 (L) 06/23/2022     Lab Results   Component Value Date    VALPROATE 66 11/09/2022       RISK ASSESSMENT AT DISCHARGE: Low risk for suicide and homicide. Treatment Plan:  Reviewed current Medications with the patient. Education provided on the complaince with treatment. Risks, benefits, side effects, drug-to-drug interactions and alternatives to treatment were discussed. Encourage patient to attend outpatient follow up appointment and therapy. Patient was advised to call the outpatient provider, visit the nearest ED or call 911 if symptoms are not manageable. Patient's family member was contacted prior to the discharge.          Medication List        START taking these medications      nicotine 21 MG/24HR  Commonly known as: NICODERM CQ  Place 1 patch onto the skin daily            CONTINUE taking these medications      ARIPiprazole  MG Prsy  Commonly known as: ABILIFY MAINTENA  Inject 400 mg into the muscle every 30 days     divalproex 500 MG DR tablet  Commonly known as: DEPAKOTE  Take 1 tablet by mouth 3 times daily     ibuprofen 800 MG tablet  Commonly known as: ADVIL;MOTRIN  Take 1 tablet by mouth every 8 hours as needed for Pain     levETIRAcetam 1000 MG tablet  Commonly known as: KEPPRA  Take 1 tablet by mouth 2 times daily     lithium 300 MG capsule  Take 1 capsule by mouth 3 times daily (with meals)     melatonin 3 MG Tabs tablet  Take 1 tablet by mouth nightly     miconazole 2 % powder  Commonly known as: MICOTIN  Apply topically 2 times daily.      OLANZapine zydis 10 MG disintegrating tablet  Commonly known as: ZYPREXA  Take 1 tablet by mouth in the morning and at bedtime     pantoprazole 40 MG tablet  Commonly known as: PROTONIX               Where to Get Your Medications        Information about where to get these medications is not yet available    Ask your nurse or doctor about these medications  nicotine 21 MG/24HR       Patient is counseled to most been compliant with all medications all outpatient follow-up appointments    Patient is discharged home in stable condition    TIME SPEND - Gunnar De Los Santos 1841, DISCHARGE SUMMARY, MEDICATION RECONCILIATION AND FOLLOW UP CARE     Signed:  RALPH Siegel - CNP  [id number]  12:39 PM

## 2022-11-15 NOTE — PLAN OF CARE
Problem: Self Harm/Suicidality  Goal: Will have no self-injury during hospital stay  Description: INTERVENTIONS:  1. Q 30 MINUTES: Routine safety checks  2. Q SHIFT & PRN: Assess risk to determine if routine checks are adequate to maintain patient safety  Outcome: Progressing  PT. DENIES SUICIDAL IDEATIONS AND SELF HARM THOUGHTS. Problem: Psychosis  Goal: Will report no hallucinations or delusions  Description: INTERVENTIONS:  1. Administer medication as  ordered  2. Assist with reality testing to support increasing orientation  3. Assess if patient's hallucinations or delusions are encouraging self harm or harm to others and intervene as appropriate  Outcome: Progressing  PT. VOICED NO DELUSIONS AND REPORTS RECENT VISUAL HALLUCINATIONS OF \"SEEING BLACK SPOTS\". PT. SIGNED A VOLUNTARY CONSENT.     PT. HAS BEEN COMPLIANT TO BEHAVIORAL PLAN.

## 2022-11-15 NOTE — PROGRESS NOTES
Pt denies SI and HI. Pt positive for visual hallucinations. Pt complained of stomach pain and a headache. PRNs given. Pt has been in control and cooperative. No outbursts. No self harm. Pt brightens with conversation. Medication compliant. Appetite appropriate. Sat out on the unit to watch tv with no issues. Encouraged groups. Will continue to monitor.

## 2022-11-15 NOTE — BH NOTE
Patient resting quietly in bed with eyes closed. Respirations olivia and balanced with no signs of distress observed. Q15min rounds continue.

## 2022-11-15 NOTE — CARE COORDINATION
KIM called [address] [phone] and spoke with jose to inform her that the pt is on a HARDWICK and needs to get an appointment 12/3. Kim is waiting for a return call back to discuss pt's appointment.

## 2022-11-15 NOTE — PROGRESS NOTES
PT. HAS BEEN IN GOOD CONTROL THE ENTIRE SHIFT. PT. DENIED THOUGHTS OF HARM TO SELF AND OTHERS, NO VOICED DELUSIONS. PT. DID REPORT EARLIER THAT HAS PERIODS OF \"SEEING BLACK SPOTS\". PT. IS LESS SOMATICALLY FOCUSED. PT. HAS BEEN COMPLIANT TO MEDICATIONS AND BEHAVIORAL PLAN.

## 2022-11-15 NOTE — CARE COORDINATION
JACOB spoke with Sekou Martinez [phone]  at [facility] who stated that she would like to have a meeting to discuss pt's behaviors prior to pt returning to [facility] due to pt's behaviors. JACOB forwarded phone call to Gunnar Greenberg [phone].

## 2022-11-15 NOTE — CARE COORDINATION
Sw encouraged pt to attend psychotherapy group. Pt denied to attend group today. Pt was going to come into group room then walked out.
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
## 2023-02-06 ENCOUNTER — HOSPITAL ENCOUNTER (OUTPATIENT)
Dept: ULTRASOUND IMAGING | Age: 23
Discharge: HOME | End: 2023-02-08
Payer: COMMERCIAL

## 2023-02-06 DIAGNOSIS — R10.2 PERINEAL PAIN IN FEMALE: ICD-10-CM

## 2023-02-06 PROCEDURE — 76856 US EXAM PELVIC COMPLETE: CPT

## 2023-03-09 ENCOUNTER — OFFICE VISIT (OUTPATIENT)
Dept: NEUROLOGY | Age: 23
End: 2023-03-09
Payer: COMMERCIAL

## 2023-03-09 VITALS
TEMPERATURE: 98.3 F | OXYGEN SATURATION: 100 % | HEART RATE: 96 BPM | SYSTOLIC BLOOD PRESSURE: 139 MMHG | DIASTOLIC BLOOD PRESSURE: 75 MMHG

## 2023-03-09 DIAGNOSIS — F20.9 SCHIZOPHRENIA, UNSPECIFIED TYPE (HCC): ICD-10-CM

## 2023-03-09 DIAGNOSIS — F84.0 AUTISM: ICD-10-CM

## 2023-03-09 DIAGNOSIS — R56.9 SEIZURES (HCC): Primary | ICD-10-CM

## 2023-03-09 PROCEDURE — G8427 DOCREV CUR MEDS BY ELIG CLIN: HCPCS | Performed by: NURSE PRACTITIONER

## 2023-03-09 PROCEDURE — G8417 CALC BMI ABV UP PARAM F/U: HCPCS | Performed by: NURSE PRACTITIONER

## 2023-03-09 PROCEDURE — 1036F TOBACCO NON-USER: CPT | Performed by: NURSE PRACTITIONER

## 2023-03-09 PROCEDURE — G8484 FLU IMMUNIZE NO ADMIN: HCPCS | Performed by: NURSE PRACTITIONER

## 2023-03-09 PROCEDURE — 99204 OFFICE O/P NEW MOD 45 MIN: CPT | Performed by: NURSE PRACTITIONER

## 2023-03-09 RX ORDER — DIVALPROEX SODIUM 500 MG/1
500 TABLET, DELAYED RELEASE ORAL 3 TIMES DAILY
Qty: 90 TABLET | Refills: 11 | Status: SHIPPED | OUTPATIENT
Start: 2023-03-09 | End: 2023-04-08

## 2023-03-09 RX ORDER — LACOSAMIDE 150 MG/1
150 TABLET ORAL 2 TIMES DAILY
Qty: 60 TABLET | Refills: 11 | Status: SHIPPED | OUTPATIENT
Start: 2023-03-09 | End: 2023-04-08

## 2023-03-09 NOTE — PROGRESS NOTES
[facility]. Barbie Santacruz M.D., F.A.C.P. Albin Crigler, DNP, APRN, ACNS-BC  Erasto Vincent. Humble Martel, MSN, APRN-FNP-C  Sierra Leon, MSN, APRN-FNP-C  NITA AyalaAS, PA-C  Sidney Covington, MSN, APRN-FNP-C  [address], [facility]  [place], [address]  Phone: [phone]  Fax: [phone]       Colton Zuleta is a 25 y.o. right handed female     Patient referred for further evaluation/management of seizures    She is accompanied by     Past Medical History:     Past Medical History:   Diagnosis Date    ADHD     Autism disorder     Colitis     Schizophrenia ([facility] [place] [address]     Seizures ([facility] [place] [address]      Past Surgical History:       Past Surgical History:   Procedure Laterality Date    BREAST SURGERY Right     lumpectomy    LAPAROSCOPY Left 8/9/2019    LAPAROSCOPY, REMOVAL OF LEFT OVARIAN MASS, PERITONEAL WASHINGS FOR CELL BLOCK performed by Kevin Jameson MD at [address] [place] [address] [place]:       Dye [barium-containing compounds], Sulfa antibiotics, and Versed [midazolam]    Medications:     Prior to Admission medications    Medication Sig Start Date End Date Taking?  Authorizing Provider   divalproex (DEPAKOTE) 500 MG DR tablet Take 1 tablet by mouth 3 times daily 10/1/22 3/9/23 Yes RALPH Ni CNP   levETIRAcetam (KEPPRA) 1000 MG tablet Take 1 tablet by mouth 2 times daily 10/1/22 3/9/23 Yes RALPH Ni CNP   OLANZapine zydis (ZYPREXA) 10 MG disintegrating tablet Take 1 tablet by mouth in the morning and at bedtime 10/1/22 3/9/23 Yes RALPH Ni CNP   ARIPiprazole ER (ABILIFY MAINTENA) 400 MG PRSY Inject 400 mg into the muscle every 30 days 10/2/22  Yes RALPH Ni CNP   lithium 300 MG capsule Take 1 capsule by mouth 3 times daily (with meals) 10/1/22 3/9/23 Yes RALPH Ni CNP   nicotine (NICODERM CQ) 21 MG/24HR Place 1 patch onto the skin daily 11/12/22 08/94/26  Veronica King, APRN - CNP   miconazole (Williams Cea) 2 % powder Apply topically 2 times daily. 10/1/22   [facility], APRN - CNP   melatonin 3 MG TABS tablet Take 1 tablet by mouth nightly 10/1/22 10/31/22  [facility], RALPH - CNP   ibuprofen (ADVIL;MOTRIN) 800 MG tablet Take 1 tablet by mouth every 8 hours as needed for Pain 10/1/22   Nanci Arredondoer,    pantoprazole (PROTONIX) 40 MG tablet Take 40 mg by mouth daily     Historical Provider, MD     Social History:       She reports that she has never smoked. She has never used smokeless tobacco. She reports that she does not drink alcohol and does not use drugs. Review of Systems:     No issues with chewing or swallowing  No chest pain or palpitations  No SOB  No vertigo, lightheadedness or loss of consciousness  No falls, tripping or stumbling  No incontinence of bowels or bladder  No itching or bruising appreciated  No numbness, tingling or focal arm/leg weakness    ROS is otherwise negative    Family History:     Family History   Problem Relation Age of Onset    Diabetes Mother     Other Mother         lupus      History of Present Illness:     Patient referred for further evaluation/management of seizures. She has past medical history of anxiety/depression, autism, schizophrenia, ADD ADHD.  that is accompanying patient notes that patient has been living at the facility for over a year now due to her mother not being able to care for her. Although he knows that patient has not had a seizure since living at the facility. They have no information as to what type of seizures or how long patient has had seizures. Patient is currently on Depakote 500 mg 3 times daily and Keppra 1000 mg twice daily.     Objective:     /75 (Site: Right Lower Arm)   Pulse 96   Temp 98.3 °F (36.8 °C)   SpO2 100%     General appearance: alert, appears stated age, cooperative and in no distress  Head: normocephalic, without obvious abnormality, atraumatic  Eyes: conjunctivae/corneas clear; no drainage  Neck: supple, symmetrical, trachea midline   Lungs: Respirations nonlabored  Extremities: normal, atraumatic, no cyanosis or edema  Skin:  color, texture, turgor normal--no rashes or lesions      Mental Status: alert and oriented to self, time and place. Follows commands.     Poor attention/concentration  Impaired fundus of knowledge  Impaired memories    Speech: Mild dysarthria  Language: no aphasias    Cranial Nerves:  I: smell    II: visual acuity     II: visual fields Full    II: pupils ANTOINETTE   III,VII: ptosis None   III,IV,VI: extraocular muscles  EOMI without nystagmus   V: mastication Normal   V: facial light touch sensation  Normal   V,VII: corneal reflex     VII: facial muscle function - upper  Normal   VII: facial muscle function - lower Normal   VIII: hearing Normal   IX: soft palate elevation  Normal   IX,X: gag reflex    XI: trapezius strength  5/5   XI: sternocleidomastoid strength 5/5   XI: neck extension strength  5/5   XII: tongue strength  Normal     Motor:  4/5 throughout  Normal bulk and tone  No drift   No abnormal movements    Sensory:  LT normal    Coordination:   FN, FFM and DANA normal    Gait:  Normal    DTR:   2+ throughout    Laboratory/Radiology:  ry/Radiology:     CBC with differential, CMP, lipid panel reviewed    All labs and images were personally reviewed at the time of this visit    Assessment:     Seizure disorder in patient with autism  --- Currently controlled on Depakote and Keppra  --- Due to patient's history of psych disorder we will recommend that Keppra be weaned off and switched to Vimpat due to Keppra side effects of irritability/agitation  --- No reported seizures > 1 year    Plan:     Continue Depakote 500 mg 3 times daily    Wean off Keppra  Take 1000 mg in the morning and 500 mg in the evening for 7 days then  Take 500 mg twice daily for 7 days then  Take 500 mg once a day for 7 days then stop     Start Vimpat 150 mg twice daily    Follow-up in 6-month    Call if any seizures occur or with questions/concerns      RALPH Bauer CNP  9:18 AM  3/9/2023    I spent 45 minutes with this patient obtaining the HPI and discussing the exam with greater than 50% of the time providing counseling and education on medications and other treatment plans. All questions were answered prior to leaving my office.

## 2023-03-09 NOTE — PATIENT INSTRUCTIONS
Keppra wean schedule    Take 1000 mg in the morning and 500 mg in the evening for 7 days then  Take 500 mg twice daily for 7 days then  Take 500 mg once a day for 7 days then stop

## 2023-03-13 ENCOUNTER — TELEPHONE (OUTPATIENT)
Dept: NEUROLOGY | Age: 23
End: 2023-03-13

## 2023-03-13 NOTE — TELEPHONE ENCOUNTER
Approval Lacosamide 150 mg.good from 3/9/23 to 3/9/24.     Electronically signed by Traci Bowman MA on 3/13/2023 at 1:32 PM

## 2023-06-09 ENCOUNTER — APPOINTMENT (OUTPATIENT)
Dept: CT IMAGING | Age: 23
End: 2023-06-09
Attending: EMERGENCY MEDICINE
Payer: COMMERCIAL

## 2023-06-09 ENCOUNTER — APPOINTMENT (OUTPATIENT)
Dept: GENERAL RADIOLOGY | Age: 23
End: 2023-06-09
Payer: COMMERCIAL

## 2023-06-09 ENCOUNTER — HOSPITAL ENCOUNTER (EMERGENCY)
Age: 23
Discharge: HOME | End: 2023-06-09
Attending: EMERGENCY MEDICINE
Payer: COMMERCIAL

## 2023-06-09 VITALS
SYSTOLIC BLOOD PRESSURE: 131 MMHG | OXYGEN SATURATION: 98 % | WEIGHT: 293 LBS | TEMPERATURE: 98.1 F | RESPIRATION RATE: 16 BRPM | DIASTOLIC BLOOD PRESSURE: 75 MMHG | HEIGHT: 64 IN | HEART RATE: 88 BPM | BODY MASS INDEX: 50.02 KG/M2

## 2023-06-09 DIAGNOSIS — R07.9 CHEST PAIN, UNSPECIFIED TYPE: Primary | ICD-10-CM

## 2023-06-09 DIAGNOSIS — K59.00 CONSTIPATION, UNSPECIFIED CONSTIPATION TYPE: ICD-10-CM

## 2023-06-09 DIAGNOSIS — R10.32 LEFT LOWER QUADRANT ABDOMINAL PAIN: ICD-10-CM

## 2023-06-09 LAB
ALBUMIN SERPL-MCNC: 4.3 G/DL (ref 3.5–5.2)
ALP SERPL-CCNC: 67 U/L (ref 35–104)
ALT SERPL-CCNC: 21 U/L (ref 0–32)
ANION GAP SERPL CALCULATED.3IONS-SCNC: 11 MMOL/L (ref 7–16)
AST SERPL-CCNC: 27 U/L (ref 0–31)
BASOPHILS # BLD: 0.05 E9/L (ref 0–0.2)
BASOPHILS NFR BLD: 0.5 % (ref 0–2)
BILIRUB SERPL-MCNC: 0.3 MG/DL (ref 0–1.2)
BILIRUB UR QL STRIP: NEGATIVE
BUN SERPL-MCNC: 7 MG/DL (ref 6–20)
CALCIUM SERPL-MCNC: 9.8 MG/DL (ref 8.6–10.2)
CHLORIDE SERPL-SCNC: 106 MMOL/L (ref 98–107)
CLARITY UR: CLEAR
CO2 SERPL-SCNC: 26 MMOL/L (ref 22–29)
COLOR UR: YELLOW
CREAT SERPL-MCNC: 1 MG/DL (ref 0.5–1)
D DIMER: <200 NG/ML DDU
EOSINOPHIL # BLD: 0.17 E9/L (ref 0.05–0.5)
EOSINOPHIL NFR BLD: 1.8 % (ref 0–6)
ERYTHROCYTE [DISTWIDTH] IN BLOOD BY AUTOMATED COUNT: 13 FL (ref 11.5–15)
GLUCOSE SERPL-MCNC: 109 MG/DL (ref 74–99)
GLUCOSE UR STRIP-MCNC: NEGATIVE MG/DL
HCG, URINE, POC: NEGATIVE
HCT VFR BLD AUTO: 42.4 % (ref 34–48)
HGB BLD-MCNC: 12.7 G/DL (ref 11.5–15.5)
HGB UR QL STRIP: NEGATIVE
IMM GRANULOCYTES # BLD: 0.12 E9/L
IMM GRANULOCYTES NFR BLD: 1.3 % (ref 0–5)
KETONES UR STRIP-MCNC: NEGATIVE MG/DL
LACTATE BLDV-SCNC: 1 MMOL/L (ref 0.5–2.2)
LEUKOCYTE ESTERASE UR QL STRIP: NEGATIVE
LIPASE: 25 U/L (ref 13–60)
LYMPHOCYTES # BLD: 1.97 E9/L (ref 1.5–4)
LYMPHOCYTES NFR BLD: 20.5 % (ref 20–42)
Lab: NORMAL
MCH RBC QN AUTO: 28.2 PG (ref 26–35)
MCHC RBC AUTO-ENTMCNC: 30 % (ref 32–34.5)
MCV RBC AUTO: 94.2 FL (ref 80–99.9)
METER GLUCOSE: 132 MG/DL (ref 74–99)
MONOCYTES # BLD: 1.28 E9/L (ref 0.1–0.95)
MONOCYTES NFR BLD: 13.3 % (ref 2–12)
NEGATIVE QC PASS/FAIL: NORMAL
NEUTROPHILS # BLD: 6 E9/L (ref 1.8–7.3)
NEUTS SEG NFR BLD: 62.6 % (ref 43–80)
NITRITE UR QL STRIP: NEGATIVE
PH UR STRIP: 6.5 [PH] (ref 5–9)
PLATELET # BLD AUTO: 281 E9/L (ref 130–450)
PMV BLD AUTO: 10.3 FL (ref 7–12)
POSITIVE QC PASS/FAIL: NORMAL
POTASSIUM SERPL-SCNC: 4.3 MMOL/L (ref 3.5–5)
PROT SERPL-MCNC: 8 G/DL (ref 6.4–8.3)
PROT UR STRIP-MCNC: NEGATIVE MG/DL
RBC # BLD AUTO: 4.5 E12/L (ref 3.5–5.5)
SODIUM SERPL-SCNC: 143 MMOL/L (ref 132–146)
SP GR UR STRIP: <=1.005 (ref 1–1.03)
UROBILINOGEN UR STRIP-ACNC: 0.2 E.U./DL
WBC # BLD: 9.6 E9/L (ref 4.5–11.5)

## 2023-06-09 PROCEDURE — 85378 FIBRIN DEGRADE SEMIQUANT: CPT

## 2023-06-09 PROCEDURE — 93005 ELECTROCARDIOGRAM TRACING: CPT | Performed by: EMERGENCY MEDICINE

## 2023-06-09 PROCEDURE — 83605 ASSAY OF LACTIC ACID: CPT

## 2023-06-09 PROCEDURE — 81003 URINALYSIS AUTO W/O SCOPE: CPT

## 2023-06-09 PROCEDURE — 85025 COMPLETE CBC W/AUTO DIFF WBC: CPT

## 2023-06-09 PROCEDURE — 71046 X-RAY EXAM CHEST 2 VIEWS: CPT

## 2023-06-09 PROCEDURE — 74176 CT ABD & PELVIS W/O CONTRAST: CPT

## 2023-06-09 PROCEDURE — 80053 COMPREHEN METABOLIC PANEL: CPT

## 2023-06-09 PROCEDURE — 83690 ASSAY OF LIPASE: CPT

## 2023-06-09 RX ORDER — POLYETHYLENE GLYCOL 3350 17 G/17G
17 POWDER, FOR SOLUTION ORAL DAILY
Qty: 765 G | Refills: 0 | Status: SHIPPED | OUTPATIENT
Start: 2023-06-09 | End: 2023-07-09

## 2023-06-09 ASSESSMENT — ENCOUNTER SYMPTOMS
EYE REDNESS: 0
VOMITING: 0
SHORTNESS OF BREATH: 0
ABDOMINAL PAIN: 1
NAUSEA: 0

## 2023-06-09 NOTE — ED PROVIDER NOTES
Historian: None    Limitations to history: None      History: The patient presents to the emergency department with a chief complaint chest pain, abdominal pain, chills. The patient states that she received the Depo vera shot yesterday. The patient has no pain or erythema at the injection site. States that she been last night with chest pain, abdominal pain and chills. States her chest pain is located on 1 side of her chest is Sharp, nonradiating. Has been waxing and waning with no particular activating or alleviating factors. The patient does also complain of left lower quadrant abdominal pain. The patient states that the pain is sharp, nonradiating. Nothing  makes it better. Not makes worse. She does have subjective fevers. She denies any nausea or vomiting. Physical exam: The patient is sitting in the bed no acute distress. Heart regular rate and rhythm, left lower quadrant abdominal tenderness. No rebound, rigidity or guarding.     Differential diagnosis includes but not limited to:  Pneumothorax-chest x-ray demonstrates no evidence of pneumothorax  Pulmonary [id number] less than 200  Colitis-no evidence of colitis on CT imaging  UTI-urinalysis unremarkable  Ureteral stone-no evidence of ureteral stone on CT imaging  Pyelonephritis-urinalysis unremarkable with no evidence of pyelonephritis on CT imaging  Constipation-patient did have CT imaging demonstrate constipation-we will treat the patient with MiraLAX    Records reviewed: Reviewed patient's medical record she was admitted on 11/9/2022 for severe bipolar rojas      Patient treated with   Medications - No data to display      Labs independently interpreted and reviewed by me demonstrate:  CBC-unremarkable  CMP-unremarkable  Lipase unremarkable-lactic acid 1.0  D-dimer less than 200  Urinalysis-unremarkable  Urine pregnancy-negative    Imaging reviewed and interpreted by me demonstrates:  Chest x-ray-no acute process  CT abdomen pelvis

## 2023-06-11 LAB
EKG ATRIAL RATE: 74 BPM
EKG P AXIS: 67 DEGREES
EKG P-R INTERVAL: 136 MS
EKG Q-T INTERVAL: 340 MS
EKG QRS DURATION: 80 MS
EKG QTC CALCULATION (BAZETT): 377 MS
EKG R AXIS: 97 DEGREES
EKG T AXIS: -32 DEGREES
EKG VENTRICULAR RATE: 74 BPM

## 2023-07-26 DIAGNOSIS — R56.9 SEIZURES (HCC): ICD-10-CM

## 2023-07-26 RX ORDER — LACOSAMIDE 150 MG/1
TABLET ORAL
Qty: 62 TABLET | Refills: 4 | Status: SHIPPED | OUTPATIENT
Start: 2023-07-26 | End: 2023-08-25

## 2024-01-25 DIAGNOSIS — R56.9 SEIZURES (HCC): ICD-10-CM

## 2024-01-30 RX ORDER — LACOSAMIDE 150 MG/1
TABLET ORAL
Qty: 58 TABLET | Refills: 4 | Status: SHIPPED | OUTPATIENT
Start: 2024-01-30 | End: 2024-02-29

## 2024-04-18 ENCOUNTER — APPOINTMENT (OUTPATIENT)
Dept: CT IMAGING | Age: 24
End: 2024-04-18
Payer: COMMERCIAL

## 2024-04-18 ENCOUNTER — HOSPITAL ENCOUNTER (EMERGENCY)
Age: 24
Discharge: HOME | End: 2024-04-18
Attending: EMERGENCY MEDICINE
Payer: COMMERCIAL

## 2024-04-18 VITALS
TEMPERATURE: 97.7 F | SYSTOLIC BLOOD PRESSURE: 139 MMHG | RESPIRATION RATE: 16 BRPM | OXYGEN SATURATION: 96 % | DIASTOLIC BLOOD PRESSURE: 76 MMHG | BODY MASS INDEX: 49.76 KG/M2 | WEIGHT: 293 LBS | HEART RATE: 68 BPM

## 2024-04-18 DIAGNOSIS — N30.00 ACUTE CYSTITIS WITHOUT HEMATURIA: Primary | ICD-10-CM

## 2024-04-18 DIAGNOSIS — R51.9 ACUTE NONINTRACTABLE HEADACHE, UNSPECIFIED HEADACHE TYPE: ICD-10-CM

## 2024-04-18 LAB
ANION GAP SERPL CALCULATED.3IONS-SCNC: 12 MMOL/L (ref 7–16)
BASOPHILS # BLD: 0.05 K/UL (ref 0–0.2)
BASOPHILS NFR BLD: 1 % (ref 0–2)
BILIRUB UR QL STRIP: NEGATIVE
BUN SERPL-MCNC: 5 MG/DL (ref 6–20)
CALCIUM SERPL-MCNC: 9.7 MG/DL (ref 8.6–10.2)
CHLORIDE SERPL-SCNC: 106 MMOL/L (ref 98–107)
CLARITY UR: CLEAR
CO2 SERPL-SCNC: 21 MMOL/L (ref 22–29)
COLOR UR: YELLOW
CREAT SERPL-MCNC: 1 MG/DL (ref 0.5–1)
EOSINOPHIL # BLD: 0.29 K/UL (ref 0.05–0.5)
EOSINOPHILS RELATIVE PERCENT: 4 % (ref 0–6)
EPI CELLS #/AREA URNS HPF: ABNORMAL /HPF
ERYTHROCYTE [DISTWIDTH] IN BLOOD BY AUTOMATED COUNT: 14.1 % (ref 11.5–15)
GFR SERPL CREATININE-BSD FRML MDRD: 81 ML/MIN/1.73M2
GLUCOSE SERPL-MCNC: 144 MG/DL (ref 74–99)
GLUCOSE UR STRIP-MCNC: NEGATIVE MG/DL
HCG, URINE, POC: NEGATIVE
HCT VFR BLD AUTO: 38.7 % (ref 34–48)
HGB BLD-MCNC: 11.9 G/DL (ref 11.5–15.5)
HGB UR QL STRIP.AUTO: NEGATIVE
IMM GRANULOCYTES # BLD AUTO: 0.15 K/UL (ref 0–0.58)
IMM GRANULOCYTES NFR BLD: 2 % (ref 0–5)
KETONES UR STRIP-MCNC: NEGATIVE MG/DL
LACTATE BLDV-SCNC: 1.7 MMOL/L (ref 0.5–2.2)
LEUKOCYTE ESTERASE UR QL STRIP: ABNORMAL
LYMPHOCYTES NFR BLD: 2.84 K/UL (ref 1.5–4)
LYMPHOCYTES RELATIVE PERCENT: 36 % (ref 20–42)
Lab: NORMAL
MAGNESIUM SERPL-MCNC: 2.2 MG/DL (ref 1.6–2.6)
MCH RBC QN AUTO: 27 PG (ref 26–35)
MCHC RBC AUTO-ENTMCNC: 30.7 G/DL (ref 32–34.5)
MCV RBC AUTO: 87.8 FL (ref 80–99.9)
MONOCYTES NFR BLD: 1.03 K/UL (ref 0.1–0.95)
MONOCYTES NFR BLD: 13 % (ref 2–12)
NEGATIVE QC PASS/FAIL: NORMAL
NEUTROPHILS NFR BLD: 45 % (ref 43–80)
NEUTS SEG NFR BLD: 3.62 K/UL (ref 1.8–7.3)
NITRITE UR QL STRIP: NEGATIVE
PH UR STRIP: 7 [PH] (ref 5–9)
PLATELET # BLD AUTO: 252 K/UL (ref 130–450)
PMV BLD AUTO: 10.6 FL (ref 7–12)
POSITIVE QC PASS/FAIL: NORMAL
POTASSIUM SERPL-SCNC: 4.1 MMOL/L (ref 3.5–5)
PROT UR STRIP-MCNC: NEGATIVE MG/DL
RBC # BLD AUTO: 4.41 M/UL (ref 3.5–5.5)
RBC #/AREA URNS HPF: ABNORMAL /HPF
SODIUM SERPL-SCNC: 139 MMOL/L (ref 132–146)
SP GR UR STRIP: <1.005 (ref 1–1.03)
UROBILINOGEN UR STRIP-ACNC: 0.2 EU/DL (ref 0–1)
WBC #/AREA URNS HPF: ABNORMAL /HPF
WBC OTHER # BLD: 8 K/UL (ref 4.5–11.5)

## 2024-04-18 PROCEDURE — 96375 TX/PRO/DX INJ NEW DRUG ADDON: CPT

## 2024-04-18 PROCEDURE — 83735 ASSAY OF MAGNESIUM: CPT

## 2024-04-18 PROCEDURE — 81001 URINALYSIS AUTO W/SCOPE: CPT

## 2024-04-18 PROCEDURE — 87086 URINE CULTURE/COLONY COUNT: CPT

## 2024-04-18 PROCEDURE — 96374 THER/PROPH/DIAG INJ IV PUSH: CPT

## 2024-04-18 PROCEDURE — 70450 CT HEAD/BRAIN W/O DYE: CPT

## 2024-04-18 PROCEDURE — 83605 ASSAY OF LACTIC ACID: CPT

## 2024-04-18 PROCEDURE — 85025 COMPLETE CBC W/AUTO DIFF WBC: CPT

## 2024-04-18 PROCEDURE — 99284 EMERGENCY DEPT VISIT MOD MDM: CPT

## 2024-04-18 PROCEDURE — 2580000003 HC RX 258

## 2024-04-18 PROCEDURE — 6360000002 HC RX W HCPCS

## 2024-04-18 PROCEDURE — 96361 HYDRATE IV INFUSION ADD-ON: CPT

## 2024-04-18 PROCEDURE — 80048 BASIC METABOLIC PNL TOTAL CA: CPT

## 2024-04-18 RX ORDER — 0.9 % SODIUM CHLORIDE 0.9 %
1000 INTRAVENOUS SOLUTION INTRAVENOUS ONCE
Status: COMPLETED | OUTPATIENT
Start: 2024-04-18 | End: 2024-04-18

## 2024-04-18 RX ORDER — PROCHLORPERAZINE EDISYLATE 5 MG/ML
10 INJECTION INTRAMUSCULAR; INTRAVENOUS ONCE
Status: COMPLETED | OUTPATIENT
Start: 2024-04-18 | End: 2024-04-18

## 2024-04-18 RX ORDER — DIPHENHYDRAMINE HYDROCHLORIDE 50 MG/ML
25 INJECTION INTRAMUSCULAR; INTRAVENOUS ONCE
Status: COMPLETED | OUTPATIENT
Start: 2024-04-18 | End: 2024-04-18

## 2024-04-18 RX ORDER — CEFDINIR 300 MG/1
300 CAPSULE ORAL 2 TIMES DAILY
Qty: 20 CAPSULE | Refills: 0 | Status: SHIPPED | OUTPATIENT
Start: 2024-04-18 | End: 2024-04-28

## 2024-04-18 RX ADMIN — DIPHENHYDRAMINE HYDROCHLORIDE 25 MG: 50 INJECTION, SOLUTION INTRAMUSCULAR; INTRAVENOUS at 16:40

## 2024-04-18 RX ADMIN — PROCHLORPERAZINE EDISYLATE 10 MG: 5 INJECTION INTRAMUSCULAR; INTRAVENOUS at 16:40

## 2024-04-18 RX ADMIN — WATER 1000 MG: 1 INJECTION INTRAMUSCULAR; INTRAVENOUS; SUBCUTANEOUS at 19:44

## 2024-04-18 RX ADMIN — SODIUM CHLORIDE 1000 ML: 9 INJECTION, SOLUTION INTRAVENOUS at 16:40

## 2024-04-18 ASSESSMENT — PAIN - FUNCTIONAL ASSESSMENT: PAIN_FUNCTIONAL_ASSESSMENT: NONE - DENIES PAIN

## 2024-04-18 NOTE — ED PROVIDER NOTES
23-year-old female with history of bipolar, intellectual disability,Hypertension, ADHD, autism, seizures.  She is with her caretaker who stated that blood pressure medications was changed however not for 1 class.  She comes emerged part for complaints of headache.  She states that headaches been going on intermittently for 2 and half weeks describes it as a frontal and occipital headache with throbbing sensation that does not radiate.  She comes emerged department today because this is the first time she woke up in the morning with a headache and nausea but no vomiting.  She said the pain severity remained constant throughout severity remained constant throughout the day.  No aggravating deviating it is noted.  No pain extremity with body movement.  She also reports urinary symptoms for the past week initially was burning sensation while urinating now with pain while urinating, urinary frequency and urinary urgency as well.  Otherwise she denies the following: Double vision, blurry vision, chest pain, shortness of breath, fever, chills.        Chief Complaint   Patient presents with    Hypertension     Starting today with a headache. Caretaker states that her B/P meds have been changed recently.        Review of Systems   Pert staetd in the hpi above   Physical Exam  Vitals reviewed.   Constitutional:       General: She is not in acute distress.     Appearance: She is not ill-appearing.   HENT:      Head: Normocephalic.      Right Ear: External ear normal.      Left Ear: External ear normal.      Nose: Nose normal.      Mouth/Throat:      Mouth: Mucous membranes are moist.   Eyes:      General:         Right eye: No discharge.         Left eye: No discharge.      Conjunctiva/sclera: Conjunctivae normal.   Cardiovascular:      Rate and Rhythm: Normal rate and regular rhythm.      Heart sounds:      No friction rub. No gallop.   Pulmonary:      Effort: No respiratory distress.      Breath sounds: No stridor.

## 2024-04-19 LAB
MICROORGANISM SPEC CULT: ABNORMAL
SPECIMEN DESCRIPTION: ABNORMAL

## 2024-05-02 ENCOUNTER — HOSPITAL ENCOUNTER (OUTPATIENT)
Dept: ULTRASOUND IMAGING | Age: 24
Discharge: HOME | End: 2024-05-04
Payer: COMMERCIAL

## 2024-05-02 ENCOUNTER — HOSPITAL ENCOUNTER (EMERGENCY)
Age: 24
Discharge: HOME | End: 2024-05-02
Attending: EMERGENCY MEDICINE
Payer: COMMERCIAL

## 2024-05-02 ENCOUNTER — HOSPITAL ENCOUNTER (OUTPATIENT)
Age: 24
Discharge: HOME | End: 2024-05-04
Payer: COMMERCIAL

## 2024-05-02 ENCOUNTER — APPOINTMENT (OUTPATIENT)
Dept: GENERAL RADIOLOGY | Age: 24
End: 2024-05-02
Payer: COMMERCIAL

## 2024-05-02 VITALS
OXYGEN SATURATION: 95 % | TEMPERATURE: 98.7 F | WEIGHT: 293 LBS | HEART RATE: 80 BPM | BODY MASS INDEX: 49.76 KG/M2 | DIASTOLIC BLOOD PRESSURE: 72 MMHG | SYSTOLIC BLOOD PRESSURE: 123 MMHG | RESPIRATION RATE: 16 BRPM

## 2024-05-02 DIAGNOSIS — R60.0 PERIPHERAL EDEMA: ICD-10-CM

## 2024-05-02 DIAGNOSIS — R60.0 PERIPHERAL EDEMA: Primary | ICD-10-CM

## 2024-05-02 LAB
ANION GAP SERPL CALCULATED.3IONS-SCNC: 10 MMOL/L (ref 7–16)
BASOPHILS # BLD: 0.03 K/UL (ref 0–0.2)
BASOPHILS NFR BLD: 0 % (ref 0–2)
BUN SERPL-MCNC: 8 MG/DL (ref 6–20)
CALCIUM SERPL-MCNC: 9.9 MG/DL (ref 8.6–10.2)
CHLORIDE SERPL-SCNC: 106 MMOL/L (ref 98–107)
CO2 SERPL-SCNC: 25 MMOL/L (ref 22–29)
CREAT SERPL-MCNC: 1 MG/DL (ref 0.5–1)
EOSINOPHIL # BLD: 0.34 K/UL (ref 0.05–0.5)
EOSINOPHILS RELATIVE PERCENT: 5 % (ref 0–6)
ERYTHROCYTE [DISTWIDTH] IN BLOOD BY AUTOMATED COUNT: 15.2 % (ref 11.5–15)
GFR, ESTIMATED: 82 ML/MIN/1.73M2
GLUCOSE SERPL-MCNC: 112 MG/DL (ref 74–99)
HCT VFR BLD AUTO: 38.9 % (ref 34–48)
HGB BLD-MCNC: 12 G/DL (ref 11.5–15.5)
IMM GRANULOCYTES # BLD AUTO: 0.13 K/UL (ref 0–0.58)
IMM GRANULOCYTES NFR BLD: 2 % (ref 0–5)
LYMPHOCYTES NFR BLD: 2.91 K/UL (ref 1.5–4)
LYMPHOCYTES RELATIVE PERCENT: 41 % (ref 20–42)
MAGNESIUM SERPL-MCNC: 2.1 MG/DL (ref 1.6–2.6)
MCH RBC QN AUTO: 27.1 PG (ref 26–35)
MCHC RBC AUTO-ENTMCNC: 30.8 G/DL (ref 32–34.5)
MCV RBC AUTO: 87.8 FL (ref 80–99.9)
MONOCYTES NFR BLD: 0.93 K/UL (ref 0.1–0.95)
MONOCYTES NFR BLD: 13 % (ref 2–12)
NEUTROPHILS NFR BLD: 39 % (ref 43–80)
NEUTS SEG NFR BLD: 2.7 K/UL (ref 1.8–7.3)
PLATELET # BLD AUTO: 246 K/UL (ref 130–450)
PMV BLD AUTO: 10.6 FL (ref 7–12)
POTASSIUM SERPL-SCNC: 4.2 MMOL/L (ref 3.5–5)
RBC # BLD AUTO: 4.43 M/UL (ref 3.5–5.5)
SODIUM SERPL-SCNC: 141 MMOL/L (ref 132–146)
WBC OTHER # BLD: 7 K/UL (ref 4.5–11.5)

## 2024-05-02 PROCEDURE — 80048 BASIC METABOLIC PNL TOTAL CA: CPT

## 2024-05-02 PROCEDURE — 99284 EMERGENCY DEPT VISIT MOD MDM: CPT

## 2024-05-02 PROCEDURE — 73130 X-RAY EXAM OF HAND: CPT

## 2024-05-02 PROCEDURE — 85025 COMPLETE CBC W/AUTO DIFF WBC: CPT

## 2024-05-02 PROCEDURE — 83735 ASSAY OF MAGNESIUM: CPT

## 2024-05-02 PROCEDURE — 93971 EXTREMITY STUDY: CPT

## 2024-05-02 PROCEDURE — 93970 EXTREMITY STUDY: CPT

## 2024-05-02 RX ORDER — FLUOXETINE HYDROCHLORIDE 20 MG/1
20 CAPSULE ORAL DAILY
COMMUNITY

## 2024-05-02 RX ORDER — PRAZOSIN HYDROCHLORIDE 5 MG/1
5 CAPSULE ORAL NIGHTLY
COMMUNITY

## 2024-05-02 RX ORDER — NAPROXEN 375 MG/1
375 TABLET ORAL 2 TIMES DAILY WITH MEALS
Qty: 60 TABLET | Refills: 3 | Status: SHIPPED | OUTPATIENT
Start: 2024-05-02

## 2024-05-02 RX ORDER — METOPROLOL SUCCINATE 25 MG/1
25 TABLET, EXTENDED RELEASE ORAL DAILY
COMMUNITY

## 2024-05-02 RX ORDER — AMLODIPINE BESYLATE 5 MG/1
5 TABLET ORAL DAILY
COMMUNITY

## 2024-05-03 NOTE — ED PROVIDER NOTES
HPI:  5/2/24,   Time: 8:55 PM EDT       Kanwal Bragg is a 23 y.o. female presenting to the ED for right hand swollen, beginning 1 day ago.  The complaint has been persistent, mild in severity, and worsened by nothing.  Brought in by private vehicle.  No trauma.  Does complain of pain.  Chronic bilateral lower extremity swelling.  No chest pain or shortness of breath no fever chills or sweats    Review of Systems:   Pertinent positives and negatives are stated within HPI, all other systems reviewed and are negative.          --------------------------------------------- PAST HISTORY ---------------------------------------------  Past Medical History:  has a past medical history of ADHD, Autism disorder, Colitis, Schizophrenia (HCC), and Seizures ([facility]).    Past Surgical History:  has a past surgical history that includes Breast surgery (Right) and laparoscopy (Left, 8/9/2019).    Social History:  reports that she has never smoked. She has never used smokeless tobacco. She reports that she does not drink alcohol and does not use drugs.    Family History: family history includes Diabetes in her mother; Other in her mother.     The patient’s home medications have been reviewed.    Allergies: Dye [barium-containing compounds], Sulfa antibiotics, and Versed [midazolam]        ---------------------------------------------------PHYSICAL EXAM--------------------------------------    Constitutional/General: Alert and oriented x3, well appearing, non toxic in NAD  Head: Normocephalic and atraumatic  Eyes: PERRL, EOMI, conjunctive normal, sclera non icteric  Mouth: Oropharynx clear, handling secretions,   Neck: Supple, full ROM,  Respiratory:  Not in respiratory distress  Cardiovascular:  Regular rate. Regular rhythm. . 2+ distal pulses  Chest: No chest wall tenderness  GI:  Abdomen Soft, Non tender, Non distended.    Musculoskeletal: Moves all extremities x 4. Warm and well perfused, right hand diffusely swollen, 2+ pedal

## 2024-05-03 NOTE — ED NOTES
Called [facility] ultrasound and informed that patient on the way over for ultrasound of Rt Upper and Rt Lower extremity and will be waiting for results.

## 2024-05-06 ENCOUNTER — APPOINTMENT (OUTPATIENT)
Dept: CT IMAGING | Age: 24
End: 2024-05-06
Payer: COMMERCIAL

## 2024-05-06 ENCOUNTER — HOSPITAL ENCOUNTER (EMERGENCY)
Age: 24
Discharge: ANOTHER HOSPITAL | End: 2024-05-06
Attending: STUDENT IN AN ORGANIZED HEALTH CARE EDUCATION/TRAINING PROGRAM
Payer: COMMERCIAL

## 2024-05-06 VITALS
HEART RATE: 78 BPM | TEMPERATURE: 98.3 F | OXYGEN SATURATION: 97 % | RESPIRATION RATE: 16 BRPM | BODY MASS INDEX: 49.92 KG/M2 | WEIGHT: 293 LBS | SYSTOLIC BLOOD PRESSURE: 148 MMHG | DIASTOLIC BLOOD PRESSURE: 78 MMHG

## 2024-05-06 DIAGNOSIS — L03.213 PERIORBITAL CELLULITIS OF RIGHT EYE: Primary | ICD-10-CM

## 2024-05-06 DIAGNOSIS — R51.9 RIGHT SIDED FACIAL PAIN: ICD-10-CM

## 2024-05-06 DIAGNOSIS — R22.0 RIGHT FACIAL SWELLING: ICD-10-CM

## 2024-05-06 LAB
ALBUMIN SERPL-MCNC: 4.2 G/DL (ref 3.5–5.2)
ALP SERPL-CCNC: 59 U/L (ref 35–104)
ALT SERPL-CCNC: 16 U/L (ref 0–32)
ANION GAP SERPL CALCULATED.3IONS-SCNC: 11 MMOL/L (ref 7–16)
AST SERPL-CCNC: 21 U/L (ref 0–31)
BASOPHILS # BLD: 0.05 K/UL (ref 0–0.2)
BASOPHILS NFR BLD: 1 % (ref 0–2)
BILIRUB SERPL-MCNC: 0.2 MG/DL (ref 0–1.2)
BUN SERPL-MCNC: 7 MG/DL (ref 6–20)
CALCIUM SERPL-MCNC: 9.6 MG/DL (ref 8.6–10.2)
CHLORIDE SERPL-SCNC: 106 MMOL/L (ref 98–107)
CO2 SERPL-SCNC: 24 MMOL/L (ref 22–29)
CREAT SERPL-MCNC: 1 MG/DL (ref 0.5–1)
EOSINOPHIL # BLD: 0.4 K/UL (ref 0.05–0.5)
EOSINOPHILS RELATIVE PERCENT: 5 % (ref 0–6)
ERYTHROCYTE [DISTWIDTH] IN BLOOD BY AUTOMATED COUNT: 15.4 % (ref 11.5–15)
GFR, ESTIMATED: 87 ML/MIN/1.73M2
GLUCOSE SERPL-MCNC: 109 MG/DL (ref 74–99)
HCG UR QL: NEGATIVE
HCT VFR BLD AUTO: 39.6 % (ref 34–48)
HGB BLD-MCNC: 12.1 G/DL (ref 11.5–15.5)
IMM GRANULOCYTES # BLD AUTO: 0.16 K/UL (ref 0–0.58)
IMM GRANULOCYTES NFR BLD: 2 % (ref 0–5)
LACTATE BLDV-SCNC: 1.1 MMOL/L (ref 0.5–1.9)
LYMPHOCYTES NFR BLD: 2.72 K/UL (ref 1.5–4)
LYMPHOCYTES RELATIVE PERCENT: 36 % (ref 20–42)
MCH RBC QN AUTO: 27 PG (ref 26–35)
MCHC RBC AUTO-ENTMCNC: 30.6 G/DL (ref 32–34.5)
MCV RBC AUTO: 88.4 FL (ref 80–99.9)
MONOCYTES NFR BLD: 0.92 K/UL (ref 0.1–0.95)
MONOCYTES NFR BLD: 12 % (ref 2–12)
NEUTROPHILS NFR BLD: 45 % (ref 43–80)
NEUTS SEG NFR BLD: 3.41 K/UL (ref 1.8–7.3)
PLATELET # BLD AUTO: 239 K/UL (ref 130–450)
PMV BLD AUTO: 10.8 FL (ref 7–12)
POTASSIUM SERPL-SCNC: 3.9 MMOL/L (ref 3.5–5)
PROT SERPL-MCNC: 7.4 G/DL (ref 6.4–8.3)
RBC # BLD AUTO: 4.48 M/UL (ref 3.5–5.5)
SODIUM SERPL-SCNC: 141 MMOL/L (ref 132–146)
WBC OTHER # BLD: 7.7 K/UL (ref 4.5–11.5)

## 2024-05-06 PROCEDURE — 80053 COMPREHEN METABOLIC PANEL: CPT

## 2024-05-06 PROCEDURE — 84703 CHORIONIC GONADOTROPIN ASSAY: CPT

## 2024-05-06 PROCEDURE — 6360000002 HC RX W HCPCS

## 2024-05-06 PROCEDURE — 83605 ASSAY OF LACTIC ACID: CPT

## 2024-05-06 PROCEDURE — 2580000003 HC RX 258

## 2024-05-06 PROCEDURE — A4216 STERILE WATER/SALINE, 10 ML: HCPCS

## 2024-05-06 PROCEDURE — 87040 BLOOD CULTURE FOR BACTERIA: CPT

## 2024-05-06 PROCEDURE — 6370000000 HC RX 637 (ALT 250 FOR IP)

## 2024-05-06 PROCEDURE — 6360000004 HC RX CONTRAST MEDICATION: Performed by: RADIOLOGY

## 2024-05-06 PROCEDURE — 96374 THER/PROPH/DIAG INJ IV PUSH: CPT

## 2024-05-06 PROCEDURE — 70487 CT MAXILLOFACIAL W/DYE: CPT

## 2024-05-06 PROCEDURE — 2500000003 HC RX 250 WO HCPCS

## 2024-05-06 PROCEDURE — 85025 COMPLETE CBC W/AUTO DIFF WBC: CPT

## 2024-05-06 PROCEDURE — 99285 EMERGENCY DEPT VISIT HI MDM: CPT

## 2024-05-06 PROCEDURE — 96375 TX/PRO/DX INJ NEW DRUG ADDON: CPT

## 2024-05-06 RX ORDER — SODIUM CHLORIDE 0.9 % (FLUSH) 0.9 %
5-40 SYRINGE (ML) INJECTION PRN
Status: DISCONTINUED | OUTPATIENT
Start: 2024-05-06 | End: 2024-05-07 | Stop reason: HOSPADM

## 2024-05-06 RX ORDER — HYDROCODONE BITARTRATE AND ACETAMINOPHEN 5; 325 MG/1; MG/1
1 TABLET ORAL ONCE
Status: COMPLETED | OUTPATIENT
Start: 2024-05-06 | End: 2024-05-06

## 2024-05-06 RX ORDER — SODIUM CHLORIDE 0.9 % (FLUSH) 0.9 %
5-40 SYRINGE (ML) INJECTION EVERY 12 HOURS SCHEDULED
Status: DISCONTINUED | OUTPATIENT
Start: 2024-05-06 | End: 2024-05-07 | Stop reason: HOSPADM

## 2024-05-06 RX ORDER — DIPHENHYDRAMINE HYDROCHLORIDE 50 MG/ML
50 INJECTION INTRAMUSCULAR; INTRAVENOUS ONCE
Status: COMPLETED | OUTPATIENT
Start: 2024-05-06 | End: 2024-05-06

## 2024-05-06 RX ORDER — SODIUM CHLORIDE 9 MG/ML
INJECTION, SOLUTION INTRAVENOUS PRN
Status: DISCONTINUED | OUTPATIENT
Start: 2024-05-06 | End: 2024-05-07 | Stop reason: HOSPADM

## 2024-05-06 RX ORDER — 0.9 % SODIUM CHLORIDE 0.9 %
1000 INTRAVENOUS SOLUTION INTRAVENOUS ONCE
Status: COMPLETED | OUTPATIENT
Start: 2024-05-06 | End: 2024-05-06

## 2024-05-06 RX ORDER — LEVETIRACETAM 500 MG/5ML
1000 INJECTION, SOLUTION, CONCENTRATE INTRAVENOUS ONCE
Status: COMPLETED | OUTPATIENT
Start: 2024-05-06 | End: 2024-05-06

## 2024-05-06 RX ORDER — KETOROLAC TROMETHAMINE 30 MG/ML
30 INJECTION, SOLUTION INTRAMUSCULAR; INTRAVENOUS ONCE
Status: COMPLETED | OUTPATIENT
Start: 2024-05-06 | End: 2024-05-06

## 2024-05-06 RX ADMIN — LEVETIRACETAM 1000 MG: 100 INJECTION, SOLUTION INTRAVENOUS at 20:41

## 2024-05-06 RX ADMIN — WATER 2000 MG: 1 INJECTION INTRAMUSCULAR; INTRAVENOUS; SUBCUTANEOUS at 17:27

## 2024-05-06 RX ADMIN — WATER 125 MG: 1 INJECTION INTRAMUSCULAR; INTRAVENOUS; SUBCUTANEOUS at 13:32

## 2024-05-06 RX ADMIN — HYDROCODONE BITARTRATE AND ACETAMINOPHEN 1 TABLET: 5; 325 TABLET ORAL at 17:26

## 2024-05-06 RX ADMIN — FAMOTIDINE 20 MG: 10 INJECTION, SOLUTION INTRAVENOUS at 13:28

## 2024-05-06 RX ADMIN — KETOROLAC TROMETHAMINE 30 MG: 30 INJECTION, SOLUTION INTRAMUSCULAR at 13:38

## 2024-05-06 RX ADMIN — SODIUM CHLORIDE, PRESERVATIVE FREE 10 ML: 5 INJECTION INTRAVENOUS at 20:41

## 2024-05-06 RX ADMIN — DIPHENHYDRAMINE HYDROCHLORIDE 50 MG: 50 INJECTION, SOLUTION INTRAMUSCULAR; INTRAVENOUS at 13:35

## 2024-05-06 RX ADMIN — IOPAMIDOL 75 ML: 755 INJECTION, SOLUTION INTRAVENOUS at 15:25

## 2024-05-06 RX ADMIN — SODIUM CHLORIDE 1000 ML: 9 INJECTION, SOLUTION INTRAVENOUS at 13:40

## 2024-05-06 ASSESSMENT — VISUAL ACUITY
OS: 0
OU: 20/40
OD: 20/70

## 2024-05-06 ASSESSMENT — LIFESTYLE VARIABLES
HOW OFTEN DO YOU HAVE A DRINK CONTAINING ALCOHOL: NEVER
HOW MANY STANDARD DRINKS CONTAINING ALCOHOL DO YOU HAVE ON A TYPICAL DAY: PATIENT DOES NOT DRINK

## 2024-05-06 ASSESSMENT — PAIN - FUNCTIONAL ASSESSMENT: PAIN_FUNCTIONAL_ASSESSMENT: NONE - DENIES PAIN

## 2024-05-06 NOTE — ED PROVIDER NOTES
(electronically signed)    (Please note that portions of this note were completed with a voice recognition program.  Efforts were made to edit the dictations but occasionally words are mis-transcribed.)       Sophie Matos, RALPH - CNP  05/06/24 7922

## 2024-05-07 ENCOUNTER — HOSPITAL ENCOUNTER (INPATIENT)
Age: 24
LOS: 2 days | Discharge: ANOTHER HOSPITAL | End: 2024-05-09
Attending: INTERNAL MEDICINE | Admitting: STUDENT IN AN ORGANIZED HEALTH CARE EDUCATION/TRAINING PROGRAM
Payer: COMMERCIAL

## 2024-05-07 ENCOUNTER — APPOINTMENT (OUTPATIENT)
Dept: GENERAL RADIOLOGY | Age: 24
End: 2024-05-07
Attending: INTERNAL MEDICINE
Payer: COMMERCIAL

## 2024-05-07 DIAGNOSIS — L03.213 PERIORBITAL CELLULITIS OF RIGHT EYE: Primary | ICD-10-CM

## 2024-05-07 PROBLEM — G40.909 SEIZURE DISORDER (HCC): Status: ACTIVE | Noted: 2022-06-23

## 2024-05-07 LAB
B PARAP IS1001 DNA NPH QL NAA+NON-PROBE: NOT DETECTED
B PERT DNA SPEC QL NAA+PROBE: NOT DETECTED
BASOPHILS # BLD: 0.02 K/UL (ref 0–0.2)
BASOPHILS NFR BLD: 0 % (ref 0–2)
BILIRUB UR QL STRIP: NEGATIVE
C PNEUM DNA NPH QL NAA+NON-PROBE: NOT DETECTED
CLARITY UR: CLEAR
COLOR UR: YELLOW
CRP SERPL HS-MCNC: <3 MG/L (ref 0–5)
EOSINOPHIL # BLD: 0 K/UL (ref 0.05–0.5)
EOSINOPHILS RELATIVE PERCENT: 0 % (ref 0–6)
EPI CELLS #/AREA URNS HPF: NORMAL /HPF
ERYTHROCYTE [DISTWIDTH] IN BLOOD BY AUTOMATED COUNT: 15.4 % (ref 11.5–15)
FLUAV RNA NPH QL NAA+NON-PROBE: NOT DETECTED
FLUBV RNA NPH QL NAA+NON-PROBE: NOT DETECTED
GLUCOSE UR STRIP-MCNC: NEGATIVE MG/DL
HADV DNA NPH QL NAA+NON-PROBE: NOT DETECTED
HCOV 229E RNA NPH QL NAA+NON-PROBE: NOT DETECTED
HCOV HKU1 RNA NPH QL NAA+NON-PROBE: NOT DETECTED
HCOV NL63 RNA NPH QL NAA+NON-PROBE: NOT DETECTED
HCOV OC43 RNA NPH QL NAA+NON-PROBE: NOT DETECTED
HCT VFR BLD AUTO: 39.9 % (ref 34–48)
HGB BLD-MCNC: 11.8 G/DL (ref 11.5–15.5)
HGB UR QL STRIP.AUTO: NEGATIVE
HMPV RNA NPH QL NAA+NON-PROBE: NOT DETECTED
HPIV1 RNA NPH QL NAA+NON-PROBE: NOT DETECTED
HPIV2 RNA NPH QL NAA+NON-PROBE: NOT DETECTED
HPIV3 RNA NPH QL NAA+NON-PROBE: NOT DETECTED
HPIV4 RNA NPH QL NAA+NON-PROBE: NOT DETECTED
IMM GRANULOCYTES # BLD AUTO: 0.16 K/UL (ref 0–0.58)
IMM GRANULOCYTES NFR BLD: 2 % (ref 0–5)
KETONES UR STRIP-MCNC: NEGATIVE MG/DL
LEUKOCYTE ESTERASE UR QL STRIP: NEGATIVE
LYMPHOCYTES NFR BLD: 1.45 K/UL (ref 1.5–4)
LYMPHOCYTES RELATIVE PERCENT: 15 % (ref 20–42)
M PNEUMO DNA NPH QL NAA+NON-PROBE: NOT DETECTED
MCH RBC QN AUTO: 27 PG (ref 26–35)
MCHC RBC AUTO-ENTMCNC: 29.6 G/DL (ref 32–34.5)
MCV RBC AUTO: 91.3 FL (ref 80–99.9)
MONOCYTES NFR BLD: 0.79 K/UL (ref 0.1–0.95)
MONOCYTES NFR BLD: 8 % (ref 2–12)
NEUTROPHILS NFR BLD: 75 % (ref 43–80)
NEUTS SEG NFR BLD: 7.18 K/UL (ref 1.8–7.3)
NITRITE UR QL STRIP: NEGATIVE
PH UR STRIP: 6.5 [PH] (ref 5–9)
PLATELET # BLD AUTO: 235 K/UL (ref 130–450)
PMV BLD AUTO: 11.1 FL (ref 7–12)
PROT UR STRIP-MCNC: NEGATIVE MG/DL
RBC # BLD AUTO: 4.37 M/UL (ref 3.5–5.5)
RBC #/AREA URNS HPF: NORMAL /HPF
RSV RNA NPH QL NAA+NON-PROBE: NOT DETECTED
RV+EV RNA NPH QL NAA+NON-PROBE: NOT DETECTED
SARS-COV-2 RNA NPH QL NAA+NON-PROBE: NOT DETECTED
SP GR UR STRIP: 1.01 (ref 1–1.03)
SPECIMEN DESCRIPTION: NORMAL
SPECIMEN SOURCE: NORMAL
STREP A, MOLECULAR: NEGATIVE
UROBILINOGEN UR STRIP-ACNC: 0.2 EU/DL (ref 0–1)
WBC #/AREA URNS HPF: NORMAL /HPF
WBC OTHER # BLD: 9.6 K/UL (ref 4.5–11.5)

## 2024-05-07 PROCEDURE — 2500000003 HC RX 250 WO HCPCS: Performed by: INTERNAL MEDICINE

## 2024-05-07 PROCEDURE — 86140 C-REACTIVE PROTEIN: CPT

## 2024-05-07 PROCEDURE — 1200000000 HC SEMI PRIVATE

## 2024-05-07 PROCEDURE — 6370000000 HC RX 637 (ALT 250 FOR IP): Performed by: INTERNAL MEDICINE

## 2024-05-07 PROCEDURE — 82308 ASSAY OF CALCITONIN: CPT

## 2024-05-07 PROCEDURE — 2580000003 HC RX 258: Performed by: INTERNAL MEDICINE

## 2024-05-07 PROCEDURE — 6360000002 HC RX W HCPCS: Performed by: STUDENT IN AN ORGANIZED HEALTH CARE EDUCATION/TRAINING PROGRAM

## 2024-05-07 PROCEDURE — 87651 STREP A DNA AMP PROBE: CPT

## 2024-05-07 PROCEDURE — 6360000002 HC RX W HCPCS: Performed by: NURSE PRACTITIONER

## 2024-05-07 PROCEDURE — 0202U NFCT DS 22 TRGT SARS-COV-2: CPT

## 2024-05-07 PROCEDURE — 2580000003 HC RX 258: Performed by: STUDENT IN AN ORGANIZED HEALTH CARE EDUCATION/TRAINING PROGRAM

## 2024-05-07 PROCEDURE — 71045 X-RAY EXAM CHEST 1 VIEW: CPT

## 2024-05-07 PROCEDURE — 81001 URINALYSIS AUTO W/SCOPE: CPT

## 2024-05-07 PROCEDURE — 99222 1ST HOSP IP/OBS MODERATE 55: CPT | Performed by: INTERNAL MEDICINE

## 2024-05-07 PROCEDURE — 87086 URINE CULTURE/COLONY COUNT: CPT

## 2024-05-07 PROCEDURE — 85025 COMPLETE CBC W/AUTO DIFF WBC: CPT

## 2024-05-07 PROCEDURE — 6360000002 HC RX W HCPCS: Performed by: INTERNAL MEDICINE

## 2024-05-07 RX ORDER — LANOLIN ALCOHOL/MO/W.PET/CERES
3 CREAM (GRAM) TOPICAL NIGHTLY
Status: DISCONTINUED | OUTPATIENT
Start: 2024-05-07 | End: 2024-05-09 | Stop reason: HOSPADM

## 2024-05-07 RX ORDER — PRAZOSIN HYDROCHLORIDE 5 MG/1
5 CAPSULE ORAL NIGHTLY
Status: DISCONTINUED | OUTPATIENT
Start: 2024-05-07 | End: 2024-05-07 | Stop reason: SDUPTHER

## 2024-05-07 RX ORDER — DIVALPROEX SODIUM 250 MG/1
500 TABLET, DELAYED RELEASE ORAL 3 TIMES DAILY
Status: DISCONTINUED | OUTPATIENT
Start: 2024-05-07 | End: 2024-05-09 | Stop reason: HOSPADM

## 2024-05-07 RX ORDER — PROMETHAZINE HYDROCHLORIDE 25 MG/ML
12.5 INJECTION, SOLUTION INTRAMUSCULAR; INTRAVENOUS ONCE
Status: COMPLETED | OUTPATIENT
Start: 2024-05-07 | End: 2024-05-07

## 2024-05-07 RX ORDER — PANTOPRAZOLE SODIUM 40 MG/1
40 TABLET, DELAYED RELEASE ORAL DAILY
Status: DISCONTINUED | OUTPATIENT
Start: 2024-05-07 | End: 2024-05-09 | Stop reason: HOSPADM

## 2024-05-07 RX ORDER — DIPHENHYDRAMINE HYDROCHLORIDE 50 MG/ML
25 INJECTION INTRAMUSCULAR; INTRAVENOUS EVERY 6 HOURS PRN
Status: DISCONTINUED | OUTPATIENT
Start: 2024-05-07 | End: 2024-05-09 | Stop reason: HOSPADM

## 2024-05-07 RX ORDER — HYDROCODONE BITARTRATE AND ACETAMINOPHEN 5; 325 MG/1; MG/1
1 TABLET ORAL EVERY 4 HOURS PRN
Status: DISCONTINUED | OUTPATIENT
Start: 2024-05-07 | End: 2024-05-09 | Stop reason: HOSPADM

## 2024-05-07 RX ORDER — OLANZAPINE 10 MG/1
10 TABLET, ORALLY DISINTEGRATING ORAL 2 TIMES DAILY
Status: DISCONTINUED | OUTPATIENT
Start: 2024-05-07 | End: 2024-05-09 | Stop reason: HOSPADM

## 2024-05-07 RX ORDER — FLUOXETINE HYDROCHLORIDE 20 MG/1
20 CAPSULE ORAL DAILY
Status: DISCONTINUED | OUTPATIENT
Start: 2024-05-07 | End: 2024-05-09 | Stop reason: HOSPADM

## 2024-05-07 RX ORDER — METOPROLOL SUCCINATE 25 MG/1
25 TABLET, EXTENDED RELEASE ORAL DAILY
Status: DISCONTINUED | OUTPATIENT
Start: 2024-05-07 | End: 2024-05-09 | Stop reason: HOSPADM

## 2024-05-07 RX ORDER — LITHIUM CARBONATE 300 MG/1
300 CAPSULE ORAL
Status: DISCONTINUED | OUTPATIENT
Start: 2024-05-07 | End: 2024-05-09 | Stop reason: HOSPADM

## 2024-05-07 RX ORDER — NAPROXEN 250 MG/1
250 TABLET ORAL 2 TIMES DAILY WITH MEALS
Status: DISCONTINUED | OUTPATIENT
Start: 2024-05-07 | End: 2024-05-09 | Stop reason: HOSPADM

## 2024-05-07 RX ORDER — AMLODIPINE BESYLATE 5 MG/1
5 TABLET ORAL DAILY
Status: DISCONTINUED | OUTPATIENT
Start: 2024-05-07 | End: 2024-05-09 | Stop reason: HOSPADM

## 2024-05-07 RX ORDER — LEVETIRACETAM 500 MG/1
1000 TABLET ORAL 2 TIMES DAILY
Status: DISCONTINUED | OUTPATIENT
Start: 2024-05-07 | End: 2024-05-09 | Stop reason: HOSPADM

## 2024-05-07 RX ORDER — NICOTINE 21 MG/24HR
1 PATCH, TRANSDERMAL 24 HOURS TRANSDERMAL DAILY
Status: DISCONTINUED | OUTPATIENT
Start: 2024-05-07 | End: 2024-05-09 | Stop reason: HOSPADM

## 2024-05-07 RX ORDER — NAPROXEN 375 MG/1
375 TABLET ORAL 2 TIMES DAILY WITH MEALS
Status: DISCONTINUED | OUTPATIENT
Start: 2024-05-07 | End: 2024-05-07 | Stop reason: CLARIF

## 2024-05-07 RX ADMIN — DIVALPROEX SODIUM 500 MG: 250 TABLET, DELAYED RELEASE ORAL at 18:21

## 2024-05-07 RX ADMIN — AMPICILLIN SODIUM AND SULBACTAM SODIUM 3000 MG: 2; 1 INJECTION, POWDER, FOR SOLUTION INTRAMUSCULAR; INTRAVENOUS at 15:13

## 2024-05-07 RX ADMIN — MICONAZOLE NITRATE: 20 POWDER TOPICAL at 08:41

## 2024-05-07 RX ADMIN — PRAZOSIN HYDROCHLORIDE 5 MG: 2 CAPSULE ORAL at 02:06

## 2024-05-07 RX ADMIN — SODIUM CHLORIDE 3000 MG: 9 INJECTION, SOLUTION INTRAVENOUS at 06:18

## 2024-05-07 RX ADMIN — NAPROXEN 250 MG: 250 TABLET ORAL at 18:21

## 2024-05-07 RX ADMIN — OLANZAPINE 10 MG: 10 TABLET, ORALLY DISINTEGRATING ORAL at 22:53

## 2024-05-07 RX ADMIN — LEVETIRACETAM 1000 MG: 500 TABLET, FILM COATED ORAL at 08:40

## 2024-05-07 RX ADMIN — LITHIUM CARBONATE 300 MG: 300 CAPSULE, GELATIN COATED ORAL at 08:39

## 2024-05-07 RX ADMIN — FLUOXETINE HYDROCHLORIDE 20 MG: 20 CAPSULE ORAL at 08:40

## 2024-05-07 RX ADMIN — LACOSAMIDE 150 MG: 100 TABLET, FILM COATED ORAL at 02:06

## 2024-05-07 RX ADMIN — DIVALPROEX SODIUM 500 MG: 250 TABLET, DELAYED RELEASE ORAL at 14:53

## 2024-05-07 RX ADMIN — HYDROCODONE BITARTRATE AND ACETAMINOPHEN 1 TABLET: 5; 325 TABLET ORAL at 02:06

## 2024-05-07 RX ADMIN — OLANZAPINE 10 MG: 10 TABLET, ORALLY DISINTEGRATING ORAL at 08:39

## 2024-05-07 RX ADMIN — DIPHENHYDRAMINE HYDROCHLORIDE 25 MG: 50 INJECTION, SOLUTION INTRAMUSCULAR; INTRAVENOUS at 14:52

## 2024-05-07 RX ADMIN — DIVALPROEX SODIUM 500 MG: 250 TABLET, DELAYED RELEASE ORAL at 08:40

## 2024-05-07 RX ADMIN — LACOSAMIDE 150 MG: 100 TABLET, FILM COATED ORAL at 22:53

## 2024-05-07 RX ADMIN — LEVETIRACETAM 1000 MG: 500 TABLET, FILM COATED ORAL at 22:53

## 2024-05-07 RX ADMIN — PROMETHAZINE HYDROCHLORIDE 12.5 MG: 25 INJECTION INTRAMUSCULAR; INTRAVENOUS at 21:31

## 2024-05-07 RX ADMIN — LITHIUM CARBONATE 300 MG: 300 CAPSULE, GELATIN COATED ORAL at 18:21

## 2024-05-07 RX ADMIN — PANTOPRAZOLE SODIUM 40 MG: 40 TABLET, DELAYED RELEASE ORAL at 08:40

## 2024-05-07 RX ADMIN — MICONAZOLE NITRATE: 20 POWDER TOPICAL at 02:08

## 2024-05-07 RX ADMIN — OLANZAPINE 10 MG: 10 TABLET, ORALLY DISINTEGRATING ORAL at 02:07

## 2024-05-07 RX ADMIN — NAPROXEN 250 MG: 250 TABLET ORAL at 08:40

## 2024-05-07 RX ADMIN — Medication 3 MG: at 02:06

## 2024-05-07 RX ADMIN — LACOSAMIDE 150 MG: 100 TABLET, FILM COATED ORAL at 08:39

## 2024-05-07 RX ADMIN — AMPICILLIN SODIUM AND SULBACTAM SODIUM 3000 MG: 2; 1 INJECTION, POWDER, FOR SOLUTION INTRAMUSCULAR; INTRAVENOUS at 21:20

## 2024-05-07 RX ADMIN — LITHIUM CARBONATE 300 MG: 300 CAPSULE, GELATIN COATED ORAL at 11:49

## 2024-05-07 RX ADMIN — HYDROCODONE BITARTRATE AND ACETAMINOPHEN 1 TABLET: 5; 325 TABLET ORAL at 11:50

## 2024-05-07 RX ADMIN — MICONAZOLE NITRATE: 20 POWDER TOPICAL at 21:23

## 2024-05-07 ASSESSMENT — PAIN DESCRIPTION - ORIENTATION
ORIENTATION: RIGHT
ORIENTATION: RIGHT

## 2024-05-07 ASSESSMENT — PAIN SCALES - GENERAL
PAINLEVEL_OUTOF10: 6
PAINLEVEL_OUTOF10: 8

## 2024-05-07 ASSESSMENT — PAIN DESCRIPTION - PAIN TYPE: TYPE: ACUTE PAIN

## 2024-05-07 ASSESSMENT — PAIN DESCRIPTION - DESCRIPTORS
DESCRIPTORS: ACHING
DESCRIPTORS: ACHING
DESCRIPTORS: ACHING;THROBBING

## 2024-05-07 ASSESSMENT — PAIN DESCRIPTION - LOCATION
LOCATION: HEAD;FACE
LOCATION: EYE;FACE
LOCATION: EYE;FACE

## 2024-05-07 NOTE — CARE COORDINATION
Social Work discharge planning  SW met with pt, no visitors. She is from [facility]. SW spoke to coordinator Sharee [phone]. She said pt has been with them since 4/1/24. She said they give pt her meds. Per Sharee, pt is able to shower and dress on her own. She said pt is incontinent at night, and \"recently had vaginitis and UTI\". She walks independently. Sharee said pt is her own person, no guardian. Involvement with mom and grandmother are minimal at best per group home.   Pt must be on all po meds to return to group home. AT discharge, Sharee [phone] or Director Aniya Tapia [phone] need contacted before 6pm. They will provide transport back to group home if able.   Social Work to follow for support and discharge planning.   Electronically signed by CHELE Madrid on 5/7/2024 at 10:54 AM

## 2024-05-07 NOTE — PROGRESS NOTES
Guero Bragg was ordered ARIPiprazole ER (ABILIFY MAINTENA) which is a nonformulary medication.  This medication will need to be supplied by the patient as the pharmacy does not carry this non-formulary medication.    If the medication has not been administered by 1400 on the following day from the time the order was placed, a pharmacist will follow-up with the nurse of the patient to assess the capability of the patient to bring in the medication.    If it is determined that the patient cannot supply the medication and it is not available to be dispensed from the pharmacy, the provider will be notified.  Braxton Sepulveda RPH  5/7/2024  12:52 AM

## 2024-05-07 NOTE — ACP (ADVANCE CARE PLANNING)
Advance Care Planning   Healthcare Decision Maker:    Primary Decision Maker: Katerin Arreguin - Parent - [phone]    Secondary Decision Maker: Renzo Blackmon - Grandparent - [phone]    Click here to complete Healthcare Decision Makers including selection of the Healthcare Decision Maker Relationship (ie \"Primary\").

## 2024-05-07 NOTE — PROGRESS NOTES
Dr. Delgado notified that multiple scripts were coming up as  on med rec including Depakote, Lithium and Vimpat. Dr. Delgado said it was okay to order these meds still

## 2024-05-07 NOTE — PATIENT CARE CONFERENCE
[facility] Quality Flow/Interdisciplinary Rounds Progress Note        Quality Flow Rounds held on May 7, 2024    Disciplines Attending:  Bedside Nurse, , , and Nursing Unit Leadership    Guero Bragg was admitted on 5/7/2024 12:04 AM    Anticipated Discharge Date:       Disposition:    Shimon Score:  Shimon Scale Score: 20    Readmission Risk              Risk of Unplanned Readmission:  8           Discussed patient goal for the day, patient clinical progression, and barriers to discharge.  The following Goal(s) of the Day/Commitment(s) have been identified:  Other  await consults.      Maria Isabel Reed RN  May 7, 2024

## 2024-05-07 NOTE — PROGRESS NOTES
4 Eyes Skin Assessment     NAME:  Guero Bragg  YOB: 2000  MEDICAL RECORD NUMBER:  [id number]    The patient is being assessed for  Admission    I agree that at least one RN has performed a thorough Head to Toe Skin Assessment on the patient. ALL assessment sites listed below have been assessed.      Areas assessed by both nurses:    Head, Face, Ears, Shoulders, Back, Chest, Arms, Elbows, Hands, Sacrum. Buttock, Coccyx, Ischium, and Legs. Feet and Heels        Does the Patient have a Wound? No noted wound(s)       Shimon Prevention initiated by RN: No  Wound Care Orders initiated by RN: No    Pressure Injury (Stage 3,4, Unstageable, DTI, NWPT, and Complex wounds) if present, place Wound referral order by RN under : No    New Ostomies, if present place, Ostomy referral order under : No     Nurse 1 eSignature: Electronically signed by Lidia Minro RN on 5/7/24 at 1:08 AM EDT    **SHARE this note so that the co-signing nurse can place an eSignature**    Nurse 2 eSignature: Electronically signed by Winifred Palmer RN on 5/7/24 at 1:09 AM EDT

## 2024-05-07 NOTE — PROGRESS NOTES
Paged pharmacy to ask if the po keppra should be given this evening with the pt receiving a dose of IV keppra in the [place] [facility] prior to arrival. Pharmacy said to hold tonight's dose and start again in AM    Electronically signed by Lidia Minor RN on 5/7/2024 at 1:08 AM

## 2024-05-07 NOTE — CONSULTS
[facility]  [facility]    Consultation Note     Admit Date: 5/7/2024 12:04 AM    Reason for Consult:   preseptal cellulitis.    Attending Physician:  Neisha Guthrie MD     Chief Complaint: facial swelling.    HISTORY OF PRESENT ILLNESS:   The patient is a 23 y.o.  female not known to the Infectious Diseases service. The patient presented to ER with one day of URI symptoms and right facial swelling. No fever. Pt was not able to provide much hx. She lives in group home. Apparently had seizure at [facility] ER. Has some lower abdominal pain.     Since admission pt is afebrile HS stable on RA. Labs showed normal CBC,CMP, <3 Crp. Blood cx one set was drawn. RVP negative. CT face showed soft tissue swelling of right orbit. Patient got a dose of IV Unasyn an di got consulted for antibiotic management.     Past Medical History:        Diagnosis Date    ADHD     Autism disorder     Colitis     Schizophrenia (HCC)     Seizures (HCC)        Past Surgical History:        Procedure Laterality Date    BREAST SURGERY Right     lumpectomy    LAPAROSCOPY Left 8/9/2019    LAPAROSCOPY, REMOVAL OF LEFT OVARIAN MASS, PERITONEAL WASHINGS FOR CELL BLOCK performed by Yeimy Sigala MD at [facility] OR       Current Medications:   Scheduled Meds:   amLODIPine  5 mg Oral Daily    divalproex  500 mg Oral TID    ARIPiprazole ER  400 mg IntraMUSCular See Admin Instructions    FLUoxetine  20 mg Oral Daily    lacosamide  150 mg Oral BID    levETIRAcetam  1,000 mg Oral BID    lithium  300 mg Oral TID WC    melatonin  3 mg Oral Nightly    metoprolol succinate  25 mg Oral Daily    miconazole   Topical BID    nicotine  1 patch TransDERmal Daily    OLANZapine zydis  10 mg Oral BID    pantoprazole  40 mg Oral Daily    prazosin  5 mg Oral Nightly    naproxen  250 mg Oral BID WC     Continuous Infusions:  PRN Meds:HYDROcodone 5 mg - acetaminophen    Allergies:  Dye [barium-containing compounds], Sulfa antibiotics, and  Versed [midazolam]    Social History:   Social History     Socioeconomic History    Marital status: Single   Tobacco Use    Smoking status: Never    Smokeless tobacco: Never   Vaping Use    Vaping Use: Never used   Substance and Sexual Activity    Alcohol use: No    Drug use: No    Sexual activity: Never     Social Determinants of Health     Food Insecurity: No Food Insecurity (5/7/2024)    Hunger Vital Sign     Worried About Running Out of Food in the Last Year: Never true     Ran Out of Food in the Last Year: Never true   Transportation Needs: No Transportation Needs (5/7/2024)    PRAPARE - Transportation     Lack of Transportation (Medical): No     Lack of Transportation (Non-Medical): No   Housing Stability: Low Risk  (5/7/2024)    Housing Stability Vital Sign     Unable to Pay for Housing in the Last Year: No     Number of Places Lived in the Last Year: 1     Unstable Housing in the Last Year: No       Family History:       Problem Relation Age of Onset    Diabetes Mother     Other Mother         lupus   . Otherwise non-pertinent to the chief complaint.    REVIEW OF SYSTEMS:    As mentioned in HPI, all other systems negative.       PHYSICAL EXAM:    Vitals:    /60   Pulse 86   Temp 98.9 °F (37.2 °C) (Oral)   Resp 20   Ht 1.651 m (5' 5\")   Wt 136.1 kg (300 lb)   SpO2 96%   BMI 49.92 kg/m²   Constitutional: The patient is awake, alert, and oriented.   Skin: Warm and dry. No rashes were noted. No jaundice.  HEENT: Eyes show round, and reactive pupils. Moist mucous membranes, no ulcerations, no thrush. Right sided facial swelling.   Neck: Supple to movements. No lymphadenopathy.   Chest: No use of accessory muscles to breathe. Symmetrical expansion. Auscultation reveals no wheezing, crackles, or rhonchi.   Cardiovascular: S1 and S2 are rhythmic and regular. No murmurs appreciated.   Abdomen: soft, mild tenderness lower abdomen  Extremities: No clubbing, no cyanosis, no edema.  Musculoskeletal: no gross  focal abnormalities  Neurological: awake alert   Lines: peripheral      CBC+dif:  Recent Labs     05/06/24  1230 05/07/24  0245   WBC 7.7 9.6   HGB 12.1 11.8   HCT 39.6 39.9   MCV 88.4 91.3    235   NEUTROABS 3.41 7.18     Lab Results   Component Value Date    CRP <3.0 05/07/2024     No results found for: \"CRPHS\"  No results found for: \"SEDRATE\"  Lab Results   Component Value Date    ALT 16 05/06/2024    AST 21 05/06/2024    ALKPHOS 59 05/06/2024    BILITOT 0.2 05/06/2024     Lab Results   Component Value Date/Time     05/06/2024 12:30 PM    K 3.9 05/06/2024 12:30 PM    K 4.1 10/01/2022 08:07 PM     05/06/2024 12:30 PM    CO2 24 05/06/2024 12:30 PM    BUN 7 05/06/2024 12:30 PM    CREATININE 1.0 05/06/2024 12:30 PM    GFRAA >60 10/01/2022 08:07 PM    LABGLOM 87 05/06/2024 12:30 PM    LABGLOM 81 04/18/2024 04:06 PM    GLUCOSE 109 05/06/2024 12:30 PM    CALCIUM 9.6 05/06/2024 12:30 PM    BILITOT 0.2 05/06/2024 12:30 PM    ALKPHOS 59 05/06/2024 12:30 PM    AST 21 05/06/2024 12:30 PM    ALT 16 05/06/2024 12:30 PM       Radiology:    CT face:   IMPRESSION:  Mild right lateral periorbital cellulitis. No post septal inflammation or  abscess is appreciated.    Assessment:  Right sided facial cellulitis:   R/o UTI: urinary retention.    Plan:    Started IV Unasyn 3gr q 6  Grp A strep screen, CXR, UA with micro and urine cx.   Monitor labs  Will follow with you    Thank you for having us see this patient in consultation. I will be discussing this case with the treating physicians.      Electronically signed by LEONCIO LUCIANO MD on 5/7/2024 at 8:58 AM

## 2024-05-07 NOTE — H&P
[facility] History and Physical        Chief Complaint:  R eye pain  History of Present Illness   The patient is a 23 y.o. female    Dr Farr- no longer PCP  Hx of seizures, psych- from [facility] home    Presented to [facility] with one day uri symptoms and moderately painful, swelling eye lid and painful EOM- [facility], access center transfer here.    R eye lid swelling- swollen shut, moderately (requiring opiods, toradol didn't work at [facility]) painful which insideously occurred yesterday am,- when she awoke to find her right eye matted shut and right side of her face swollen. Atraumatic progressively painful.  +feverish/ Chills, moderate facial pain, +painful to look around with blurred vision.+, right eye watery drainage, runny nose, nonproductive cough.     While at [facility] noted:  \"While awaiting transportation to [facility], patient did have a breakthrough seizure. She normally takes Depakote 500 mg 3 times a day, Vimpat 150 mg twice a day, and Keppra 1000 mg twice a day. She has not had her afternoon meds. Will give IV Keppra to prevent additional breakthrough seizures. \"    - hx taken from the patient and ER records  REVIEW OF SYSTEMS:  + fevers, chills,    Past Medical History:      Diagnosis Date    ADHD     Autism disorder     Colitis     Schizophrenia (HCC)     Seizures (HCC)        Past Surgical History:        Procedure Laterality Date    BREAST SURGERY Right     lumpectomy    LAPAROSCOPY Left 8/9/2019    LAPAROSCOPY, REMOVAL OF LEFT OVARIAN MASS, PERITONEAL WASHINGS FOR CELL BLOCK performed by Yeimy Sigala MD at [facility] OR       Home Medications:  Prior to Admission medications    Medication Sig Start Date End Date Taking? Authorizing Provider   amLODIPine (NORVASC) 5 MG tablet Take 1 tablet by mouth daily    ProviderGuevara MD   FLUoxetine (PROZAC) 20 MG capsule Take 1 capsule by mouth daily    ProviderGuevara MD   metoprolol succinate 
father/mother

## 2024-05-08 PROBLEM — L03.213 PRESEPTAL CELLULITIS OF RIGHT EYE: Status: RESOLVED | Noted: 2024-05-07 | Resolved: 2024-05-08

## 2024-05-08 LAB — GLUCOSE BLD-MCNC: 249 MG/DL (ref 74–99)

## 2024-05-08 PROCEDURE — 6360000002 HC RX W HCPCS: Performed by: STUDENT IN AN ORGANIZED HEALTH CARE EDUCATION/TRAINING PROGRAM

## 2024-05-08 PROCEDURE — 82962 GLUCOSE BLOOD TEST: CPT

## 2024-05-08 PROCEDURE — 1200000000 HC SEMI PRIVATE

## 2024-05-08 PROCEDURE — 99232 SBSQ HOSP IP/OBS MODERATE 35: CPT | Performed by: STUDENT IN AN ORGANIZED HEALTH CARE EDUCATION/TRAINING PROGRAM

## 2024-05-08 PROCEDURE — 6370000000 HC RX 637 (ALT 250 FOR IP): Performed by: INTERNAL MEDICINE

## 2024-05-08 PROCEDURE — 2580000003 HC RX 258: Performed by: STUDENT IN AN ORGANIZED HEALTH CARE EDUCATION/TRAINING PROGRAM

## 2024-05-08 RX ADMIN — OLANZAPINE 10 MG: 10 TABLET, ORALLY DISINTEGRATING ORAL at 08:39

## 2024-05-08 RX ADMIN — LEVETIRACETAM 1000 MG: 500 TABLET, FILM COATED ORAL at 08:40

## 2024-05-08 RX ADMIN — PANTOPRAZOLE SODIUM 40 MG: 40 TABLET, DELAYED RELEASE ORAL at 08:40

## 2024-05-08 RX ADMIN — AMLODIPINE BESYLATE 5 MG: 5 TABLET ORAL at 08:42

## 2024-05-08 RX ADMIN — LITHIUM CARBONATE 300 MG: 300 CAPSULE, GELATIN COATED ORAL at 17:31

## 2024-05-08 RX ADMIN — DIVALPROEX SODIUM 500 MG: 250 TABLET, DELAYED RELEASE ORAL at 17:31

## 2024-05-08 RX ADMIN — DIVALPROEX SODIUM 500 MG: 250 TABLET, DELAYED RELEASE ORAL at 08:40

## 2024-05-08 RX ADMIN — AMPICILLIN SODIUM AND SULBACTAM SODIUM 3000 MG: 2; 1 INJECTION, POWDER, FOR SOLUTION INTRAMUSCULAR; INTRAVENOUS at 22:02

## 2024-05-08 RX ADMIN — NAPROXEN 250 MG: 250 TABLET ORAL at 17:31

## 2024-05-08 RX ADMIN — LEVETIRACETAM 1000 MG: 500 TABLET, FILM COATED ORAL at 21:55

## 2024-05-08 RX ADMIN — AMPICILLIN SODIUM AND SULBACTAM SODIUM 3000 MG: 2; 1 INJECTION, POWDER, FOR SOLUTION INTRAMUSCULAR; INTRAVENOUS at 15:17

## 2024-05-08 RX ADMIN — NAPROXEN 250 MG: 250 TABLET ORAL at 08:40

## 2024-05-08 RX ADMIN — AMPICILLIN SODIUM AND SULBACTAM SODIUM 3000 MG: 2; 1 INJECTION, POWDER, FOR SOLUTION INTRAMUSCULAR; INTRAVENOUS at 03:20

## 2024-05-08 RX ADMIN — OLANZAPINE 10 MG: 10 TABLET, ORALLY DISINTEGRATING ORAL at 21:55

## 2024-05-08 RX ADMIN — LACOSAMIDE 150 MG: 100 TABLET, FILM COATED ORAL at 08:40

## 2024-05-08 RX ADMIN — DIVALPROEX SODIUM 500 MG: 250 TABLET, DELAYED RELEASE ORAL at 12:16

## 2024-05-08 RX ADMIN — LITHIUM CARBONATE 300 MG: 300 CAPSULE, GELATIN COATED ORAL at 12:16

## 2024-05-08 RX ADMIN — METOPROLOL SUCCINATE 25 MG: 25 TABLET, EXTENDED RELEASE ORAL at 08:40

## 2024-05-08 RX ADMIN — MICONAZOLE NITRATE: 20 POWDER TOPICAL at 21:57

## 2024-05-08 RX ADMIN — HYDROCODONE BITARTRATE AND ACETAMINOPHEN 1 TABLET: 5; 325 TABLET ORAL at 08:52

## 2024-05-08 RX ADMIN — MICONAZOLE NITRATE: 20 POWDER TOPICAL at 08:38

## 2024-05-08 RX ADMIN — LACOSAMIDE 150 MG: 100 TABLET, FILM COATED ORAL at 21:55

## 2024-05-08 RX ADMIN — AMPICILLIN SODIUM AND SULBACTAM SODIUM 3000 MG: 2; 1 INJECTION, POWDER, FOR SOLUTION INTRAMUSCULAR; INTRAVENOUS at 08:38

## 2024-05-08 RX ADMIN — LITHIUM CARBONATE 300 MG: 300 CAPSULE, GELATIN COATED ORAL at 08:40

## 2024-05-08 RX ADMIN — FLUOXETINE HYDROCHLORIDE 20 MG: 20 CAPSULE ORAL at 08:39

## 2024-05-08 ASSESSMENT — PAIN SCALES - GENERAL
PAINLEVEL_OUTOF10: 9
PAINLEVEL_OUTOF10: 6

## 2024-05-08 ASSESSMENT — PAIN DESCRIPTION - LOCATION: LOCATION: FACE

## 2024-05-08 ASSESSMENT — PAIN DESCRIPTION - ORIENTATION: ORIENTATION: RIGHT

## 2024-05-08 ASSESSMENT — PAIN DESCRIPTION - DESCRIPTORS: DESCRIPTORS: ACHING

## 2024-05-08 NOTE — PROGRESS NOTES
Patient complaining of eyes burning. Reached out to Dr. Guthrie via perfect serve, advised to rinse eyes with normal saline. Patient agreeable to this. Electronically signed by Omid Inman RN on 5/8/2024 at 1:30 PM

## 2024-05-08 NOTE — CARE COORDINATION
Social Work discharge planning   H&P indicates developmental disability. SW tried to reach pt's mother Katerin at [phone] but man answering saying wrong number. Tried pt's grandparent Leisa at [phone] but recording states \"restricted and unavailable\". SW called [facility] at [facility] [phone], but only able to leave message to see if they have any additional numbers.  SW needs to talk to mom or NOK re: snf choices in case pt needs iv atb at discharge. She is presently on IV Unasyn q6.   Electronically signed by CHELE Madrid on 5/8/2024 at 2:35 PM

## 2024-05-08 NOTE — PLAN OF CARE
Problem: Safety - Adult  Goal: Free from fall injury  5/8/2024 1016 by Omid Inman RN  Outcome: Progressing  5/8/2024 0246 by Serena Yañez RN  Outcome: Progressing     Problem: Pain  Goal: Verbalizes/displays adequate comfort level or baseline comfort level  5/8/2024 1016 by Omid Inman RN  Outcome: Progressing  5/8/2024 0246 by Serena Yañez RN  Outcome: Progressing     Problem: ABCDS Injury Assessment  Goal: Absence of physical injury  5/8/2024 1016 by Omid Inman RN  Outcome: Progressing  5/8/2024 0246 by Serena Yañez RN  Outcome: Progressing

## 2024-05-08 NOTE — PROGRESS NOTES
[facility] Quality Flow/Interdisciplinary Rounds Progress Note        Quality Flow Rounds held on May 8, 2024    Disciplines Attending:  Bedside Nurse, , , and Nursing Unit Leadership    Guero Bragg was admitted on 5/7/2024 12:04 AM    Anticipated Discharge Date:       Disposition:    Shimon Score:  Shimon Scale Score: 20    Readmission Risk              Risk of Unplanned Readmission:  8           Discussed patient goal for the day, patient clinical progression, and barriers to discharge.  The following Goal(s) of the Day/Commitment(s) have been identified:  Diagnostics - Report Results and Labs - Report Results      Nisa Mendoza RN  May 8, 2024

## 2024-05-08 NOTE — PROGRESS NOTES
[facility] - Hospitalist   Progress Note    Admitting Date and Time: 5/7/2024 12:04 AM  Admit Dx: Periorbital cellulitis [L03.213]    Subjective:    Pt feels a little better, states her swelling is improving but not 100% gone.       ROS: denies fever, chills, cp, sob, n/v, HA unless stated above.     amLODIPine  5 mg Oral Daily    divalproex  500 mg Oral TID    ARIPiprazole ER  400 mg IntraMUSCular See Admin Instructions    FLUoxetine  20 mg Oral Daily    lacosamide  150 mg Oral BID    levETIRAcetam  1,000 mg Oral BID    lithium  300 mg Oral TID WC    melatonin  3 mg Oral Nightly    metoprolol succinate  25 mg Oral Daily    miconazole   Topical BID    nicotine  1 patch TransDERmal Daily    OLANZapine zydis  10 mg Oral BID    pantoprazole  40 mg Oral Daily    prazosin  5 mg Oral Nightly    naproxen  250 mg Oral BID WC    ampicillin-sulbactam  3,000 mg IntraVENous Q6H     HYDROcodone 5 mg - acetaminophen, 1 tablet, Q4H PRN  diphenhydrAMINE, 25 mg, Q6H PRN         Objective:    BP (!) 110/59   Pulse 78   Temp 98.6 °F (37 °C) (Oral)   Resp 16   Ht 1.651 m (5' 5\")   Wt 136.1 kg (300 lb)   SpO2 96%   BMI 49.92 kg/m²     General Appearance: alert and oriented to person, place and time and in no acute distress  Skin: warm and dry  Head: normocephalic and atraumatic. Slight edema to R upper facial area, improved from yesterday  Eyes: pupils equal, round, and reactive to light, extraocular eye movements intact, conjunctivae normal  Neck: neck supple and non tender without mass   Pulmonary/Chest: clear to auscultation bilaterally- no wheezes, rales or rhonchi, normal air movement, no respiratory distress  Cardiovascular: normal rate, normal S1 and S2  Abdomen: soft, non-tender, non-distended, normal bowel sounds  Extremities: no cyanosis, no clubbing and no edema  Neurologic: no cranial nerve deficit and speech normal      Recent Labs     05/06/24  1230      K 3.9      CO2 24   BUN 7   CREATININE

## 2024-05-08 NOTE — PROGRESS NOTES
Notified on call Nurse Practitioner of CXR results. Pt without respiratory symptoms or hypoxia at this time. Instructed to continue to monitor.

## 2024-05-08 NOTE — PROGRESS NOTES
Infectious Disease  Progress Note  [facility]    Chief Complaint: right face cellulitis    Subjective:  she says her eye is better today. Able to open it up. Tolerating antibiotics well.     Scheduled Meds:   amLODIPine  5 mg Oral Daily    divalproex  500 mg Oral TID    ARIPiprazole ER  400 mg IntraMUSCular See Admin Instructions    FLUoxetine  20 mg Oral Daily    lacosamide  150 mg Oral BID    levETIRAcetam  1,000 mg Oral BID    lithium  300 mg Oral TID WC    melatonin  3 mg Oral Nightly    metoprolol succinate  25 mg Oral Daily    miconazole   Topical BID    nicotine  1 patch TransDERmal Daily    OLANZapine zydis  10 mg Oral BID    pantoprazole  40 mg Oral Daily    prazosin  5 mg Oral Nightly    naproxen  250 mg Oral BID WC    ampicillin-sulbactam  3,000 mg IntraVENous Q6H     Continuous Infusions:  PRN Meds:HYDROcodone 5 mg - acetaminophen, diphenhydrAMINE    Prior to Admission medications    Medication Sig Start Date End Date Taking? Authorizing Provider   amLODIPine (NORVASC) 5 MG tablet Take 1 tablet by mouth daily    Guevara Walker MD   FLUoxetine (PROZAC) 20 MG capsule Take 1 capsule by mouth daily    Guevara Walker MD   metoprolol succinate (TOPROL XL) 25 MG extended release tablet Take 1 tablet by mouth daily    Guevara Walker MD   prazosin (MINIPRESS) 5 MG capsule Take 1 capsule by mouth nightly    ProviderGuevara MD   naproxen (NAPROSYN) 375 MG tablet Take 1 tablet by mouth 2 times daily (with meals) 5/2/24   Ford Eastman MD   lacosamide (VIMPAT) 150 MG TABS tablet 1 TAB BY MOUTH TWICE A DAY AT 7AM & 9PM (SEIZURE D/O) 1/30/24 2/29/24  Tamika Dutton APRN - CNP   divalproex (DEPAKOTE) 500 MG DR tablet Take 1 tablet by mouth 3 times daily 3/9/23 4/8/23  Tamika Dutton APRN - CNP   nicotine (NICODERM CQ) 21 MG/24HR Place 1 patch onto the skin daily 11/12/22 12/12/22  Maggy Thomas APRN - CNP   levETIRAcetam (KEPPRA) 1000 MG tablet Take 1 tablet by mouth 2 times daily 10/1/22  3/9/23  Shelley Urrutia APRN - CNP   OLANZapine zydis (ZYPREXA) 10 MG disintegrating tablet Take 1 tablet by mouth in the morning and at bedtime 10/1/22 3/9/23  Shelley Urrutia APRN - CNP   miconazole (MICOTIN) 2 % powder Apply topically 2 times daily. 10/1/22   Shelley Urrutia APRN - CNP   melatonin 3 MG TABS tablet Take 1 tablet by mouth nightly 10/1/22 10/31/22  Shelley Urrutia APRN - CNP   ARIPiprazole ER (ABILIFY MAINTENA) 400 MG PRSY Inject 400 mg into the muscle every 30 days 10/2/22   Shelley Urrutia APRN - CNP   lithium 300 MG capsule Take 1 capsule by mouth 3 times daily (with meals) 10/1/22 3/9/23  Shelley Urrutia APRN - CNP   ibuprofen (ADVIL;MOTRIN) 800 MG tablet Take 1 tablet by mouth every 8 hours as needed for Pain 10/1/22   Baltazar Izquierdo,    pantoprazole (PROTONIX) 40 MG tablet Take 1 tablet by mouth daily    Provider, MD Guevara        ROS:  As mentioned in subjective, all other systems negative      BP (!) 110/59   Pulse 78   Temp 98.6 °F (37 °C) (Oral)   Resp 16   Ht 1.651 m (5' 5\")   Wt 136.1 kg (300 lb)   SpO2 96%   BMI 49.92 kg/m²     Physical Exam  Const/Neuro- unchanged, no signs of acute distress, Alert  ENMT- Within Normal Limits, Normocephalic, mucous membranes pink/moist, No thrush. Right sided facial cellulitis. Eye is better today. Swelling improving  Neck: Neck supple  Heart- Regular, Rate, Rhythm- no murmur appreciated.  Lungs- clear to ascultation. Respirations even and nonlabored.  Abdomen- Soft, bowel sounds positive, non tender  Musculo/Extremities-  Equal and symmetrical, no edema. No tenderness.  Neurological- No focal motor or sensory loss.  No confusion  Skin:  Warm and dry, free from rashes.    Lines: PIV    Labs, Cultures reviewed  Radiology and other consultants notes reviewed    Microbiology:  RVP negative  Blood cx one set: negative  UA clean  Grp A strep screen: negative    Assessment:  Right sided facial cellulitis:   Urine retention status post  placement.  UA looks clean    Plan:    Continue IV Unasyn 3gr q 6  Monitor labs  Will follow with you      Electronically signed by LEONCIO LUCIANO MD on 5/8/2024 at 1:58 PM

## 2024-05-09 VITALS
HEIGHT: 65 IN | TEMPERATURE: 98.6 F | DIASTOLIC BLOOD PRESSURE: 79 MMHG | SYSTOLIC BLOOD PRESSURE: 121 MMHG | RESPIRATION RATE: 16 BRPM | BODY MASS INDEX: 48.82 KG/M2 | WEIGHT: 293 LBS | OXYGEN SATURATION: 95 % | HEART RATE: 56 BPM

## 2024-05-09 LAB
ALBUMIN SERPL-MCNC: 3.7 G/DL (ref 3.5–5.2)
ALP SERPL-CCNC: 64 U/L (ref 35–104)
ALT SERPL-CCNC: 11 U/L (ref 0–32)
ANION GAP SERPL CALCULATED.3IONS-SCNC: 11 MMOL/L (ref 7–16)
AST SERPL-CCNC: 15 U/L (ref 0–31)
BASOPHILS # BLD: 0.04 K/UL (ref 0–0.2)
BASOPHILS NFR BLD: 0 % (ref 0–2)
BILIRUB SERPL-MCNC: 0.2 MG/DL (ref 0–1.2)
BUN SERPL-MCNC: 9 MG/DL (ref 6–20)
CALCIUM SERPL-MCNC: 8.7 MG/DL (ref 8.6–10.2)
CHLORIDE SERPL-SCNC: 108 MMOL/L (ref 98–107)
CO2 SERPL-SCNC: 18 MMOL/L (ref 22–29)
CREAT SERPL-MCNC: 0.9 MG/DL (ref 0.5–1)
EOSINOPHIL # BLD: 0.62 K/UL (ref 0.05–0.5)
EOSINOPHILS RELATIVE PERCENT: 7 % (ref 0–6)
ERYTHROCYTE [DISTWIDTH] IN BLOOD BY AUTOMATED COUNT: 15.6 % (ref 11.5–15)
GFR, ESTIMATED: >90 ML/MIN/1.73M2
GLUCOSE SERPL-MCNC: 261 MG/DL (ref 74–99)
HBA1C MFR BLD: 7.3 % (ref 4–5.6)
HCT VFR BLD AUTO: 38.8 % (ref 34–48)
HGB BLD-MCNC: 11.2 G/DL (ref 11.5–15.5)
IMM GRANULOCYTES # BLD AUTO: 0.23 K/UL (ref 0–0.58)
IMM GRANULOCYTES NFR BLD: 2 % (ref 0–5)
LYMPHOCYTES NFR BLD: 3.37 K/UL (ref 1.5–4)
LYMPHOCYTES RELATIVE PERCENT: 36 % (ref 20–42)
MCH RBC QN AUTO: 26.9 PG (ref 26–35)
MCHC RBC AUTO-ENTMCNC: 28.9 G/DL (ref 32–34.5)
MCV RBC AUTO: 93 FL (ref 80–99.9)
MICROORGANISM SPEC CULT: ABNORMAL
MONOCYTES NFR BLD: 1.12 K/UL (ref 0.1–0.95)
MONOCYTES NFR BLD: 12 % (ref 2–12)
NEUTROPHILS NFR BLD: 43 % (ref 43–80)
NEUTS SEG NFR BLD: 4.03 K/UL (ref 1.8–7.3)
PLATELET # BLD AUTO: 211 K/UL (ref 130–450)
PMV BLD AUTO: 10.5 FL (ref 7–12)
POTASSIUM SERPL-SCNC: 4.6 MMOL/L (ref 3.5–5)
PROT SERPL-MCNC: 6.5 G/DL (ref 6.4–8.3)
RBC # BLD AUTO: 4.17 M/UL (ref 3.5–5.5)
RBC # BLD: ABNORMAL 10*6/UL
SODIUM SERPL-SCNC: 137 MMOL/L (ref 132–146)
SPECIMEN DESCRIPTION: ABNORMAL
WBC OTHER # BLD: 9.4 K/UL (ref 4.5–11.5)

## 2024-05-09 PROCEDURE — 99239 HOSP IP/OBS DSCHRG MGMT >30: CPT | Performed by: STUDENT IN AN ORGANIZED HEALTH CARE EDUCATION/TRAINING PROGRAM

## 2024-05-09 PROCEDURE — 2580000003 HC RX 258: Performed by: STUDENT IN AN ORGANIZED HEALTH CARE EDUCATION/TRAINING PROGRAM

## 2024-05-09 PROCEDURE — 36415 COLL VENOUS BLD VENIPUNCTURE: CPT

## 2024-05-09 PROCEDURE — 6360000002 HC RX W HCPCS: Performed by: STUDENT IN AN ORGANIZED HEALTH CARE EDUCATION/TRAINING PROGRAM

## 2024-05-09 PROCEDURE — 83036 HEMOGLOBIN GLYCOSYLATED A1C: CPT

## 2024-05-09 PROCEDURE — 80053 COMPREHEN METABOLIC PANEL: CPT

## 2024-05-09 PROCEDURE — 6370000000 HC RX 637 (ALT 250 FOR IP): Performed by: INTERNAL MEDICINE

## 2024-05-09 PROCEDURE — 85025 COMPLETE CBC W/AUTO DIFF WBC: CPT

## 2024-05-09 RX ORDER — HYDROCODONE BITARTRATE AND ACETAMINOPHEN 5; 325 MG/1; MG/1
1 TABLET ORAL EVERY 6 HOURS PRN
Qty: 12 TABLET | Refills: 0 | Status: SHIPPED | OUTPATIENT
Start: 2024-05-09 | End: 2024-05-12

## 2024-05-09 RX ORDER — AMOXICILLIN AND CLAVULANATE POTASSIUM 562.5; 437.5; 62.5 MG/1; MG/1; MG/1
1 TABLET, MULTILAYER, EXTENDED RELEASE ORAL 2 TIMES DAILY
Qty: 10 TABLET | Refills: 0 | Status: SHIPPED | OUTPATIENT
Start: 2024-05-09 | End: 2024-05-09

## 2024-05-09 RX ORDER — AMOXICILLIN AND CLAVULANATE POTASSIUM 562.5; 437.5; 62.5 MG/1; MG/1; MG/1
1 TABLET, MULTILAYER, EXTENDED RELEASE ORAL 2 TIMES DAILY
Qty: 10 TABLET | Refills: 0 | Status: SHIPPED | OUTPATIENT
Start: 2024-05-09 | End: 2024-05-14

## 2024-05-09 RX ADMIN — NAPROXEN 250 MG: 250 TABLET ORAL at 16:35

## 2024-05-09 RX ADMIN — FLUOXETINE HYDROCHLORIDE 20 MG: 20 CAPSULE ORAL at 08:47

## 2024-05-09 RX ADMIN — DIVALPROEX SODIUM 500 MG: 250 TABLET, DELAYED RELEASE ORAL at 12:19

## 2024-05-09 RX ADMIN — HYDROCODONE BITARTRATE AND ACETAMINOPHEN 1 TABLET: 5; 325 TABLET ORAL at 12:19

## 2024-05-09 RX ADMIN — AMLODIPINE BESYLATE 5 MG: 5 TABLET ORAL at 08:49

## 2024-05-09 RX ADMIN — PANTOPRAZOLE SODIUM 40 MG: 40 TABLET, DELAYED RELEASE ORAL at 08:49

## 2024-05-09 RX ADMIN — OLANZAPINE 10 MG: 10 TABLET, ORALLY DISINTEGRATING ORAL at 08:48

## 2024-05-09 RX ADMIN — LITHIUM CARBONATE 300 MG: 300 CAPSULE, GELATIN COATED ORAL at 08:48

## 2024-05-09 RX ADMIN — AMPICILLIN SODIUM AND SULBACTAM SODIUM 3000 MG: 2; 1 INJECTION, POWDER, FOR SOLUTION INTRAMUSCULAR; INTRAVENOUS at 03:31

## 2024-05-09 RX ADMIN — METOPROLOL SUCCINATE 25 MG: 25 TABLET, EXTENDED RELEASE ORAL at 08:49

## 2024-05-09 RX ADMIN — LITHIUM CARBONATE 300 MG: 300 CAPSULE, GELATIN COATED ORAL at 12:19

## 2024-05-09 RX ADMIN — LEVETIRACETAM 1000 MG: 500 TABLET, FILM COATED ORAL at 08:48

## 2024-05-09 RX ADMIN — LITHIUM CARBONATE 300 MG: 300 CAPSULE, GELATIN COATED ORAL at 16:34

## 2024-05-09 RX ADMIN — NAPROXEN 250 MG: 250 TABLET ORAL at 08:48

## 2024-05-09 RX ADMIN — MICONAZOLE NITRATE: 20 POWDER TOPICAL at 08:49

## 2024-05-09 RX ADMIN — DIVALPROEX SODIUM 500 MG: 250 TABLET, DELAYED RELEASE ORAL at 08:48

## 2024-05-09 RX ADMIN — LACOSAMIDE 150 MG: 100 TABLET, FILM COATED ORAL at 08:47

## 2024-05-09 RX ADMIN — AMPICILLIN SODIUM AND SULBACTAM SODIUM 3000 MG: 2; 1 INJECTION, POWDER, FOR SOLUTION INTRAMUSCULAR; INTRAVENOUS at 15:26

## 2024-05-09 RX ADMIN — DIVALPROEX SODIUM 500 MG: 250 TABLET, DELAYED RELEASE ORAL at 16:35

## 2024-05-09 RX ADMIN — AMPICILLIN SODIUM AND SULBACTAM SODIUM 3000 MG: 2; 1 INJECTION, POWDER, FOR SOLUTION INTRAMUSCULAR; INTRAVENOUS at 08:47

## 2024-05-09 ASSESSMENT — PAIN DESCRIPTION - LOCATION: LOCATION: FACE;FOOT

## 2024-05-09 ASSESSMENT — PAIN SCALES - GENERAL
PAINLEVEL_OUTOF10: 10
PAINLEVEL_OUTOF10: 4

## 2024-05-09 ASSESSMENT — PAIN - FUNCTIONAL ASSESSMENT: PAIN_FUNCTIONAL_ASSESSMENT: PREVENTS OR INTERFERES SOME ACTIVE ACTIVITIES AND ADLS

## 2024-05-09 ASSESSMENT — PAIN DESCRIPTION - DESCRIPTORS: DESCRIPTORS: THROBBING

## 2024-05-09 ASSESSMENT — PAIN DESCRIPTION - ORIENTATION: ORIENTATION: RIGHT;LEFT

## 2024-05-09 NOTE — PROGRESS NOTES
CLINICAL PHARMACY NOTE: MEDS TO BEDS    Total # of Prescriptions Filled: 1   The following medications were delivered to the patient:  Amoxicillin pot clav 1000-82.5 mg    Additional Documentation:RN signed for it

## 2024-05-09 NOTE — DISCHARGE INSTR - COC
Continuity of Care Form    Patient Name: Guero Bragg   :  2000  MRN:  [id number]    Admit date:  2024  Discharge date:  24    Code Status Order: Full Code   Advance Directives:     Admitting Physician:  Neisha Guthrie MD  PCP: Cristo Morelos DO    Discharging Nurse: Omid Inman RN  Discharging Hospital Unit/Room#: 0520/0520-A  Discharging Unit Phone Number: [phone]    Emergency Contact:   Extended Emergency Contact Information  Primary Emergency Contact: Katerin Arreguin  Address: [address] [place] [address] [place] [address] [facility]  Home Phone: [phone]  Mobile Phone: [phone]  Relation: Parent   needed? No  Secondary Emergency Contact: Renzo Blackmon   [facility]  Home Phone: [phone]  Mobile Phone: [phone]  Relation: Grandparent   needed? No    Past Surgical History:  Past Surgical History:   Procedure Laterality Date    BREAST SURGERY Right     lumpectomy    LAPAROSCOPY Left 2019    LAPAROSCOPY, REMOVAL OF LEFT OVARIAN MASS, PERITONEAL WASHINGS FOR CELL BLOCK performed by Yeimy Sigala MD at [facility] OR       Immunization History:     There is no immunization history on file for this patient.    Active Problems:  Patient Active Problem List   Diagnosis Code    Pelvic mass in female R19.00    Post-op pain G89.18    Post-operative pain G89.18    Altered mental status R41.82    Seizure disorder ([facility]) G40.909    Severe manic bipolar 1 disorder with psychotic behavior ([facility]) F31.2    Intellectual disability F79    Borderline personality disorder (HCC) F60.3    Periorbital cellulitis L03.213       Isolation/Infection:   Isolation            No Isolation          Patient Infection Status       None to display                     Nurse Assessment:  Last Vital Signs: /79   Pulse 56   Temp 98.6 °F (37 °C) (Oral)   Resp 16   Ht 1.651 m (5' 5\")   Wt (!) 159 kg (350 lb 8.5 oz)   SpO2 95%   BMI 58.33 kg/m²

## 2024-05-09 NOTE — PROGRESS NOTES
Infectious Disease  Progress Note  [facility]    Chief Complaint: right face cellulitis    Subjective:  she says her eye is better today but has some pain right side of the face. Tolerating antibiotics well.     Scheduled Meds:   amLODIPine  5 mg Oral Daily    divalproex  500 mg Oral TID    ARIPiprazole ER  400 mg IntraMUSCular See Admin Instructions    FLUoxetine  20 mg Oral Daily    lacosamide  150 mg Oral BID    levETIRAcetam  1,000 mg Oral BID    lithium  300 mg Oral TID WC    melatonin  3 mg Oral Nightly    metoprolol succinate  25 mg Oral Daily    miconazole   Topical BID    nicotine  1 patch TransDERmal Daily    OLANZapine zydis  10 mg Oral BID    pantoprazole  40 mg Oral Daily    prazosin  5 mg Oral Nightly    naproxen  250 mg Oral BID WC    ampicillin-sulbactam  3,000 mg IntraVENous Q6H     Continuous Infusions:  PRN Meds:HYDROcodone 5 mg - acetaminophen, diphenhydrAMINE    Prior to Admission medications    Medication Sig Start Date End Date Taking? Authorizing Provider   amoxicillin-clavulanate (AUGMENTIN XR) 1000-62.5 MG per extended release tablet Take 1 tablet by mouth 2 times daily for 5 days 5/9/24 5/14/24 Yes Janet Maloney MD   amLODIPine (NORVASC) 5 MG tablet Take 1 tablet by mouth daily    Guevara Walker MD   FLUoxetine (PROZAC) 20 MG capsule Take 1 capsule by mouth daily    Guevara Walker MD   metoprolol succinate (TOPROL XL) 25 MG extended release tablet Take 1 tablet by mouth daily    Guevara Walker MD   prazosin (MINIPRESS) 5 MG capsule Take 1 capsule by mouth nightly    Guevara Walker MD   naproxen (NAPROSYN) 375 MG tablet Take 1 tablet by mouth 2 times daily (with meals) 5/2/24   Ford Eastman MD   lacosamide (VIMPAT) 150 MG TABS tablet 1 TAB BY MOUTH TWICE A DAY AT 7AM & 9PM (SEIZURE D/O) 1/30/24 2/29/24  Tamika Dutton APRN - CNP   divalproex (DEPAKOTE) 500 MG DR tablet Take 1 tablet by mouth 3 times daily 3/9/23 4/8/23  Tamika Dutton APRN - CNP   nicotine

## 2024-05-09 NOTE — PROGRESS NOTES
[facility] Quality Flow/Interdisciplinary Rounds Progress Note        Quality Flow Rounds held on May 9, 2024    Disciplines Attending:  Bedside Nurse, , , and Nursing Unit Leadership    Guero Bragg was admitted on 5/7/2024 12:04 AM    Anticipated Discharge Date:       Disposition:    Shimon Score:  Shimon Scale Score: 19    Readmission Risk              Risk of Unplanned Readmission:  9           Discussed patient goal for the day, patient clinical progression, and barriers to discharge.  The following Goal(s) of the Day/Commitment(s) have been identified:  Diagnostics - Report Results and Labs - Report Results      Nisa Mendoza RN  May 9, 2024

## 2024-05-09 NOTE — PROGRESS NOTES
Spoke with Aniya Tapia, coordinator at [place]. Updated her about patients condition, notified that she is discharged and ready to be picked up. Opportunities for questions were provided. Electronically signed by Omid Inman RN on 5/9/2024 at 5:15 PM

## 2024-05-09 NOTE — CARE COORDINATION
Social Work discharge planning  Sharee at group home [phone] aware of discharge today.  She asked that any new Rx be sent to [facility] [phone]. Notied charge Edyta Paula and pt's Edyta Jackson. Brought Meds to [facility] for antibiotic back to outpt pharmacy, as Sharee said they can not accept meds from other pharmacy. Rn aware Rx needs sent o [facility]. Sharee asked that pt be given first dose of atb tonight, as they won't be able to get new med until tomorrow.   Sharee advised they have no other family contact info. She will pick pt up to go back to group home today around 5pm.  Electronically signed by CHELE Madrid on 5/9/2024 at 2:58 PM

## 2024-05-09 NOTE — PROGRESS NOTES
Patient's marshall was removes at 2:15 pm. Electronically signed by Omid Inman RN on 5/9/2024 at 2:51 PM

## 2024-05-09 NOTE — PROGRESS NOTES
Patient voided large amount on bed side commode per PCA. Electronically signed by Omid Inman RN on 5/9/2024 at 3:17 PM

## 2024-05-10 LAB — CALCIT SERPL-MCNC: <2 PG/ML (ref 0–5.1)

## 2024-05-11 LAB
MICROORGANISM SPEC CULT: NORMAL
SERVICE CMNT-IMP: NORMAL
SPECIMEN DESCRIPTION: NORMAL

## 2024-05-11 NOTE — PROGRESS NOTES
Physician Progress Note      PATIENT:               SKYLAR REYES  CSN #:                  [id number]  :                       2000  ADMIT DATE:       2024 12:04 AM  DISCH DATE:        2024 5:52 PM  RESPONDING  PROVIDER #:        Isabel Yoo MD          QUERY TEXT:    Patient admitted with BMI 58. If possible, please document in progress notes   and discharge summary if you are evaluating and/or treating any of the   following:    The medical record reflects the following:  Risk Factors: borderline personality  Clinical Indicators: BMI 58, 5'5\", 350lb  Treatment: supportive care    Specificity of obesity and morbid obesity should be reported based on   physician documentation, as there are several published classifications and   definitions?  MS-DRG Training Guide. CDC:   https://www.cdc.gov/obesity/basics/adult-defining.html. WHO:   https://www.who.int/news-room/fact-sheets/detail/obesity-and-overweight. NIH:   https://www.nhlbi.nih.gov/health/educational/lose_wt/BMI/bmi_dis.htm    Thank you,  Penny Degroot, RN, BSN, CDIS  Clinical Documentation Integrity  [email or web address]  Options provided:  -- Morbid obesity  -- BMI not clinically significant  -- Other - I will add my own diagnosis  -- Disagree - Not applicable / Not valid  -- Disagree - Clinically unable to determine / Unknown  -- Refer to Clinical Documentation Reviewer    PROVIDER RESPONSE TEXT:    This patient has morbid obesity.    Query created by: Penny Degroot on 2024 1:24 PM      Electronically signed by:  Isabel Yoo MD 2024 8:23 AM

## 2024-05-18 ENCOUNTER — APPOINTMENT (OUTPATIENT)
Dept: GENERAL RADIOLOGY | Age: 24
DRG: 176 | End: 2024-05-18
Payer: COMMERCIAL

## 2024-05-18 ENCOUNTER — APPOINTMENT (OUTPATIENT)
Dept: CT IMAGING | Age: 24
DRG: 176 | End: 2024-05-18
Payer: COMMERCIAL

## 2024-05-18 ENCOUNTER — HOSPITAL ENCOUNTER (INPATIENT)
Age: 24
LOS: 2 days | Discharge: HOME | DRG: 176 | End: 2024-05-20
Attending: EMERGENCY MEDICINE | Admitting: INTERNAL MEDICINE
Payer: COMMERCIAL

## 2024-05-18 DIAGNOSIS — I26.99 ACUTE PULMONARY EMBOLISM WITHOUT ACUTE COR PULMONALE, UNSPECIFIED PULMONARY EMBOLISM TYPE (HCC): Primary | ICD-10-CM

## 2024-05-18 DIAGNOSIS — R06.02 SHORTNESS OF BREATH: ICD-10-CM

## 2024-05-18 PROBLEM — E66.9 OBESITY WITH SERIOUS COMORBIDITY: Status: ACTIVE | Noted: 2024-05-18

## 2024-05-18 PROBLEM — I10 PRIMARY HYPERTENSION: Status: ACTIVE | Noted: 2024-05-18

## 2024-05-18 LAB
ALBUMIN SERPL-MCNC: 4.5 G/DL (ref 3.5–5.2)
ALP SERPL-CCNC: 66 U/L (ref 35–104)
ALT SERPL-CCNC: 18 U/L (ref 0–32)
ANION GAP SERPL CALCULATED.3IONS-SCNC: 11 MMOL/L (ref 7–16)
AST SERPL-CCNC: 23 U/L (ref 0–31)
BASOPHILS # BLD: 0 K/UL (ref 0–0.2)
BASOPHILS NFR BLD: 0 % (ref 0–2)
BILIRUB DIRECT SERPL-MCNC: <0.2 MG/DL (ref 0–0.3)
BILIRUB INDIRECT SERPL-MCNC: NORMAL MG/DL (ref 0–1)
BILIRUB SERPL-MCNC: 0.2 MG/DL (ref 0–1.2)
BILIRUB UR QL STRIP: NEGATIVE
BUN SERPL-MCNC: 5 MG/DL (ref 6–20)
CALCIUM SERPL-MCNC: 9.6 MG/DL (ref 8.6–10.2)
CHLORIDE SERPL-SCNC: 106 MMOL/L (ref 98–107)
CLARITY UR: CLEAR
CO2 SERPL-SCNC: 23 MMOL/L (ref 22–29)
COLOR UR: YELLOW
CREAT SERPL-MCNC: 1.2 MG/DL (ref 0.5–1)
D-DIMER QUANTITATIVE: 2225 NG/ML DDU (ref 0–230)
EOSINOPHIL # BLD: 0.43 K/UL (ref 0.05–0.5)
EOSINOPHILS RELATIVE PERCENT: 5 % (ref 0–6)
ERYTHROCYTE [DISTWIDTH] IN BLOOD BY AUTOMATED COUNT: 15.5 % (ref 11.5–15)
ERYTHROCYTE [DISTWIDTH] IN BLOOD BY AUTOMATED COUNT: 15.5 % (ref 11.5–15)
GFR, ESTIMATED: 69 ML/MIN/1.73M2
GLUCOSE SERPL-MCNC: 153 MG/DL (ref 74–99)
GLUCOSE UR STRIP-MCNC: NEGATIVE MG/DL
HCG, URINE, POC: NEGATIVE
HCT VFR BLD AUTO: 41.3 % (ref 34–48)
HCT VFR BLD AUTO: 43.2 % (ref 34–48)
HGB BLD-MCNC: 12.4 G/DL (ref 11.5–15.5)
HGB BLD-MCNC: 12.7 G/DL (ref 11.5–15.5)
HGB UR QL STRIP.AUTO: NEGATIVE
KETONES UR STRIP-MCNC: NEGATIVE MG/DL
LEUKOCYTE ESTERASE UR QL STRIP: ABNORMAL
LYMPHOCYTES NFR BLD: 3.01 K/UL (ref 1.5–4)
LYMPHOCYTES RELATIVE PERCENT: 35 % (ref 20–42)
Lab: NORMAL
MCH RBC QN AUTO: 27.1 PG (ref 26–35)
MCH RBC QN AUTO: 27.4 PG (ref 26–35)
MCHC RBC AUTO-ENTMCNC: 29.4 G/DL (ref 32–34.5)
MCHC RBC AUTO-ENTMCNC: 30 G/DL (ref 32–34.5)
MCV RBC AUTO: 91.2 FL (ref 80–99.9)
MCV RBC AUTO: 92.1 FL (ref 80–99.9)
MONOCYTES NFR BLD: 1.29 K/UL (ref 0.1–0.95)
MONOCYTES NFR BLD: 15 % (ref 2–12)
NEGATIVE QC PASS/FAIL: NORMAL
NEUTROPHILS NFR BLD: 45 % (ref 43–80)
NEUTS SEG NFR BLD: 3.87 K/UL (ref 1.8–7.3)
NITRITE UR QL STRIP: NEGATIVE
PARTIAL THROMBOPLASTIN TIME: 28.1 SEC (ref 24.5–35.1)
PH UR STRIP: 6.5 [PH] (ref 5–9)
PLATELET # BLD AUTO: 233 K/UL (ref 130–450)
PLATELET # BLD AUTO: 248 K/UL (ref 130–450)
PMV BLD AUTO: 10.5 FL (ref 7–12)
PMV BLD AUTO: 10.6 FL (ref 7–12)
POSITIVE QC PASS/FAIL: NORMAL
POTASSIUM SERPL-SCNC: 4.6 MMOL/L (ref 3.5–5)
PROT SERPL-MCNC: 7.9 G/DL (ref 6.4–8.3)
PROT UR STRIP-MCNC: NEGATIVE MG/DL
RBC # BLD AUTO: 4.53 M/UL (ref 3.5–5.5)
RBC # BLD AUTO: 4.69 M/UL (ref 3.5–5.5)
RBC # BLD: ABNORMAL 10*6/UL
RBC # BLD: ABNORMAL 10*6/UL
RBC #/AREA URNS HPF: ABNORMAL /HPF
SODIUM SERPL-SCNC: 140 MMOL/L (ref 132–146)
SP GR UR STRIP: <1.005 (ref 1–1.03)
TROPONIN I SERPL HS-MCNC: 28 NG/L (ref 0–9)
TROPONIN I SERPL HS-MCNC: 32 NG/L (ref 0–9)
UROBILINOGEN UR STRIP-ACNC: 0.2 EU/DL (ref 0–1)
WBC #/AREA URNS HPF: ABNORMAL /HPF
WBC OTHER # BLD: 8.2 K/UL (ref 4.5–11.5)
WBC OTHER # BLD: 8.6 K/UL (ref 4.5–11.5)

## 2024-05-18 PROCEDURE — 85025 COMPLETE CBC W/AUTO DIFF WBC: CPT

## 2024-05-18 PROCEDURE — 6360000004 HC RX CONTRAST MEDICATION: Performed by: RADIOLOGY

## 2024-05-18 PROCEDURE — 96375 TX/PRO/DX INJ NEW DRUG ADDON: CPT

## 2024-05-18 PROCEDURE — 1200000000 HC SEMI PRIVATE

## 2024-05-18 PROCEDURE — 71046 X-RAY EXAM CHEST 2 VIEWS: CPT

## 2024-05-18 PROCEDURE — 6370000000 HC RX 637 (ALT 250 FOR IP): Performed by: HOSPITALIST

## 2024-05-18 PROCEDURE — 81001 URINALYSIS AUTO W/SCOPE: CPT

## 2024-05-18 PROCEDURE — 99285 EMERGENCY DEPT VISIT HI MDM: CPT

## 2024-05-18 PROCEDURE — 82248 BILIRUBIN DIRECT: CPT

## 2024-05-18 PROCEDURE — 2580000003 HC RX 258: Performed by: HOSPITALIST

## 2024-05-18 PROCEDURE — 99223 1ST HOSP IP/OBS HIGH 75: CPT | Performed by: HOSPITALIST

## 2024-05-18 PROCEDURE — 93005 ELECTROCARDIOGRAM TRACING: CPT

## 2024-05-18 PROCEDURE — 71275 CT ANGIOGRAPHY CHEST: CPT

## 2024-05-18 PROCEDURE — 87086 URINE CULTURE/COLONY COUNT: CPT

## 2024-05-18 PROCEDURE — 96374 THER/PROPH/DIAG INJ IV PUSH: CPT

## 2024-05-18 PROCEDURE — 85730 THROMBOPLASTIN TIME PARTIAL: CPT

## 2024-05-18 PROCEDURE — 85379 FIBRIN DEGRADATION QUANT: CPT

## 2024-05-18 PROCEDURE — 84484 ASSAY OF TROPONIN QUANT: CPT

## 2024-05-18 PROCEDURE — 85027 COMPLETE CBC AUTOMATED: CPT

## 2024-05-18 PROCEDURE — 80053 COMPREHEN METABOLIC PANEL: CPT

## 2024-05-18 PROCEDURE — 6360000002 HC RX W HCPCS

## 2024-05-18 RX ORDER — DIVALPROEX SODIUM 250 MG/1
500 TABLET, DELAYED RELEASE ORAL 3 TIMES DAILY
Status: DISCONTINUED | OUTPATIENT
Start: 2024-05-19 | End: 2024-05-20 | Stop reason: HOSPADM

## 2024-05-18 RX ORDER — POLYETHYLENE GLYCOL 3350 17 G/17G
17 POWDER, FOR SOLUTION ORAL DAILY PRN
Status: DISCONTINUED | OUTPATIENT
Start: 2024-05-18 | End: 2024-05-20 | Stop reason: HOSPADM

## 2024-05-18 RX ORDER — HEPARIN SODIUM 1000 [USP'U]/ML
10000 INJECTION, SOLUTION INTRAVENOUS; SUBCUTANEOUS PRN
Status: DISCONTINUED | OUTPATIENT
Start: 2024-05-18 | End: 2024-05-20 | Stop reason: ALTCHOICE

## 2024-05-18 RX ORDER — OLANZAPINE 10 MG/1
10 TABLET, ORALLY DISINTEGRATING ORAL 2 TIMES DAILY
Status: DISCONTINUED | OUTPATIENT
Start: 2024-05-18 | End: 2024-05-20 | Stop reason: HOSPADM

## 2024-05-18 RX ORDER — POTASSIUM CHLORIDE 7.45 MG/ML
10 INJECTION INTRAVENOUS PRN
Status: DISCONTINUED | OUTPATIENT
Start: 2024-05-18 | End: 2024-05-20 | Stop reason: HOSPADM

## 2024-05-18 RX ORDER — PRAZOSIN HYDROCHLORIDE 2 MG/1
4 CAPSULE ORAL NIGHTLY
Status: DISCONTINUED | OUTPATIENT
Start: 2024-05-18 | End: 2024-05-20 | Stop reason: HOSPADM

## 2024-05-18 RX ORDER — AMLODIPINE BESYLATE 5 MG/1
5 TABLET ORAL DAILY
Status: DISCONTINUED | OUTPATIENT
Start: 2024-05-19 | End: 2024-05-20 | Stop reason: HOSPADM

## 2024-05-18 RX ORDER — ACETAMINOPHEN 650 MG/1
650 SUPPOSITORY RECTAL EVERY 6 HOURS PRN
Status: DISCONTINUED | OUTPATIENT
Start: 2024-05-18 | End: 2024-05-20 | Stop reason: HOSPADM

## 2024-05-18 RX ORDER — LITHIUM CARBONATE 300 MG/1
300 CAPSULE ORAL
Status: DISCONTINUED | OUTPATIENT
Start: 2024-05-19 | End: 2024-05-20 | Stop reason: HOSPADM

## 2024-05-18 RX ORDER — LEVETIRACETAM 500 MG/1
1000 TABLET ORAL 2 TIMES DAILY
Status: DISCONTINUED | OUTPATIENT
Start: 2024-05-18 | End: 2024-05-20 | Stop reason: HOSPADM

## 2024-05-18 RX ORDER — PANTOPRAZOLE SODIUM 40 MG/1
40 TABLET, DELAYED RELEASE ORAL DAILY
Status: DISCONTINUED | OUTPATIENT
Start: 2024-05-19 | End: 2024-05-20 | Stop reason: HOSPADM

## 2024-05-18 RX ORDER — FLUOXETINE HYDROCHLORIDE 20 MG/1
20 CAPSULE ORAL DAILY
Status: DISCONTINUED | OUTPATIENT
Start: 2024-05-19 | End: 2024-05-20 | Stop reason: HOSPADM

## 2024-05-18 RX ORDER — ACETAMINOPHEN 325 MG/1
650 TABLET ORAL ONCE
Status: DISCONTINUED | OUTPATIENT
Start: 2024-05-18 | End: 2024-05-20 | Stop reason: HOSPADM

## 2024-05-18 RX ORDER — DIPHENHYDRAMINE HYDROCHLORIDE 50 MG/ML
50 INJECTION INTRAMUSCULAR; INTRAVENOUS ONCE
Status: COMPLETED | OUTPATIENT
Start: 2024-05-18 | End: 2024-05-18

## 2024-05-18 RX ORDER — METHYLPREDNISOLONE SODIUM SUCCINATE 40 MG/ML
40 INJECTION, POWDER, LYOPHILIZED, FOR SOLUTION INTRAMUSCULAR; INTRAVENOUS ONCE
Status: COMPLETED | OUTPATIENT
Start: 2024-05-18 | End: 2024-05-18

## 2024-05-18 RX ORDER — IBUPROFEN 800 MG/1
800 TABLET ORAL EVERY 8 HOURS PRN
Status: DISCONTINUED | OUTPATIENT
Start: 2024-05-18 | End: 2024-05-20 | Stop reason: HOSPADM

## 2024-05-18 RX ORDER — SODIUM CHLORIDE, SODIUM LACTATE, POTASSIUM CHLORIDE, CALCIUM CHLORIDE 600; 310; 30; 20 MG/100ML; MG/100ML; MG/100ML; MG/100ML
INJECTION, SOLUTION INTRAVENOUS CONTINUOUS
Status: ACTIVE | OUTPATIENT
Start: 2024-05-18 | End: 2024-05-19

## 2024-05-18 RX ORDER — HEPARIN SODIUM 10000 [USP'U]/100ML
5-30 INJECTION, SOLUTION INTRAVENOUS CONTINUOUS
Status: DISCONTINUED | OUTPATIENT
Start: 2024-05-18 | End: 2024-05-20

## 2024-05-18 RX ORDER — SODIUM CHLORIDE 9 MG/ML
INJECTION, SOLUTION INTRAVENOUS PRN
Status: DISCONTINUED | OUTPATIENT
Start: 2024-05-18 | End: 2024-05-20 | Stop reason: HOSPADM

## 2024-05-18 RX ORDER — FENTANYL CITRATE 50 UG/ML
25 INJECTION, SOLUTION INTRAMUSCULAR; INTRAVENOUS ONCE
Status: COMPLETED | OUTPATIENT
Start: 2024-05-18 | End: 2024-05-18

## 2024-05-18 RX ORDER — SODIUM CHLORIDE 0.9 % (FLUSH) 0.9 %
5-40 SYRINGE (ML) INJECTION EVERY 12 HOURS SCHEDULED
Status: DISCONTINUED | OUTPATIENT
Start: 2024-05-18 | End: 2024-05-20 | Stop reason: HOSPADM

## 2024-05-18 RX ORDER — ONDANSETRON 4 MG/1
4 TABLET, ORALLY DISINTEGRATING ORAL EVERY 8 HOURS PRN
Status: DISCONTINUED | OUTPATIENT
Start: 2024-05-18 | End: 2024-05-20 | Stop reason: HOSPADM

## 2024-05-18 RX ORDER — MAGNESIUM SULFATE IN WATER 40 MG/ML
2000 INJECTION, SOLUTION INTRAVENOUS PRN
Status: DISCONTINUED | OUTPATIENT
Start: 2024-05-18 | End: 2024-05-20 | Stop reason: HOSPADM

## 2024-05-18 RX ORDER — LACOSAMIDE 50 MG/1
150 TABLET ORAL 2 TIMES DAILY
Status: DISCONTINUED | OUTPATIENT
Start: 2024-05-18 | End: 2024-05-20 | Stop reason: HOSPADM

## 2024-05-18 RX ORDER — PRAZOSIN HYDROCHLORIDE 1 MG/1
1 CAPSULE ORAL NIGHTLY
Status: DISCONTINUED | OUTPATIENT
Start: 2024-05-18 | End: 2024-05-20 | Stop reason: HOSPADM

## 2024-05-18 RX ORDER — HEPARIN SODIUM 1000 [USP'U]/ML
5000 INJECTION, SOLUTION INTRAVENOUS; SUBCUTANEOUS PRN
Status: DISCONTINUED | OUTPATIENT
Start: 2024-05-18 | End: 2024-05-20 | Stop reason: ALTCHOICE

## 2024-05-18 RX ORDER — PRAZOSIN HYDROCHLORIDE 5 MG/1
5 CAPSULE ORAL NIGHTLY
Status: DISCONTINUED | OUTPATIENT
Start: 2024-05-18 | End: 2024-05-18 | Stop reason: SDUPTHER

## 2024-05-18 RX ORDER — ONDANSETRON 2 MG/ML
4 INJECTION INTRAMUSCULAR; INTRAVENOUS EVERY 6 HOURS PRN
Status: DISCONTINUED | OUTPATIENT
Start: 2024-05-18 | End: 2024-05-20 | Stop reason: HOSPADM

## 2024-05-18 RX ORDER — NICOTINE 21 MG/24HR
1 PATCH, TRANSDERMAL 24 HOURS TRANSDERMAL DAILY
Status: DISCONTINUED | OUTPATIENT
Start: 2024-05-19 | End: 2024-05-20 | Stop reason: HOSPADM

## 2024-05-18 RX ORDER — ACETAMINOPHEN 325 MG/1
650 TABLET ORAL EVERY 6 HOURS PRN
Status: DISCONTINUED | OUTPATIENT
Start: 2024-05-18 | End: 2024-05-20 | Stop reason: HOSPADM

## 2024-05-18 RX ORDER — LANOLIN ALCOHOL/MO/W.PET/CERES
3 CREAM (GRAM) TOPICAL NIGHTLY
Status: DISCONTINUED | OUTPATIENT
Start: 2024-05-18 | End: 2024-05-20 | Stop reason: HOSPADM

## 2024-05-18 RX ORDER — POTASSIUM CHLORIDE 20 MEQ/1
40 TABLET, EXTENDED RELEASE ORAL PRN
Status: DISCONTINUED | OUTPATIENT
Start: 2024-05-18 | End: 2024-05-20 | Stop reason: HOSPADM

## 2024-05-18 RX ORDER — HEPARIN SODIUM 1000 [USP'U]/ML
10000 INJECTION, SOLUTION INTRAVENOUS; SUBCUTANEOUS ONCE
Status: COMPLETED | OUTPATIENT
Start: 2024-05-18 | End: 2024-05-18

## 2024-05-18 RX ORDER — SODIUM CHLORIDE 0.9 % (FLUSH) 0.9 %
5-40 SYRINGE (ML) INJECTION PRN
Status: DISCONTINUED | OUTPATIENT
Start: 2024-05-18 | End: 2024-05-20 | Stop reason: HOSPADM

## 2024-05-18 RX ORDER — METOPROLOL SUCCINATE 25 MG/1
25 TABLET, EXTENDED RELEASE ORAL DAILY
Status: DISCONTINUED | OUTPATIENT
Start: 2024-05-19 | End: 2024-05-20 | Stop reason: HOSPADM

## 2024-05-18 RX ADMIN — FENTANYL CITRATE 25 MCG: 50 INJECTION INTRAMUSCULAR; INTRAVENOUS at 16:12

## 2024-05-18 RX ADMIN — HEPARIN SODIUM 10000 UNITS: 1000 INJECTION INTRAVENOUS; SUBCUTANEOUS at 19:36

## 2024-05-18 RX ADMIN — SODIUM CHLORIDE, PRESERVATIVE FREE 10 ML: 5 INJECTION INTRAVENOUS at 23:15

## 2024-05-18 RX ADMIN — LACOSAMIDE 150 MG: 50 TABLET, FILM COATED ORAL at 23:15

## 2024-05-18 RX ADMIN — HEPARIN SODIUM 13 UNITS/KG/HR: 10000 INJECTION, SOLUTION INTRAVENOUS at 19:41

## 2024-05-18 RX ADMIN — DIPHENHYDRAMINE HYDROCHLORIDE 50 MG: 50 INJECTION INTRAMUSCULAR; INTRAVENOUS at 16:32

## 2024-05-18 RX ADMIN — SODIUM CHLORIDE, POTASSIUM CHLORIDE, SODIUM LACTATE AND CALCIUM CHLORIDE: 600; 310; 30; 20 INJECTION, SOLUTION INTRAVENOUS at 23:22

## 2024-05-18 RX ADMIN — LEVETIRACETAM 1000 MG: 500 TABLET, FILM COATED ORAL at 23:15

## 2024-05-18 RX ADMIN — IOPAMIDOL 75 ML: 755 INJECTION, SOLUTION INTRAVENOUS at 18:15

## 2024-05-18 RX ADMIN — PRAZOSIN HYDROCHLORIDE 4 MG: 2 CAPSULE ORAL at 23:15

## 2024-05-18 RX ADMIN — FENTANYL CITRATE 25 MCG: 50 INJECTION INTRAMUSCULAR; INTRAVENOUS at 19:34

## 2024-05-18 RX ADMIN — METHYLPREDNISOLONE SODIUM SUCCINATE 40 MG: 40 INJECTION INTRAMUSCULAR; INTRAVENOUS at 16:32

## 2024-05-18 RX ADMIN — PRAZOSIN HYDROCHLORIDE 1 MG: 1 CAPSULE ORAL at 23:15

## 2024-05-18 RX ADMIN — OLANZAPINE 10 MG: 10 TABLET, ORALLY DISINTEGRATING ORAL at 23:15

## 2024-05-18 RX ADMIN — Medication 3 MG: at 23:15

## 2024-05-18 ASSESSMENT — PAIN SCALES - GENERAL
PAINLEVEL_OUTOF10: 0
PAINLEVEL_OUTOF10: 10
PAINLEVEL_OUTOF10: 10

## 2024-05-18 ASSESSMENT — PAIN DESCRIPTION - LOCATION
LOCATION: CHEST
LOCATION: CHEST

## 2024-05-18 ASSESSMENT — PAIN DESCRIPTION - ORIENTATION
ORIENTATION: LEFT
ORIENTATION: LEFT

## 2024-05-18 ASSESSMENT — PAIN - FUNCTIONAL ASSESSMENT: PAIN_FUNCTIONAL_ASSESSMENT: 0-10

## 2024-05-18 ASSESSMENT — PAIN DESCRIPTION - DESCRIPTORS
DESCRIPTORS: SHARP;PRESSURE
DESCRIPTORS: SHARP;SHOOTING

## 2024-05-18 NOTE — ED NOTES
Radiology Procedure Waiver   Name: Guero Bragg  : 2000  MRN: [id number]    Date:  24    Time: 4:52 PM EDT    Benefits of immediately proceeding with radiology exam(s) without pre-testing outweigh the risks or are not indicated as specified below and therefore the following is/are being waived:    [x] Benefits of immediate radiology exam(s) outweigh any risk.                                               OR    Pre-exam testing is not indicated for the following reason(s):  [x] Pregnancy test   [] Patients LMP on-time and regular.   [] Patient had Tubal Ligation or has other Contraception Device.   [] Patient  is Menopausal or Premenarcheal.    [] Patient had Full or Partial Hysterectomy.    [] Protocol for CT contrast allegry   [x] Patient has tolerated well previously   [] Patient does not have a true allergy    [] MRI Questionnaire     [] BUN/Creatinine   [x] Patient age w/no hx of renal dysfunction.   [] Patient on Dialysis.   [] Recent Normal Labs.  Electronically signed by Montana Ferreira MD on 24 at 4:52 PM EDT

## 2024-05-18 NOTE — H&P
evidence of DVT in the right upper extremity.     Vascular duplex lower extremity venous bilateral    Result Date: 5/2/2024  EXAMINATION: DUPLEX VENOUS ULTRASOUND OF THE BILATERAL LOWER EXTREMITIES5/2/2024 11:16 pm TECHNIQUE: Duplex ultrasound using B-mode/gray scaled imaging and Doppler spectral analysis and color flow was obtained of the deep venous structures of the bilateral extremities. COMPARISON: None. HISTORY: ORDERING SYSTEM PROVIDED HISTORY: ble edema FINDINGS: The visualized veins of the bilateral lower extremities are patent and free of echogenic thrombus. The veins demonstrate good compressibility with normal color flow study and spectral analysis.  Note that the calf veins are suboptimally visualized.     No evidence of DVT in either lower extremity.     XR HAND RIGHT (MIN 3 VIEWS)    Result Date: 5/2/2024  EXAMINATION: THREE XRAY VIEWS OF THE RIGHT HAND 5/2/2024 9:19 pm COMPARISON: None. HISTORY: ORDERING SYSTEM PROVIDED HISTORY: right hand swollen TECHNOLOGIST PROVIDED HISTORY: Reason for exam:->right hand swollen FINDINGS: Three views right hand demonstrate mild soft tissue swelling over the dorsum of the metacarpals without evidence of acute fracture or subluxation.  There remainder of the soft tissues and osseous structures appear unremarkable.     Mild soft tissue swelling over the dorsum of the metacarpals without evidence of acute fracture or subluxation.       ASSESSMENT & PLAN::    Principal Problem:    Acute pulmonary embolism without acute cor pulmonale (HCC)  Active Problems:    Seizure disorder (HCC)    Severe manic bipolar 1 disorder with psychotic behavior (HCC)    Intellectual disability    Obesity with serious comorbidity    Primary hypertension    Acute pulmonary embolism without acute cor pulmonale, unspecified pulmonary embolism type (HCC)  Resolved Problems:    * No resolved hospital problems. *      - I have discussed the initial assessment and plan with ED provider, admitted for

## 2024-05-18 NOTE — ED NOTES
ED to Inpatient Handoff Report    Notified Mati that electronic handoff available and patient ready for transport to room 547.    Safety Risks: None identified    Patient in Restraints: no    Constant Observer or Patient : no    Telemetry Monitoring Ordered: Yes          Order to transfer to unit without monitor: NO    Last MEWS: 1 Time completed: 1943         Vitals:    05/18/24 1422 05/18/24 1612 05/18/24 1619 05/18/24 1943   BP: (!) 147/74  (!) 150/78 128/75   Pulse: 89  96 92   Resp: 20 18 22 20   Temp: 99.1 °F (37.3 °C)   98.4 °F (36.9 °C)   TempSrc:    Oral   SpO2: 97%   93%   Weight: (!) 158.8 kg (350 lb)      Height: 1.575 m (5' 2\")          Opportunity for questions and clarification was provided.

## 2024-05-18 NOTE — ED PROVIDER NOTES
Department of Emergency Medicine   ED  Provider Note    Pt Name: Guero Bragg         MRN: [id number]         Birthdate 2000    Admit Date/RoomTime: 5/18/2024  2:13 PM  ED Room: 13/13      Chief Complaint   Patient presents with    Chest Pain     L side chest pain onset this morning        Patient presents emergency department via EMS for chest pain since 11 AM this morning.  Patient does have history of intellectual disability and presents from [place] however is able to provide history for self.  Patient states that this morning around 11 AM she noticed left-sided chest pain and pressure that felt sharp and heavy.  She also states that it hurts a bit when she moves her left arm.  Patient denies any nausea, vomiting, lightheadedness.  Patient nondiaphoretic however does note she has chills.  On examination patient was shaking.  Patient was pleasant.    Of note, patient's mother present emergency department and gave additional history.  Patient's mother has a history of lupus as well as prior blood clots and is concerned her daughter may be developing these issues as well.    The history is provided by the patient and a parent.        Guero Bragg is a 23 y.o. female with PMHx of schizophrenia, seizures, ADHD, autism disorder presenting to the ED for chest pain.  Of note patient recently admitted for periorbital cellulitis for which she had 2 days of IV Unasyn followed by 5 days of Augmentin.    Suspect pulmonary embolism versus atypical chest pain    Review of Systems   Constitutional:  Positive for chills and fatigue. Negative for activity change, appetite change and fever.   Respiratory:  Positive for shortness of breath. Negative for cough, chest tightness and wheezing.    Cardiovascular:  Positive for chest pain and leg swelling (Chronic). Negative for palpitations.   Gastrointestinal:  Negative for abdominal pain, blood in stool, constipation, diarrhea, nausea and vomiting.   Genitourinary:

## 2024-05-19 ENCOUNTER — APPOINTMENT (OUTPATIENT)
Dept: ULTRASOUND IMAGING | Age: 24
DRG: 176 | End: 2024-05-19
Payer: COMMERCIAL

## 2024-05-19 PROBLEM — R07.9 ACUTE CHEST PAIN: Status: ACTIVE | Noted: 2024-05-19

## 2024-05-19 LAB
ALBUMIN SERPL-MCNC: 3.9 G/DL (ref 3.5–5.2)
ALP SERPL-CCNC: 54 U/L (ref 35–104)
ALT SERPL-CCNC: 13 U/L (ref 0–32)
ANION GAP SERPL CALCULATED.3IONS-SCNC: 12 MMOL/L (ref 7–16)
ANION GAP SERPL CALCULATED.3IONS-SCNC: 9 MMOL/L (ref 7–16)
AST SERPL-CCNC: 17 U/L (ref 0–31)
ATYPICAL LYMPHOCYTE ABSOLUTE COUNT: 0.14 K/UL (ref 0–0.46)
ATYPICAL LYMPHOCYTE ABSOLUTE COUNT: 0.39 K/UL (ref 0–0.46)
ATYPICAL LYMPHOCYTES: 1 % (ref 0–4)
ATYPICAL LYMPHOCYTES: 2 % (ref 0–4)
BASOPHILS # BLD: 0 K/UL (ref 0–0.2)
BASOPHILS # BLD: 0 K/UL (ref 0–0.2)
BASOPHILS NFR BLD: 0 % (ref 0–2)
BASOPHILS NFR BLD: 0 % (ref 0–2)
BILIRUB SERPL-MCNC: <0.2 MG/DL (ref 0–1.2)
BUN SERPL-MCNC: 7 MG/DL (ref 6–20)
BUN SERPL-MCNC: 8 MG/DL (ref 6–20)
CALCIUM SERPL-MCNC: 8.9 MG/DL (ref 8.6–10.2)
CALCIUM SERPL-MCNC: 9.2 MG/DL (ref 8.6–10.2)
CHLORIDE SERPL-SCNC: 109 MMOL/L (ref 98–107)
CHLORIDE SERPL-SCNC: 111 MMOL/L (ref 98–107)
CO2 SERPL-SCNC: 20 MMOL/L (ref 22–29)
CO2 SERPL-SCNC: 23 MMOL/L (ref 22–29)
CREAT SERPL-MCNC: 0.9 MG/DL (ref 0.5–1)
CREAT SERPL-MCNC: 1 MG/DL (ref 0.5–1)
EOSINOPHIL # BLD: 0 K/UL (ref 0.05–0.5)
EOSINOPHIL # BLD: 0.39 K/UL (ref 0.05–0.5)
EOSINOPHILS RELATIVE PERCENT: 0 % (ref 0–6)
EOSINOPHILS RELATIVE PERCENT: 3 % (ref 0–6)
ERYTHROCYTE [DISTWIDTH] IN BLOOD BY AUTOMATED COUNT: 15.7 % (ref 11.5–15)
ERYTHROCYTE [DISTWIDTH] IN BLOOD BY AUTOMATED COUNT: 15.7 % (ref 11.5–15)
GFR, ESTIMATED: 81 ML/MIN/1.73M2
GFR, ESTIMATED: 89 ML/MIN/1.73M2
GLUCOSE BLD-MCNC: 189 MG/DL (ref 74–99)
GLUCOSE SERPL-MCNC: 194 MG/DL (ref 74–99)
GLUCOSE SERPL-MCNC: 219 MG/DL (ref 74–99)
HCT VFR BLD AUTO: 38.6 % (ref 34–48)
HCT VFR BLD AUTO: 38.7 % (ref 34–48)
HGB BLD-MCNC: 11.4 G/DL (ref 11.5–15.5)
HGB BLD-MCNC: 11.5 G/DL (ref 11.5–15.5)
LYMPHOCYTES NFR BLD: 2.22 K/UL (ref 1.5–4)
LYMPHOCYTES NFR BLD: 4.88 K/UL (ref 1.5–4)
LYMPHOCYTES RELATIVE PERCENT: 14 % (ref 20–42)
LYMPHOCYTES RELATIVE PERCENT: 31 % (ref 20–42)
MCH RBC QN AUTO: 27 PG (ref 26–35)
MCH RBC QN AUTO: 27.1 PG (ref 26–35)
MCHC RBC AUTO-ENTMCNC: 29.5 G/DL (ref 32–34.5)
MCHC RBC AUTO-ENTMCNC: 29.7 G/DL (ref 32–34.5)
MCV RBC AUTO: 90.8 FL (ref 80–99.9)
MCV RBC AUTO: 91.7 FL (ref 80–99.9)
METAMYELOCYTES ABSOLUTE COUNT: 0.14 K/UL (ref 0–0.12)
METAMYELOCYTES: 1 % (ref 0–1)
MONOCYTES NFR BLD: 0.55 K/UL (ref 0.1–0.95)
MONOCYTES NFR BLD: 0.9 K/UL (ref 0.1–0.95)
MONOCYTES NFR BLD: 4 % (ref 2–12)
MONOCYTES NFR BLD: 6 % (ref 2–12)
MYELOCYTES ABSOLUTE COUNT: 0.14 K/UL
MYELOCYTES: 1 %
NEUTROPHILS NFR BLD: 59 % (ref 43–80)
NEUTROPHILS NFR BLD: 79 % (ref 43–80)
NEUTS SEG NFR BLD: 12.47 K/UL (ref 1.8–7.3)
NEUTS SEG NFR BLD: 9.25 K/UL (ref 1.8–7.3)
PARTIAL THROMBOPLASTIN TIME: 144 SEC (ref 24.5–35.1)
PARTIAL THROMBOPLASTIN TIME: 57.2 SEC (ref 24.5–35.1)
PARTIAL THROMBOPLASTIN TIME: >240 SEC (ref 24.5–35.1)
PLATELET # BLD AUTO: 228 K/UL (ref 130–450)
PLATELET # BLD AUTO: 237 K/UL (ref 130–450)
PMV BLD AUTO: 10.7 FL (ref 7–12)
PMV BLD AUTO: 10.9 FL (ref 7–12)
POTASSIUM SERPL-SCNC: 4.1 MMOL/L (ref 3.5–5)
POTASSIUM SERPL-SCNC: 4.7 MMOL/L (ref 3.5–5)
PROMYELOCYTES ABSOLUTE COUNT: 0.14 K/UL
PROMYELOCYTES: 1 %
PROT SERPL-MCNC: 6.4 G/DL (ref 6.4–8.3)
RBC # BLD AUTO: 4.21 M/UL (ref 3.5–5.5)
RBC # BLD AUTO: 4.26 M/UL (ref 3.5–5.5)
RBC # BLD: ABNORMAL 10*6/UL
SODIUM SERPL-SCNC: 141 MMOL/L (ref 132–146)
SODIUM SERPL-SCNC: 143 MMOL/L (ref 132–146)
TROPONIN I SERPL HS-MCNC: 18 NG/L (ref 0–9)
TROPONIN I SERPL HS-MCNC: 23 NG/L (ref 0–9)
WBC OTHER # BLD: 15.8 K/UL (ref 4.5–11.5)
WBC OTHER # BLD: 15.8 K/UL (ref 4.5–11.5)

## 2024-05-19 PROCEDURE — 80053 COMPREHEN METABOLIC PANEL: CPT

## 2024-05-19 PROCEDURE — 36415 COLL VENOUS BLD VENIPUNCTURE: CPT

## 2024-05-19 PROCEDURE — 93970 EXTREMITY STUDY: CPT

## 2024-05-19 PROCEDURE — 99291 CRITICAL CARE FIRST HOUR: CPT | Performed by: STUDENT IN AN ORGANIZED HEALTH CARE EDUCATION/TRAINING PROGRAM

## 2024-05-19 PROCEDURE — 86147 CARDIOLIPIN ANTIBODY EA IG: CPT

## 2024-05-19 PROCEDURE — 80048 BASIC METABOLIC PNL TOTAL CA: CPT

## 2024-05-19 PROCEDURE — 99233 SBSQ HOSP IP/OBS HIGH 50: CPT | Performed by: INTERNAL MEDICINE

## 2024-05-19 PROCEDURE — 82962 GLUCOSE BLOOD TEST: CPT

## 2024-05-19 PROCEDURE — 6360000002 HC RX W HCPCS: Performed by: STUDENT IN AN ORGANIZED HEALTH CARE EDUCATION/TRAINING PROGRAM

## 2024-05-19 PROCEDURE — 84484 ASSAY OF TROPONIN QUANT: CPT

## 2024-05-19 PROCEDURE — 85613 RUSSELL VIPER VENOM DILUTED: CPT

## 2024-05-19 PROCEDURE — 6360000002 HC RX W HCPCS: Performed by: HOSPITALIST

## 2024-05-19 PROCEDURE — 1200000000 HC SEMI PRIVATE

## 2024-05-19 PROCEDURE — 6370000000 HC RX 637 (ALT 250 FOR IP): Performed by: HOSPITALIST

## 2024-05-19 PROCEDURE — 85730 THROMBOPLASTIN TIME PARTIAL: CPT

## 2024-05-19 PROCEDURE — 93005 ELECTROCARDIOGRAM TRACING: CPT | Performed by: STUDENT IN AN ORGANIZED HEALTH CARE EDUCATION/TRAINING PROGRAM

## 2024-05-19 PROCEDURE — 93005 ELECTROCARDIOGRAM TRACING: CPT | Performed by: INTERNAL MEDICINE

## 2024-05-19 PROCEDURE — 85025 COMPLETE CBC W/AUTO DIFF WBC: CPT

## 2024-05-19 PROCEDURE — 2580000003 HC RX 258: Performed by: HOSPITALIST

## 2024-05-19 RX ORDER — MORPHINE SULFATE 2 MG/ML
2 INJECTION, SOLUTION INTRAMUSCULAR; INTRAVENOUS EVERY 4 HOURS PRN
Status: DISCONTINUED | OUTPATIENT
Start: 2024-05-19 | End: 2024-05-20 | Stop reason: HOSPADM

## 2024-05-19 RX ORDER — MORPHINE SULFATE 2 MG/ML
INJECTION, SOLUTION INTRAMUSCULAR; INTRAVENOUS
Status: DISPENSED
Start: 2024-05-19 | End: 2024-05-20

## 2024-05-19 RX ORDER — MORPHINE SULFATE 2 MG/ML
2 INJECTION, SOLUTION INTRAMUSCULAR; INTRAVENOUS ONCE
Status: COMPLETED | OUTPATIENT
Start: 2024-05-19 | End: 2024-05-19

## 2024-05-19 RX ADMIN — OLANZAPINE 10 MG: 10 TABLET, ORALLY DISINTEGRATING ORAL at 09:21

## 2024-05-19 RX ADMIN — LITHIUM CARBONATE 300 MG: 300 CAPSULE, GELATIN COATED ORAL at 12:57

## 2024-05-19 RX ADMIN — LITHIUM CARBONATE 300 MG: 300 CAPSULE, GELATIN COATED ORAL at 17:22

## 2024-05-19 RX ADMIN — SODIUM CHLORIDE, PRESERVATIVE FREE 10 ML: 5 INJECTION INTRAVENOUS at 21:20

## 2024-05-19 RX ADMIN — LACOSAMIDE 150 MG: 50 TABLET, FILM COATED ORAL at 21:25

## 2024-05-19 RX ADMIN — HEPARIN SODIUM 7 UNITS/KG/HR: 10000 INJECTION, SOLUTION INTRAVENOUS at 12:56

## 2024-05-19 RX ADMIN — DIVALPROEX SODIUM 500 MG: 250 TABLET, DELAYED RELEASE ORAL at 17:22

## 2024-05-19 RX ADMIN — PANTOPRAZOLE SODIUM 40 MG: 40 TABLET, DELAYED RELEASE ORAL at 09:21

## 2024-05-19 RX ADMIN — LITHIUM CARBONATE 300 MG: 300 CAPSULE, GELATIN COATED ORAL at 09:21

## 2024-05-19 RX ADMIN — DIVALPROEX SODIUM 500 MG: 250 TABLET, DELAYED RELEASE ORAL at 09:21

## 2024-05-19 RX ADMIN — HEPARIN SODIUM 10 UNITS/KG/HR: 10000 INJECTION, SOLUTION INTRAVENOUS at 11:00

## 2024-05-19 RX ADMIN — LEVETIRACETAM 1000 MG: 500 TABLET, FILM COATED ORAL at 21:21

## 2024-05-19 RX ADMIN — ACETAMINOPHEN 650 MG: 325 TABLET ORAL at 05:45

## 2024-05-19 RX ADMIN — LEVETIRACETAM 1000 MG: 500 TABLET, FILM COATED ORAL at 09:21

## 2024-05-19 RX ADMIN — AMLODIPINE BESYLATE 5 MG: 5 TABLET ORAL at 09:21

## 2024-05-19 RX ADMIN — DIVALPROEX SODIUM 500 MG: 250 TABLET, DELAYED RELEASE ORAL at 12:57

## 2024-05-19 RX ADMIN — METOPROLOL SUCCINATE 25 MG: 25 TABLET, FILM COATED, EXTENDED RELEASE ORAL at 09:21

## 2024-05-19 RX ADMIN — SODIUM CHLORIDE, PRESERVATIVE FREE 10 ML: 5 INJECTION INTRAVENOUS at 09:26

## 2024-05-19 RX ADMIN — MORPHINE SULFATE 2 MG: 2 INJECTION, SOLUTION INTRAMUSCULAR; INTRAVENOUS at 21:58

## 2024-05-19 RX ADMIN — LACOSAMIDE 150 MG: 50 TABLET, FILM COATED ORAL at 09:21

## 2024-05-19 RX ADMIN — PRAZOSIN HYDROCHLORIDE 4 MG: 2 CAPSULE ORAL at 21:21

## 2024-05-19 RX ADMIN — PRAZOSIN HYDROCHLORIDE 1 MG: 1 CAPSULE ORAL at 21:21

## 2024-05-19 RX ADMIN — FLUOXETINE HYDROCHLORIDE 20 MG: 20 CAPSULE ORAL at 09:21

## 2024-05-19 RX ADMIN — IBUPROFEN 800 MG: 800 TABLET, FILM COATED ORAL at 17:22

## 2024-05-19 RX ADMIN — OLANZAPINE 10 MG: 10 TABLET, ORALLY DISINTEGRATING ORAL at 21:21

## 2024-05-19 ASSESSMENT — PAIN SCALES - GENERAL
PAINLEVEL_OUTOF10: 3
PAINLEVEL_OUTOF10: 5
PAINLEVEL_OUTOF10: 10
PAINLEVEL_OUTOF10: 5
PAINLEVEL_OUTOF10: 10
PAINLEVEL_OUTOF10: 6

## 2024-05-19 ASSESSMENT — PAIN DESCRIPTION - LOCATION
LOCATION: CHEST

## 2024-05-19 ASSESSMENT — PAIN DESCRIPTION - DESCRIPTORS: DESCRIPTORS: SHARP

## 2024-05-19 ASSESSMENT — PAIN DESCRIPTION - ORIENTATION
ORIENTATION: LEFT;RIGHT;MID
ORIENTATION: RIGHT

## 2024-05-19 NOTE — PROGRESS NOTES
Received into 547 via cart from ED. Assessment per flowsheet. Oriented to environment. Mother at bedside.

## 2024-05-19 NOTE — PROGRESS NOTES
0945 PTT sent for labs, call received, not enough blood in tube to run. STAT IV team consult placed for redraw.

## 2024-05-19 NOTE — PROGRESS NOTES
Patient experiencing chest pain, reached out to Dr Rapp. Stat EKG and troponin ordered and a consult to [facility] cardiology.

## 2024-05-19 NOTE — PROGRESS NOTES
[facility] Hospitalist Progress Note    Admitting Date and Time: 5/18/2024  2:13 PM  Admit Dx: Shortness of breath [R06.02]  Acute pulmonary embolism without acute cor pulmonale, unspecified pulmonary embolism type (HCC) [I26.99]  Periorbital cellulitis [L03.213]    Subjective:  Patient is being followed for Shortness of breath [R06.02]  Acute pulmonary embolism without acute cor pulmonale, unspecified pulmonary embolism type (HCC) [I26.99]  Periorbital cellulitis [L03.213]     Currently patient denies any chest pain    ROS: denies fever, chills, cp, sob, n/v, HA unless stated above.      acetaminophen  650 mg Oral Once    amLODIPine  5 mg Oral Daily    divalproex  500 mg Oral TID    FLUoxetine  20 mg Oral Daily    lacosamide  150 mg Oral BID    levETIRAcetam  1,000 mg Oral BID    lithium  300 mg Oral TID WC    melatonin  3 mg Oral Nightly    metoprolol succinate  25 mg Oral Daily    nicotine  1 patch TransDERmal Daily    OLANZapine zydis  10 mg Oral BID    pantoprazole  40 mg Oral Daily    sodium chloride flush  5-40 mL IntraVENous 2 times per day    prazosin  4 mg Oral Nightly    And    prazosin  1 mg Oral Nightly     heparin (porcine), 10,000 Units, PRN  heparin (porcine), 5,000 Units, PRN  ibuprofen, 800 mg, Q8H PRN  sodium chloride flush, 5-40 mL, PRN  sodium chloride, , PRN  potassium chloride, 40 mEq, PRN   Or  potassium alternative oral replacement, 40 mEq, PRN   Or  potassium chloride, 10 mEq, PRN  magnesium sulfate, 2,000 mg, PRN  ondansetron, 4 mg, Q8H PRN   Or  ondansetron, 4 mg, Q6H PRN  polyethylene glycol, 17 g, Daily PRN  acetaminophen, 650 mg, Q6H PRN   Or  acetaminophen, 650 mg, Q6H PRN  perflutren lipid microspheres, 1.5 mL, ONCE PRN         Objective:    BP (!) 143/76   Pulse 92   Temp 97.9 °F (36.6 °C) (Oral)   Resp 20   Ht 1.626 m (5' 4\")   Wt (!) 146.2 kg (322 lb 5 oz)   SpO2 95%   BMI 55.32 kg/m²     General Appearance: alert and oriented to person, place and time and in no

## 2024-05-19 NOTE — PROGRESS NOTES
4 Eyes Skin Assessment     NAME:  Guero Bragg  YOB: 2000  MEDICAL RECORD NUMBER:  [id number]    The patient is being assessed for  Admission    I agree that at least one RN has performed a thorough Head to Toe Skin Assessment on the patient. ALL assessment sites listed below have been assessed.      Areas assessed by both nurses:    Head, Face, Ears, Shoulders, Back, Chest, Arms, Elbows, Hands, Sacrum. Buttock, Coccyx, Ischium, Legs. Feet and Heels, and Under Medical Devices         Does the Patient have a Wound? No noted wound(s)       Shimon Prevention initiated by RN: No  Wound Care Orders initiated by RN: No    Pressure Injury (Stage 3,4, Unstageable, DTI, NWPT, and Complex wounds) if present, place Wound referral order by RN under : No    New Ostomies, if present place, Ostomy referral order under : No     Nurse 1 eSignature: Electronically signed by Marcel Dukes RN on 5/18/24 at 10:48 PM EDT    **SHARE this note so that the co-signing nurse can place an eSignature**    Nurse 2 eSignature: Electronically signed by Danica Zarate RN on 5/18/24 at 11:05 PM EDT

## 2024-05-20 ENCOUNTER — APPOINTMENT (OUTPATIENT)
Age: 24
DRG: 176 | End: 2024-05-20
Attending: HOSPITALIST
Payer: COMMERCIAL

## 2024-05-20 VITALS
HEART RATE: 74 BPM | OXYGEN SATURATION: 94 % | SYSTOLIC BLOOD PRESSURE: 130 MMHG | BODY MASS INDEX: 50.02 KG/M2 | RESPIRATION RATE: 16 BRPM | DIASTOLIC BLOOD PRESSURE: 77 MMHG | WEIGHT: 293 LBS | TEMPERATURE: 99 F | HEIGHT: 64 IN

## 2024-05-20 LAB
ANION GAP SERPL CALCULATED.3IONS-SCNC: 9 MMOL/L (ref 7–16)
BASOPHILS # BLD: 0 K/UL (ref 0–0.2)
BASOPHILS NFR BLD: 0 % (ref 0–2)
BUN SERPL-MCNC: 6 MG/DL (ref 6–20)
CALCIUM SERPL-MCNC: 8.8 MG/DL (ref 8.6–10.2)
CHLORIDE SERPL-SCNC: 115 MMOL/L (ref 98–107)
CHOLEST SERPL-MCNC: 241 MG/DL
CO2 SERPL-SCNC: 21 MMOL/L (ref 22–29)
CREAT SERPL-MCNC: 1 MG/DL (ref 0.5–1)
DILUTE RUSSELL VIPER VENOM TIME: NEGATIVE
ECHO AO ASC DIAM: 2.9 CM
ECHO AO ASCENDING AORTA INDEX: 1.2 CM/M2
ECHO AO ROOT DIAM: 2.5 CM
ECHO AO ROOT INDEX: 1.03 CM/M2
ECHO AO SINUS VALSALVA DIAM: 2.5 CM
ECHO AO SINUS VALSALVA INDEX: 1.03 CM/M2
ECHO AV AREA PEAK VELOCITY: 2.2 CM2
ECHO AV AREA VTI: 2 CM2
ECHO AV AREA/BSA PEAK VELOCITY: 0.9 CM2/M2
ECHO AV AREA/BSA VTI: 0.8 CM2/M2
ECHO AV CUSP MM: 1.8 CM
ECHO AV MEAN GRADIENT: 8 MMHG
ECHO AV MEAN VELOCITY: 1.3 M/S
ECHO AV PEAK GRADIENT: 15 MMHG
ECHO AV PEAK VELOCITY: 1.9 M/S
ECHO AV VELOCITY RATIO: 0.74
ECHO AV VTI: 37.6 CM
ECHO BSA: 2.57 M2
ECHO EST RA PRESSURE: 3 MMHG
ECHO LA DIAMETER INDEX: 1.57 CM/M2
ECHO LA DIAMETER: 3.8 CM
ECHO LA TO AORTIC ROOT RATIO: 1.52
ECHO LA VOL A-L A2C: 41 ML (ref 22–52)
ECHO LA VOL A-L A4C: 41 ML (ref 22–52)
ECHO LA VOL MOD A2C: 38 ML (ref 22–52)
ECHO LA VOL MOD A4C: 38 ML (ref 22–52)
ECHO LA VOLUME AREA LENGTH: 44 ML
ECHO LA VOLUME INDEX A-L A2C: 17 ML/M2 (ref 16–34)
ECHO LA VOLUME INDEX A-L A4C: 17 ML/M2 (ref 16–34)
ECHO LA VOLUME INDEX AREA LENGTH: 18 ML/M2 (ref 16–34)
ECHO LA VOLUME INDEX MOD A2C: 16 ML/M2 (ref 16–34)
ECHO LA VOLUME INDEX MOD A4C: 16 ML/M2 (ref 16–34)
ECHO LV E' LATERAL VELOCITY: 17 CM/S
ECHO LV E' SEPTAL VELOCITY: 10 CM/S
ECHO LV EDV A2C: 75 ML
ECHO LV EDV A4C: 101 ML
ECHO LV EDV BP: 88 ML (ref 56–104)
ECHO LV EDV INDEX A4C: 42 ML/M2
ECHO LV EDV INDEX BP: 36 ML/M2
ECHO LV EDV NDEX A2C: 31 ML/M2
ECHO LV EJECTION FRACTION A2C: 61 %
ECHO LV EJECTION FRACTION A4C: 62 %
ECHO LV EJECTION FRACTION BIPLANE: 62 % (ref 55–100)
ECHO LV ESV A2C: 29 ML
ECHO LV ESV A4C: 38 ML
ECHO LV ESV BP: 34 ML (ref 19–49)
ECHO LV ESV INDEX A2C: 12 ML/M2
ECHO LV ESV INDEX A4C: 16 ML/M2
ECHO LV ESV INDEX BP: 14 ML/M2
ECHO LV FRACTIONAL SHORTENING: 36 % (ref 28–44)
ECHO LV INTERNAL DIMENSION DIASTOLE INDEX: 2.19 CM/M2
ECHO LV INTERNAL DIMENSION DIASTOLIC: 5.3 CM (ref 3.9–5.3)
ECHO LV INTERNAL DIMENSION SYSTOLIC INDEX: 1.4 CM/M2
ECHO LV INTERNAL DIMENSION SYSTOLIC: 3.4 CM
ECHO LV ISOVOLUMETRIC RELAXATION TIME (IVRT): 72.7 MS
ECHO LV IVSD: 1.1 CM (ref 0.6–0.9)
ECHO LV IVSS: 1.4 CM
ECHO LV MASS 2D: 227.7 G (ref 67–162)
ECHO LV MASS INDEX 2D: 94.1 G/M2 (ref 43–95)
ECHO LV POSTERIOR WALL DIASTOLIC: 1.1 CM (ref 0.6–0.9)
ECHO LV POSTERIOR WALL SYSTOLIC: 1.5 CM
ECHO LV RELATIVE WALL THICKNESS RATIO: 0.42
ECHO LVOT AREA: 3.1 CM2
ECHO LVOT AV VTI INDEX: 0.65
ECHO LVOT DIAM: 2 CM
ECHO LVOT MEAN GRADIENT: 4 MMHG
ECHO LVOT PEAK GRADIENT: 8 MMHG
ECHO LVOT PEAK VELOCITY: 1.4 M/S
ECHO LVOT STROKE VOLUME INDEX: 31.7 ML/M2
ECHO LVOT SV: 76.6 ML
ECHO LVOT VTI: 24.4 CM
ECHO MV "A" WAVE DURATION: 83 MSEC
ECHO MV A VELOCITY: 0.73 M/S
ECHO MV AREA PHT: 3.6 CM2
ECHO MV AREA VTI: 2.9 CM2
ECHO MV E DECELERATION TIME (DT): 193.2 MS
ECHO MV E VELOCITY: 1.18 M/S
ECHO MV E/A RATIO: 1.62
ECHO MV E/E' LATERAL: 6.94
ECHO MV E/E' RATIO (AVERAGED): 9.37
ECHO MV E/E' SEPTAL: 11.8
ECHO MV LVOT VTI INDEX: 1.07
ECHO MV MAX VELOCITY: 1.2 M/S
ECHO MV MEAN GRADIENT: 3 MMHG
ECHO MV MEAN VELOCITY: 0.8 M/S
ECHO MV PEAK GRADIENT: 5 MMHG
ECHO MV PRESSURE HALF TIME (PHT): 61.3 MS
ECHO MV VTI: 26 CM
ECHO PV MAX VELOCITY: 1.2 M/S
ECHO PV MEAN GRADIENT: 3 MMHG
ECHO PV MEAN VELOCITY: 0.8 M/S
ECHO PV PEAK GRADIENT: 5 MMHG
ECHO PV VTI: 22.6 CM
ECHO PVEIN A DURATION: 92.3 MS
ECHO PVEIN A VELOCITY: 0.2 M/S
ECHO PVEIN PEAK D VELOCITY: 0.4 M/S
ECHO PVEIN PEAK S VELOCITY: 0.5 M/S
ECHO PVEIN S/D RATIO: 1.3
ECHO RIGHT VENTRICULAR SYSTOLIC PRESSURE (RVSP): 26 MMHG
ECHO RV INTERNAL DIMENSION: 2.8 CM
ECHO RV TAPSE: 2.4 CM (ref 1.7–?)
ECHO TV REGURGITANT MAX VELOCITY: 2.4 M/S
ECHO TV REGURGITANT PEAK GRADIENT: 23 MMHG
EKG ATRIAL RATE: 82 BPM
EKG ATRIAL RATE: 92 BPM
EKG ATRIAL RATE: 97 BPM
EKG P AXIS: 40 DEGREES
EKG P AXIS: 45 DEGREES
EKG P AXIS: 61 DEGREES
EKG P-R INTERVAL: 144 MS
EKG P-R INTERVAL: 150 MS
EKG P-R INTERVAL: 150 MS
EKG Q-T INTERVAL: 312 MS
EKG Q-T INTERVAL: 320 MS
EKG Q-T INTERVAL: 340 MS
EKG QRS DURATION: 74 MS
EKG QRS DURATION: 74 MS
EKG QRS DURATION: 78 MS
EKG QTC CALCULATION (BAZETT): 395 MS
EKG QTC CALCULATION (BAZETT): 396 MS
EKG QTC CALCULATION (BAZETT): 397 MS
EKG R AXIS: 36 DEGREES
EKG R AXIS: 45 DEGREES
EKG R AXIS: 69 DEGREES
EKG T AXIS: -18 DEGREES
EKG T AXIS: -25 DEGREES
EKG T AXIS: -38 DEGREES
EKG VENTRICULAR RATE: 82 BPM
EKG VENTRICULAR RATE: 92 BPM
EKG VENTRICULAR RATE: 97 BPM
EOSINOPHIL # BLD: 0.65 K/UL (ref 0.05–0.5)
EOSINOPHILS RELATIVE PERCENT: 5 % (ref 0–6)
ERYTHROCYTE [DISTWIDTH] IN BLOOD BY AUTOMATED COUNT: 16 % (ref 11.5–15)
GFR, ESTIMATED: 82 ML/MIN/1.73M2
GLUCOSE SERPL-MCNC: 124 MG/DL (ref 74–99)
HCT VFR BLD AUTO: 39.3 % (ref 34–48)
HDLC SERPL-MCNC: 37 MG/DL
HGB BLD-MCNC: 11.4 G/DL (ref 11.5–15.5)
LDLC SERPL CALC-MCNC: 168 MG/DL
LYMPHOCYTES NFR BLD: 3.91 K/UL (ref 1.5–4)
LYMPHOCYTES RELATIVE PERCENT: 31 % (ref 20–42)
MCH RBC QN AUTO: 27 PG (ref 26–35)
MCHC RBC AUTO-ENTMCNC: 29 G/DL (ref 32–34.5)
MCV RBC AUTO: 93.1 FL (ref 80–99.9)
MICROORGANISM SPEC CULT: ABNORMAL
MONOCYTES NFR BLD: 0.54 K/UL (ref 0.1–0.95)
MONOCYTES NFR BLD: 4 % (ref 2–12)
MYELOCYTES ABSOLUTE COUNT: 0.11 K/UL
MYELOCYTES: 1 %
NEUTROPHILS NFR BLD: 58 % (ref 43–80)
NEUTS SEG NFR BLD: 7.28 K/UL (ref 1.8–7.3)
PARTIAL THROMBOPLASTIN TIME: 58.5 SEC (ref 24.5–35.1)
PLATELET # BLD AUTO: 217 K/UL (ref 130–450)
PMV BLD AUTO: 11 FL (ref 7–12)
POTASSIUM SERPL-SCNC: 4.6 MMOL/L (ref 3.5–5)
RBC # BLD AUTO: 4.22 M/UL (ref 3.5–5.5)
RBC # BLD: ABNORMAL 10*6/UL
SODIUM SERPL-SCNC: 145 MMOL/L (ref 132–146)
SPECIMEN DESCRIPTION: ABNORMAL
TRIGL SERPL-MCNC: 179 MG/DL
VLDLC SERPL CALC-MCNC: 36 MG/DL
WBC OTHER # BLD: 12.5 K/UL (ref 4.5–11.5)

## 2024-05-20 PROCEDURE — 85730 THROMBOPLASTIN TIME PARTIAL: CPT

## 2024-05-20 PROCEDURE — 80061 LIPID PANEL: CPT

## 2024-05-20 PROCEDURE — 6360000002 HC RX W HCPCS: Performed by: STUDENT IN AN ORGANIZED HEALTH CARE EDUCATION/TRAINING PROGRAM

## 2024-05-20 PROCEDURE — 85025 COMPLETE CBC W/AUTO DIFF WBC: CPT

## 2024-05-20 PROCEDURE — 6370000000 HC RX 637 (ALT 250 FOR IP): Performed by: INTERNAL MEDICINE

## 2024-05-20 PROCEDURE — 2580000003 HC RX 258: Performed by: HOSPITALIST

## 2024-05-20 PROCEDURE — 93306 TTE W/DOPPLER COMPLETE: CPT | Performed by: INTERNAL MEDICINE

## 2024-05-20 PROCEDURE — 36415 COLL VENOUS BLD VENIPUNCTURE: CPT

## 2024-05-20 PROCEDURE — C8929 TTE W OR WO FOL WCON,DOPPLER: HCPCS

## 2024-05-20 PROCEDURE — 6370000000 HC RX 637 (ALT 250 FOR IP): Performed by: HOSPITALIST

## 2024-05-20 PROCEDURE — 93010 ELECTROCARDIOGRAM REPORT: CPT | Performed by: INTERNAL MEDICINE

## 2024-05-20 PROCEDURE — 6360000002 HC RX W HCPCS: Performed by: INTERNAL MEDICINE

## 2024-05-20 PROCEDURE — 99223 1ST HOSP IP/OBS HIGH 75: CPT | Performed by: INTERNAL MEDICINE

## 2024-05-20 PROCEDURE — 80048 BASIC METABOLIC PNL TOTAL CA: CPT

## 2024-05-20 PROCEDURE — 99239 HOSP IP/OBS DSCHRG MGMT >30: CPT | Performed by: INTERNAL MEDICINE

## 2024-05-20 RX ORDER — ENOXAPARIN SODIUM 150 MG/ML
1 INJECTION SUBCUTANEOUS 2 TIMES DAILY
Qty: 10 ML | Refills: 0 | Status: SHIPPED | OUTPATIENT
Start: 2024-05-20 | End: 2024-05-20

## 2024-05-20 RX ORDER — WARFARIN SODIUM 5 MG/1
5 TABLET ORAL DAILY
Status: DISCONTINUED | OUTPATIENT
Start: 2024-05-20 | End: 2024-05-20 | Stop reason: HOSPADM

## 2024-05-20 RX ORDER — ENOXAPARIN SODIUM 150 MG/ML
1 INJECTION SUBCUTANEOUS 2 TIMES DAILY
Status: DISCONTINUED | OUTPATIENT
Start: 2024-05-20 | End: 2024-05-20 | Stop reason: HOSPADM

## 2024-05-20 RX ORDER — WARFARIN SODIUM 5 MG/1
5 TABLET ORAL DAILY
Qty: 30 TABLET | Refills: 3 | Status: SHIPPED | OUTPATIENT
Start: 2024-05-20 | End: 2024-05-20

## 2024-05-20 RX ORDER — WARFARIN SODIUM 5 MG/1
5 TABLET ORAL DAILY
Qty: 30 TABLET | Refills: 3 | Status: SHIPPED | OUTPATIENT
Start: 2024-05-20

## 2024-05-20 RX ORDER — ENOXAPARIN SODIUM 150 MG/ML
1 INJECTION SUBCUTANEOUS 2 TIMES DAILY
Qty: 10 ML | Refills: 0 | Status: SHIPPED | OUTPATIENT
Start: 2024-05-20 | End: 2024-05-25

## 2024-05-20 RX ORDER — ENOXAPARIN SODIUM 150 MG/ML
1 INJECTION SUBCUTANEOUS DAILY
Status: DISCONTINUED | OUTPATIENT
Start: 2024-05-20 | End: 2024-05-20 | Stop reason: DRUGHIGH

## 2024-05-20 RX ADMIN — ENOXAPARIN SODIUM 150 MG: 150 INJECTION SUBCUTANEOUS at 19:24

## 2024-05-20 RX ADMIN — LEVETIRACETAM 1000 MG: 500 TABLET, FILM COATED ORAL at 09:16

## 2024-05-20 RX ADMIN — METOPROLOL SUCCINATE 25 MG: 25 TABLET, FILM COATED, EXTENDED RELEASE ORAL at 09:16

## 2024-05-20 RX ADMIN — LACOSAMIDE 150 MG: 50 TABLET, FILM COATED ORAL at 19:23

## 2024-05-20 RX ADMIN — DIVALPROEX SODIUM 500 MG: 250 TABLET, DELAYED RELEASE ORAL at 09:15

## 2024-05-20 RX ADMIN — LITHIUM CARBONATE 300 MG: 300 CAPSULE, GELATIN COATED ORAL at 16:59

## 2024-05-20 RX ADMIN — LEVETIRACETAM 1000 MG: 500 TABLET, FILM COATED ORAL at 19:23

## 2024-05-20 RX ADMIN — FLUOXETINE HYDROCHLORIDE 20 MG: 20 CAPSULE ORAL at 09:16

## 2024-05-20 RX ADMIN — PRAZOSIN HYDROCHLORIDE 4 MG: 2 CAPSULE ORAL at 19:23

## 2024-05-20 RX ADMIN — ENOXAPARIN SODIUM 150 MG: 150 INJECTION SUBCUTANEOUS at 10:45

## 2024-05-20 RX ADMIN — AMLODIPINE BESYLATE 5 MG: 5 TABLET ORAL at 09:15

## 2024-05-20 RX ADMIN — IBUPROFEN 800 MG: 800 TABLET, FILM COATED ORAL at 17:01

## 2024-05-20 RX ADMIN — DIVALPROEX SODIUM 500 MG: 250 TABLET, DELAYED RELEASE ORAL at 14:19

## 2024-05-20 RX ADMIN — LITHIUM CARBONATE 300 MG: 300 CAPSULE, GELATIN COATED ORAL at 11:51

## 2024-05-20 RX ADMIN — LACOSAMIDE 150 MG: 50 TABLET, FILM COATED ORAL at 09:16

## 2024-05-20 RX ADMIN — PRAZOSIN HYDROCHLORIDE 1 MG: 1 CAPSULE ORAL at 19:24

## 2024-05-20 RX ADMIN — WARFARIN SODIUM 5 MG: 5 TABLET ORAL at 18:09

## 2024-05-20 RX ADMIN — SODIUM CHLORIDE, PRESERVATIVE FREE 10 ML: 5 INJECTION INTRAVENOUS at 09:17

## 2024-05-20 RX ADMIN — DIVALPROEX SODIUM 500 MG: 250 TABLET, DELAYED RELEASE ORAL at 18:09

## 2024-05-20 RX ADMIN — OLANZAPINE 10 MG: 10 TABLET, ORALLY DISINTEGRATING ORAL at 09:15

## 2024-05-20 RX ADMIN — IBUPROFEN 800 MG: 800 TABLET, FILM COATED ORAL at 06:38

## 2024-05-20 RX ADMIN — OLANZAPINE 10 MG: 10 TABLET, ORALLY DISINTEGRATING ORAL at 19:23

## 2024-05-20 RX ADMIN — MORPHINE SULFATE 2 MG: 2 INJECTION, SOLUTION INTRAMUSCULAR; INTRAVENOUS at 07:04

## 2024-05-20 RX ADMIN — PANTOPRAZOLE SODIUM 40 MG: 40 TABLET, DELAYED RELEASE ORAL at 09:16

## 2024-05-20 RX ADMIN — LITHIUM CARBONATE 300 MG: 300 CAPSULE, GELATIN COATED ORAL at 09:16

## 2024-05-20 ASSESSMENT — PAIN DESCRIPTION - ORIENTATION: ORIENTATION: RIGHT;LEFT

## 2024-05-20 ASSESSMENT — PAIN SCALES - GENERAL
PAINLEVEL_OUTOF10: 7
PAINLEVEL_OUTOF10: 0
PAINLEVEL_OUTOF10: 6
PAINLEVEL_OUTOF10: 1
PAINLEVEL_OUTOF10: 10

## 2024-05-20 ASSESSMENT — PAIN SCALES - WONG BAKER
WONGBAKER_NUMERICALRESPONSE: NO HURT
WONGBAKER_NUMERICALRESPONSE: NO HURT

## 2024-05-20 ASSESSMENT — PAIN - FUNCTIONAL ASSESSMENT: PAIN_FUNCTIONAL_ASSESSMENT: ACTIVITIES ARE NOT PREVENTED

## 2024-05-20 ASSESSMENT — PAIN DESCRIPTION - DESCRIPTORS: DESCRIPTORS: ACHING

## 2024-05-20 ASSESSMENT — PAIN DESCRIPTION - LOCATION
LOCATION: CHEST
LOCATION: HEAD
LOCATION: CHEST

## 2024-05-20 NOTE — DISCHARGE INSTRUCTIONS
Patient is to e discharged on lovenox 1 mg/kg twice a day and coumadin 5 mg po daily. Check INR daily for 5 days with results to be sent to Dr Wolf. Adjust coumadin dose to keep INR 2-3.

## 2024-05-20 NOTE — CARE COORDINATION
5/20/2024  Social Work Discharge Planning:Acute pulm embolism.Kim discussed discharge planning with group home director Sharee.Pt is from the group home and uses no DME.  Awaiting therapy evals;however, Plan is to return to group home with [facility] who will provide lovanox injections.  Referral was made to [facility]. They dont accept Pts ins. Referral was made to [facility]-they are following. Order is in. The group home will transport Pt back-call Sharee at [phone].  Room air. Kim left two voicemail's regarding Pts lovanox and preferred pharmacy since [facility] cannot fill it. Script needs to be sent to [phone]  [phone]. KIM notified nurse. Electronically signed by SKYLAR Guerrero on 5/20/2024 at 11:38 AM      5/20/2024  Social Work Discharge Planning:KIM faxed Pts scripts to pharmacy listed above. Electronically signed by SKYLAR Guerrero on 5/20/2024 at 2:27 PM

## 2024-05-20 NOTE — CARE COORDINATION
5/20/2024  Social Work Discharge Planning:Acute pulm embolism.Kim discussed discharge planning with group home director Sharee.Pt is from the group home and uses no DME.  Awaiting therapy evals;however, Plan is to return to group [place] with [facility] who will provide lovanox injections.  Referral was made to [facility]. Waiting reply.  Order will be needed. The group home will transport Pt back-call Sharee at [phone].  Room air. Electronically signed by SKYLAR Guerrero on 5/20/2024 at 11:37 AM

## 2024-05-20 NOTE — PROGRESS NOTES
Upon assessing the patient, patient appeared very lethargic. Th patients caregiver at bedside that this is not the patients baseline. Pt. Also c/o 10/10 chest pain at that time an this author called for an RRT..

## 2024-05-20 NOTE — PROGRESS NOTES
Spoke with Sharee at [phone] from [facility]. The patient Mom will be transporting patient at discharge. Patient Mom is enroute to the hospital.

## 2024-05-20 NOTE — DISCHARGE SUMMARY
[facility] Hospitalist Physician Discharge Summary       Cristo Morelos, DO  [address] [facility] [address]  [phone]    Follow up  follow up, As needed    Liya Mendoza DO  [address] [facility] [address]  [phone]    Follow up  follow up, As needed    [facility]  [address] [address]  [phone]          Activity level: As tolerated     Dispo: Home      Condition on discharge: Stable     Patient ID:  Guero Bragg  [id number]  23 y.o.  2000    Admit date: 5/18/2024    Discharge date and time:  5/20/2024  12:36 PM    Admission Diagnoses: Principal Problem:    Acute pulmonary embolism without acute cor pulmonale (HCC)  Active Problems:    Seizure disorder (HCC)    Severe manic bipolar 1 disorder with psychotic behavior (HCC)    Intellectual disability    Periorbital cellulitis    Obesity with serious comorbidity    Primary hypertension    Acute pulmonary embolism without acute cor pulmonale, unspecified pulmonary embolism type (HCC)    Acute chest pain  Resolved Problems:    * No resolved hospital problems. *      Discharge Diagnoses: Principal Problem:    Acute pulmonary embolism without acute cor pulmonale (HCC)  Active Problems:    Seizure disorder (HCC)    Severe manic bipolar 1 disorder with psychotic behavior (HCC)    Intellectual disability    Periorbital cellulitis    Obesity with serious comorbidity    Primary hypertension    Acute pulmonary embolism without acute cor pulmonale, unspecified pulmonary embolism type (HCC)    Acute chest pain  Resolved Problems:    * No resolved hospital problems. *      Consults:  IP CONSULT TO HEM/ONC  IP CONSULT TO IV TEAM  IP CONSULT TO CARDIOLOGY    Procedures: None    Hospital Course:   Patient Guero Bragg is a 23 y.o. presented with Shortness of breath [R06.02]  Acute pulmonary embolism without acute cor pulmonale, unspecified pulmonary embolism type (HCC) [I26.99]  Periorbital

## 2024-05-20 NOTE — SIGNIFICANT EVENT
RRT note    ID was called on the patient at around 9:30 PM as the patient was complaining of chest pain.  On examination in the room patient was lying comfortably in the bed but complaining of left shoulder and left upper chest pain radiating to the left arm.  Vitals-blood pressure 138/70, pulse 80, respirate 16, saturation 97% on room air    Examination  General-lying comfortably in bed, no acute distress  CVS-S1-S2 normal, no murmur rub or gallop  Pulmonary-clear to auscultation bilaterally no added sounds  Abdomen soft nontender nondistended bowel sounds present  Extremities-no edema or cyanosis  Neuro-no status change at baseline    Twelve-lead EKG was done-which showed normal sinus rhythm    Ongoing back and history she had this chest pain going on since admission.  Reviewed previous labs which were negative for any troponin elevation  We reordered labs including CBC, CMP and troponin  2 mg of morphine was ordered, given  Patient's pain was relieved.    Labs was awaited    Differentials include pain due to PE, acute chest pain, musculoskeletal pain    More than 35 minutes of critical time was spent.

## 2024-05-20 NOTE — PROGRESS NOTES
DVT Prophylaxis Adjustment Policy (DVT Prophylaxis)     This patient is on DVT Prophylaxis medication that requires a dose adjustment      Date Body Weight IBW  Adjusted BW SCr  CrCl Dialysis status   5/20/2024 (!) 149.6 kg (329 lb 12.9 oz) Ideal body weight: 54.7 kg (120 lb 9.5 oz)  Adjusted ideal body weight: 92.7 kg (204 lb 4.5 oz) Serum creatinine: 1 mg/dL 05/20/24 0735  Estimated creatinine clearance: 129 mL/min N/a       Pharmacy has dose-adjusted the DVT Prophylaxis regimen to match   the recommendations from the following table        Ordered Medication:Lovenox 1 mg/kg daily    Order Changed/converted to: Lovenox 1 mg/kg twice daily      These changes were made per protocol according to the [facility] Pharmacist   Review for Appropriate Use and Automatic Dose Adjustments of   Subcutaneous Anticoagulants Policy     *Please note this dose may need readjusted if patient's condition changes.    Please contact pharmacy with any questions regarding these changes.    sherley chen RPH  5/20/2024  10:21 AM

## 2024-05-20 NOTE — CONSULTS
mg Oral Daily Ravi Luz MD   5 mg at 05/20/24 0915    divalproex (DEPAKOTE) DR tablet 500 mg  500 mg Oral TID Ravi Luz MD   500 mg at 05/20/24 0915    FLUoxetine (PROZAC) capsule 20 mg  20 mg Oral Daily Ravi Luz MD   20 mg at 05/20/24 0916    ibuprofen (ADVIL;MOTRIN) tablet 800 mg  800 mg Oral Q8H PRN Ravi Luz MD   800 mg at 05/20/24 0638    lacosamide (VIMPAT) tablet 150 mg  150 mg Oral BID Ravi Luz MD   150 mg at 05/20/24 0916    levETIRAcetam (KEPPRA) tablet 1,000 mg  1,000 mg Oral BID Ravi Luz MD   1,000 mg at 05/20/24 0916    lithium capsule 300 mg  300 mg Oral TID WC Ravi Luz MD   300 mg at 05/20/24 0916    melatonin tablet 3 mg  3 mg Oral Nightly Ravi Luz MD   3 mg at 05/18/24 2315    metoprolol succinate (TOPROL XL) extended release tablet 25 mg  25 mg Oral Daily Ravi Luz MD   25 mg at 05/20/24 0916    nicotine (NICODERM CQ) 21 MG/24HR 1 patch  1 patch TransDERmal Daily Ravi Luz MD   1 patch at 05/20/24 0912    OLANZapine zydis (ZYPREXA) disintegrating tablet 10 mg  10 mg Oral BID Ravi Luz MD   10 mg at 05/20/24 0915    pantoprazole (PROTONIX) tablet 40 mg  40 mg Oral Daily Ravi Luz MD   40 mg at 05/20/24 0916    sodium chloride flush 0.9 % injection 5-40 mL  5-40 mL IntraVENous 2 times per day Ravi Luz MD   10 mL at 05/20/24 0917    sodium chloride flush 0.9 % injection 5-40 mL  5-40 mL IntraVENous PRN Ravi Luz MD        0.9 % sodium chloride infusion   IntraVENous PRN Ravi Luz MD        potassium chloride (KLOR-CON M) extended release tablet 40 mEq  40 mEq Oral PRN Ravi Luz MD        Or    potassium bicarb-citric acid (EFFER-K) effervescent tablet 40 mEq  40 mEq Oral PRN Ravi Luz MD        Or    potassium chloride 10 mEq/100 mL IVPB (Peripheral Line)  10 mEq IntraVENous PRN Ravi Luz MD        magnesium sulfate 2000 mg in 50 mL IVPB premix  2,000 mg IntraVENous PRN Ravi Luz MD        ondansetron (ZOFRAN-ODT) disintegrating tablet 4 mg  4 mg Oral Q8H 
RALPH Suresh CNP   lithium 300 MG capsule Take 1 capsule by mouth 3 times daily (with meals) 10/1/22 3/9/23  Shelley Urrutia APRN - CNP   ibuprofen (ADVIL;MOTRIN) 800 MG tablet Take 1 tablet by mouth every 8 hours as needed for Pain 10/1/22   Baltazar Izquierdo DO   pantoprazole (PROTONIX) 40 MG tablet Take 1 tablet by mouth daily    Provider, Guevara, MD       Allergies  Allergies   Allergen Reactions    Dye [Barium-Containing Compounds] Anaphylaxis and Hives    Sulfa Antibiotics Anaphylaxis, Hives and Itching    Versed [Midazolam] Itching, Rash and Other (See Comments)     Crawling feeling on skin       Review of Systems: Refer to HPI      Objective  BP (!) 143/76   Pulse 92   Temp 97.9 °F (36.6 °C) (Oral)   Resp 20   Ht 1.626 m (5' 4\")   Wt (!) 146.2 kg (322 lb 5 oz)   SpO2 95%   BMI 55.32 kg/m²     Physical Exam:   General: Alert, in no acute distress,   Head and neck : PERRLA, EOMI . Sclera non icteric.  Oropharynx : Clear  Neck: no JVD,  no adenopathy,   Heart: Regular rate and regular rhythm  Lungs: Clear to auscultation   Extremities: 1+ edema   Abdomen: Soft, non-tender;no masses, no organomegaly  Neurologic:Cranial nerves grossly intact. No focal motor or sensory deficits .    Recent Laboratory Data-   Lab Results   Component Value Date    WBC 8.2 05/18/2024    HGB 12.4 05/18/2024    HCT 41.3 05/18/2024    MCV 91.2 05/18/2024     05/18/2024    LYMPHOPCT 35 05/18/2024    RBC 4.53 05/18/2024    MCH 27.4 05/18/2024    MCHC 30.0 (L) 05/18/2024    RDW 15.5 (H) 05/18/2024    NEUTOPHILPCT 45 05/18/2024    MONOPCT 15 (H) 05/18/2024    EOSPCT 5 05/18/2024    BASOPCT 0 05/18/2024    NEUTROABS 3.87 05/18/2024    MONOSABS 1.29 (H) 05/18/2024    EOSABS 0.43 05/18/2024    BASOSABS 0.00 05/18/2024       Lab Results   Component Value Date     05/18/2024    K 4.6 05/18/2024     05/18/2024    CO2 23 05/18/2024    BUN 5 (L) 05/18/2024    CREATININE 1.2 (H) 05/18/2024    GLUCOSE 153 (H)

## 2024-05-20 NOTE — PROGRESS NOTES
Patient Mom arrived to transport patient home and was asking about discharge planning, stating that patient is not retuning to group home, which group home also confirmed staff member Sharee that patient is going home. Mother stated she had told  earlier in the day, this RN left message with  on call Corrine.

## 2024-05-21 NOTE — CARE COORDINATION
5/21/2024  Social Work Discharge Planning:Sw found out this morning that Pts mother took Pt home last night instead of Pt returning to the group home. SW called Pts mother just now and mother said she decided at the last minute to take Pt home due to having a clot and uncomfortable for her to return to the group home. When SW asked about what Pt will need, mother said an external catha and hospital bed. [facility] is following so SW called liaison who said he will take care of everything and get back to this worker if he needs anything from me. JACOB told Brannon with [facility] that mother \"thinks\" Pts PCP is Jacki and not Tamy, but SW gave him both of the PCP numbers. Brannon is calling Pts mother and will get things set up for the mothers home rather than group home.Electronically signed by SKYLAR Guerrero on 5/21/2024 at 7:54 AM

## 2024-05-22 LAB
CARDIOLIPIN IGA SER IA-ACNC: 2.4 APL (ref 0–14)
CARDIOLIPIN IGG SER IA-ACNC: 1.6 GPL (ref 0–10)
CARDIOLIPIN IGM SER IA-ACNC: 3.9 MPL (ref 0–10)

## 2024-05-24 ENCOUNTER — APPOINTMENT (OUTPATIENT)
Dept: CT IMAGING | Age: 24
End: 2024-05-24
Payer: COMMERCIAL

## 2024-05-24 ENCOUNTER — HOSPITAL ENCOUNTER (EMERGENCY)
Age: 24
Discharge: HOME | End: 2024-05-24
Attending: EMERGENCY MEDICINE
Payer: COMMERCIAL

## 2024-05-24 VITALS
HEART RATE: 85 BPM | DIASTOLIC BLOOD PRESSURE: 88 MMHG | TEMPERATURE: 98.6 F | OXYGEN SATURATION: 96 % | SYSTOLIC BLOOD PRESSURE: 133 MMHG | RESPIRATION RATE: 16 BRPM

## 2024-05-24 DIAGNOSIS — R10.84 GENERALIZED ABDOMINAL PAIN: ICD-10-CM

## 2024-05-24 DIAGNOSIS — R07.9 CHEST PAIN, UNSPECIFIED TYPE: Primary | ICD-10-CM

## 2024-05-24 DIAGNOSIS — Z51.81 SUBTHERAPEUTIC ANTICOAGULATION: ICD-10-CM

## 2024-05-24 DIAGNOSIS — Z79.01 SUBTHERAPEUTIC ANTICOAGULATION: ICD-10-CM

## 2024-05-24 DIAGNOSIS — R06.02 SHORTNESS OF BREATH: ICD-10-CM

## 2024-05-24 LAB
ALBUMIN SERPL-MCNC: 4.1 G/DL (ref 3.5–5.2)
ALP SERPL-CCNC: 57 U/L (ref 35–104)
ALT SERPL-CCNC: 33 U/L (ref 0–32)
ANION GAP SERPL CALCULATED.3IONS-SCNC: 10 MMOL/L (ref 7–16)
AST SERPL-CCNC: 27 U/L (ref 0–31)
B PARAP IS1001 DNA NPH QL NAA+NON-PROBE: NOT DETECTED
B PERT DNA SPEC QL NAA+PROBE: NOT DETECTED
BASOPHILS # BLD: 0.05 K/UL (ref 0–0.2)
BASOPHILS NFR BLD: 1 % (ref 0–2)
BILIRUB SERPL-MCNC: 0.2 MG/DL (ref 0–1.2)
BILIRUB UR QL STRIP: NEGATIVE
BNP SERPL-MCNC: 59 PG/ML (ref 0–450)
BUN SERPL-MCNC: 6 MG/DL (ref 6–20)
C PNEUM DNA NPH QL NAA+NON-PROBE: NOT DETECTED
CALCIUM SERPL-MCNC: 9.1 MG/DL (ref 8.6–10.2)
CHLORIDE SERPL-SCNC: 104 MMOL/L (ref 98–107)
CLARITY UR: CLEAR
CO2 SERPL-SCNC: 22 MMOL/L (ref 22–29)
COLOR UR: YELLOW
CREAT SERPL-MCNC: 0.9 MG/DL (ref 0.5–1)
EOSINOPHIL # BLD: 0.53 K/UL (ref 0.05–0.5)
EOSINOPHILS RELATIVE PERCENT: 6 % (ref 0–6)
ERYTHROCYTE [DISTWIDTH] IN BLOOD BY AUTOMATED COUNT: 15.4 % (ref 11.5–15)
FLUAV RNA NPH QL NAA+NON-PROBE: NOT DETECTED
FLUBV RNA NPH QL NAA+NON-PROBE: NOT DETECTED
GFR, ESTIMATED: >90 ML/MIN/1.73M2
GLUCOSE SERPL-MCNC: 182 MG/DL (ref 74–99)
GLUCOSE UR STRIP-MCNC: NEGATIVE MG/DL
HADV DNA NPH QL NAA+NON-PROBE: NOT DETECTED
HCG, URINE, POC: NEGATIVE
HCOV 229E RNA NPH QL NAA+NON-PROBE: NOT DETECTED
HCOV HKU1 RNA NPH QL NAA+NON-PROBE: NOT DETECTED
HCOV NL63 RNA NPH QL NAA+NON-PROBE: NOT DETECTED
HCOV OC43 RNA NPH QL NAA+NON-PROBE: NOT DETECTED
HCT VFR BLD AUTO: 38.4 % (ref 34–48)
HGB BLD-MCNC: 11.5 G/DL (ref 11.5–15.5)
HGB UR QL STRIP.AUTO: ABNORMAL
HMPV RNA NPH QL NAA+NON-PROBE: NOT DETECTED
HPIV1 RNA NPH QL NAA+NON-PROBE: NOT DETECTED
HPIV2 RNA NPH QL NAA+NON-PROBE: NOT DETECTED
HPIV3 RNA NPH QL NAA+NON-PROBE: NOT DETECTED
HPIV4 RNA NPH QL NAA+NON-PROBE: NOT DETECTED
IMM GRANULOCYTES # BLD AUTO: 0.29 K/UL (ref 0–0.58)
IMM GRANULOCYTES NFR BLD: 3 % (ref 0–5)
INR PPP: 1.3
KETONES UR STRIP-MCNC: NEGATIVE MG/DL
LACTATE BLDV-SCNC: 1.5 MMOL/L (ref 0.5–2.2)
LEUKOCYTE ESTERASE UR QL STRIP: NEGATIVE
LIPASE SERPL-CCNC: 35 U/L (ref 13–60)
LYMPHOCYTES NFR BLD: 3.67 K/UL (ref 1.5–4)
LYMPHOCYTES RELATIVE PERCENT: 43 % (ref 20–42)
Lab: NORMAL
M PNEUMO DNA NPH QL NAA+NON-PROBE: NOT DETECTED
MCH RBC QN AUTO: 26.9 PG (ref 26–35)
MCHC RBC AUTO-ENTMCNC: 29.9 G/DL (ref 32–34.5)
MCV RBC AUTO: 89.9 FL (ref 80–99.9)
MONOCYTES NFR BLD: 1.01 K/UL (ref 0.1–0.95)
MONOCYTES NFR BLD: 12 % (ref 2–12)
NEGATIVE QC PASS/FAIL: NORMAL
NEUTROPHILS NFR BLD: 35 % (ref 43–80)
NEUTS SEG NFR BLD: 2.98 K/UL (ref 1.8–7.3)
NITRITE UR QL STRIP: NEGATIVE
PH UR STRIP: 7 [PH] (ref 5–9)
PLATELET, FLUORESCENCE: 233 K/UL (ref 130–450)
PMV BLD AUTO: 11.2 FL (ref 7–12)
POSITIVE QC PASS/FAIL: NORMAL
POTASSIUM SERPL-SCNC: 4.3 MMOL/L (ref 3.5–5)
PROT SERPL-MCNC: 7.2 G/DL (ref 6.4–8.3)
PROT UR STRIP-MCNC: NEGATIVE MG/DL
PROTHROMBIN TIME: 14.7 SEC (ref 9.3–12.4)
RBC # BLD AUTO: 4.27 M/UL (ref 3.5–5.5)
RBC #/AREA URNS HPF: ABNORMAL /HPF
RSV RNA NPH QL NAA+NON-PROBE: NOT DETECTED
RV+EV RNA NPH QL NAA+NON-PROBE: NOT DETECTED
SARS-COV-2 RNA NPH QL NAA+NON-PROBE: NOT DETECTED
SODIUM SERPL-SCNC: 136 MMOL/L (ref 132–146)
SP GR UR STRIP: 1.01 (ref 1–1.03)
SPECIMEN DESCRIPTION: NORMAL
UROBILINOGEN UR STRIP-ACNC: 0.2 EU/DL (ref 0–1)
WBC #/AREA URNS HPF: ABNORMAL /HPF
WBC OTHER # BLD: 8.5 K/UL (ref 4.5–11.5)

## 2024-05-24 PROCEDURE — 83605 ASSAY OF LACTIC ACID: CPT

## 2024-05-24 PROCEDURE — 96375 TX/PRO/DX INJ NEW DRUG ADDON: CPT

## 2024-05-24 PROCEDURE — 6370000000 HC RX 637 (ALT 250 FOR IP)

## 2024-05-24 PROCEDURE — 80053 COMPREHEN METABOLIC PANEL: CPT

## 2024-05-24 PROCEDURE — 0202U NFCT DS 22 TRGT SARS-COV-2: CPT

## 2024-05-24 PROCEDURE — 2580000003 HC RX 258

## 2024-05-24 PROCEDURE — 83690 ASSAY OF LIPASE: CPT

## 2024-05-24 PROCEDURE — 81001 URINALYSIS AUTO W/SCOPE: CPT

## 2024-05-24 PROCEDURE — 99285 EMERGENCY DEPT VISIT HI MDM: CPT

## 2024-05-24 PROCEDURE — 6360000004 HC RX CONTRAST MEDICATION: Performed by: RADIOLOGY

## 2024-05-24 PROCEDURE — 85610 PROTHROMBIN TIME: CPT

## 2024-05-24 PROCEDURE — 83880 ASSAY OF NATRIURETIC PEPTIDE: CPT

## 2024-05-24 PROCEDURE — 71275 CT ANGIOGRAPHY CHEST: CPT

## 2024-05-24 PROCEDURE — 74177 CT ABD & PELVIS W/CONTRAST: CPT

## 2024-05-24 PROCEDURE — 96374 THER/PROPH/DIAG INJ IV PUSH: CPT

## 2024-05-24 PROCEDURE — 85025 COMPLETE CBC W/AUTO DIFF WBC: CPT

## 2024-05-24 PROCEDURE — 6360000002 HC RX W HCPCS

## 2024-05-24 RX ORDER — WARFARIN SODIUM 7.5 MG/1
7.5 TABLET ORAL
Status: COMPLETED | OUTPATIENT
Start: 2024-05-24 | End: 2024-05-24

## 2024-05-24 RX ORDER — DIPHENHYDRAMINE HYDROCHLORIDE 50 MG/ML
50 INJECTION INTRAMUSCULAR; INTRAVENOUS ONCE
Status: COMPLETED | OUTPATIENT
Start: 2024-05-24 | End: 2024-05-24

## 2024-05-24 RX ORDER — ENOXAPARIN SODIUM 150 MG/ML
150 INJECTION SUBCUTANEOUS 2 TIMES DAILY
Qty: 14 ML | Refills: 0 | Status: SHIPPED | OUTPATIENT
Start: 2024-05-24 | End: 2024-05-31

## 2024-05-24 RX ORDER — WARFARIN SODIUM 7.5 MG/1
TABLET ORAL
Qty: 7 TABLET | Refills: 0 | Status: SHIPPED | OUTPATIENT
Start: 2024-05-24

## 2024-05-24 RX ORDER — FENTANYL CITRATE 0.05 MG/ML
50 INJECTION, SOLUTION INTRAMUSCULAR; INTRAVENOUS ONCE
Status: COMPLETED | OUTPATIENT
Start: 2024-05-24 | End: 2024-05-24

## 2024-05-24 RX ADMIN — FENTANYL CITRATE 50 MCG: 0.05 INJECTION, SOLUTION INTRAMUSCULAR; INTRAVENOUS at 17:49

## 2024-05-24 RX ADMIN — IOPAMIDOL 75 ML: 755 INJECTION, SOLUTION INTRAVENOUS at 19:17

## 2024-05-24 RX ADMIN — WARFARIN SODIUM 7.5 MG: 7.5 TABLET ORAL at 21:25

## 2024-05-24 RX ADMIN — DIPHENHYDRAMINE HYDROCHLORIDE 50 MG: 50 INJECTION INTRAMUSCULAR; INTRAVENOUS at 17:48

## 2024-05-24 RX ADMIN — WATER 125 MG: 1 INJECTION INTRAMUSCULAR; INTRAVENOUS; SUBCUTANEOUS at 17:49

## 2024-05-24 ASSESSMENT — PAIN SCALES - GENERAL: PAINLEVEL_OUTOF10: 10

## 2024-05-24 ASSESSMENT — PAIN DESCRIPTION - ORIENTATION: ORIENTATION: LEFT

## 2024-05-24 ASSESSMENT — PAIN DESCRIPTION - LOCATION: LOCATION: CHEST

## 2024-05-24 NOTE — ED PROVIDER NOTES
the following medications:  Medications   methylPREDNISolone sodium succ (SOLU-MEDROL) 125 mg in sterile water 2 mL injection (125 mg IntraVENous Given 5/24/24 1749)   diphenhydrAMINE (BENADRYL) injection 50 mg (50 mg IntraVENous Given 5/24/24 1748)   fentaNYL (SUBLIMAZE) injection 50 mcg (50 mcg IntraVENous Given 5/24/24 1749)   iopamidol (ISOVUE-370) 76 % injection 75 mL (75 mLs IntraVENous Given 5/24/24 1917)   warfarin (COUMADIN) tablet 7.5 mg (7.5 mg Oral Given 5/24/24 2125)           Is this patient to be included in the SEP-1 Core Measure due to severe sepsis or septic shock?   No   Exclusion criteria - the patient is NOT to be included for SEP-1 Core Measure due to:  Infection is not suspected        Medical Decision Making/Differential Diagnosis:    CC/HPI Summary, Social Determinants of health, Records Reviewed, DDx, testing done/not done, ED Course, Reassessment, disposition considerations/shared decision making with patient, consults, disposition:      ED Course as of 05/24/24 0021   Fri May 24, 2024   1703   ATTENDING PROVIDER ATTESTATION:     I have personally performed and/or participated in the history, exam, medical decision making, and procedures and agree with all pertinent clinical information unless otherwise noted.    I have also reviewed and agree with the past medical, family and social history unless otherwise noted.    I have discussed this patient in detail with the resident and provided the instruction and education regarding the evidence-based evaluation and treatment of CP.    Any EKG that may have been performed has been personally reviewed by me and I agree with the documentation as noted by the resident.    History: Patient was recently admitted and diagnosed with PE.  Was started on Coumadin.  Is currently taking Coumadin at home as well as Lovenox.  She is almost out of her Lovenox.  At her INR checked today and it was under 1.  Family is concerned because she is running out of

## 2024-05-25 NOTE — DISCHARGE INSTRUCTIONS
Please take 7.5 mg Coumadin tablets starting tomorrow for subtherapeutic INR.  Your Lovenox prescription has been prescribed at the same dose as prior.  Please have your INR checked by May 28/29 of this next week

## 2024-06-07 ENCOUNTER — APPOINTMENT (OUTPATIENT)
Dept: CT IMAGING | Age: 24
End: 2024-06-07
Payer: COMMERCIAL

## 2024-06-07 ENCOUNTER — HOSPITAL ENCOUNTER (EMERGENCY)
Age: 24
Discharge: HOME | End: 2024-06-07
Attending: EMERGENCY MEDICINE
Payer: COMMERCIAL

## 2024-06-07 VITALS
RESPIRATION RATE: 18 BRPM | DIASTOLIC BLOOD PRESSURE: 84 MMHG | OXYGEN SATURATION: 98 % | SYSTOLIC BLOOD PRESSURE: 142 MMHG | HEART RATE: 88 BPM | TEMPERATURE: 98.6 F

## 2024-06-07 DIAGNOSIS — T14.8XXA HEMATOMA: Primary | ICD-10-CM

## 2024-06-07 LAB
ALBUMIN SERPL-MCNC: 4 G/DL (ref 3.5–5.2)
ALP SERPL-CCNC: 54 U/L (ref 35–104)
ALT SERPL-CCNC: 13 U/L (ref 0–32)
ANION GAP SERPL CALCULATED.3IONS-SCNC: 8 MMOL/L (ref 7–16)
AST SERPL-CCNC: 15 U/L (ref 0–31)
BASOPHILS # BLD: 0 K/UL (ref 0–0.2)
BASOPHILS NFR BLD: 0 % (ref 0–2)
BILIRUB SERPL-MCNC: 0.2 MG/DL (ref 0–1.2)
BUN SERPL-MCNC: 8 MG/DL (ref 6–20)
CALCIUM SERPL-MCNC: 9.8 MG/DL (ref 8.6–10.2)
CHLORIDE SERPL-SCNC: 111 MMOL/L (ref 98–107)
CO2 SERPL-SCNC: 23 MMOL/L (ref 22–29)
CREAT SERPL-MCNC: 1.1 MG/DL (ref 0.5–1)
EOSINOPHIL # BLD: 0.19 K/UL (ref 0.05–0.5)
EOSINOPHILS RELATIVE PERCENT: 2 % (ref 0–6)
ERYTHROCYTE [DISTWIDTH] IN BLOOD BY AUTOMATED COUNT: 16.2 % (ref 11.5–15)
GFR, ESTIMATED: 76 ML/MIN/1.73M2
GLUCOSE SERPL-MCNC: 104 MG/DL (ref 74–99)
HCT VFR BLD AUTO: 36.4 % (ref 34–48)
HGB BLD-MCNC: 10.9 G/DL (ref 11.5–15.5)
INR PPP: 1.1
LACTATE BLDV-SCNC: 0.8 MMOL/L (ref 0.5–2.2)
LIPASE SERPL-CCNC: 28 U/L (ref 13–60)
LYMPHOCYTES NFR BLD: 2.62 K/UL (ref 1.5–4)
LYMPHOCYTES RELATIVE PERCENT: 27 % (ref 20–42)
MCH RBC QN AUTO: 27.3 PG (ref 26–35)
MCHC RBC AUTO-ENTMCNC: 29.9 G/DL (ref 32–34.5)
MCV RBC AUTO: 91 FL (ref 80–99.9)
METAMYELOCYTES ABSOLUTE COUNT: 0.39 K/UL (ref 0–0.12)
METAMYELOCYTES: 4 % (ref 0–1)
MONOCYTES NFR BLD: 1.16 K/UL (ref 0.1–0.95)
MONOCYTES NFR BLD: 12 % (ref 2–12)
MYELOCYTES ABSOLUTE COUNT: 0.39 K/UL
MYELOCYTES: 4 %
NEUTROPHILS NFR BLD: 51 % (ref 43–80)
NEUTS SEG NFR BLD: 4.95 K/UL (ref 1.8–7.3)
NUCLEATED RED BLOOD CELLS: 1 PER 100 WBC
PLATELET CONFIRMATION: NORMAL
PLATELET, FLUORESCENCE: 232 K/UL (ref 130–450)
PMV BLD AUTO: 10.8 FL (ref 7–12)
POTASSIUM SERPL-SCNC: 4.5 MMOL/L (ref 3.5–5)
PROT SERPL-MCNC: 7.3 G/DL (ref 6.4–8.3)
PROTHROMBIN TIME: 12.2 SEC (ref 9.3–12.4)
RBC # BLD AUTO: 4 M/UL (ref 3.5–5.5)
SODIUM SERPL-SCNC: 142 MMOL/L (ref 132–146)
WBC OTHER # BLD: 9.7 K/UL (ref 4.5–11.5)

## 2024-06-07 PROCEDURE — 85610 PROTHROMBIN TIME: CPT

## 2024-06-07 PROCEDURE — 76937 US GUIDE VASCULAR ACCESS: CPT

## 2024-06-07 PROCEDURE — 83605 ASSAY OF LACTIC ACID: CPT

## 2024-06-07 PROCEDURE — 96374 THER/PROPH/DIAG INJ IV PUSH: CPT

## 2024-06-07 PROCEDURE — 36415 COLL VENOUS BLD VENIPUNCTURE: CPT

## 2024-06-07 PROCEDURE — 85025 COMPLETE CBC W/AUTO DIFF WBC: CPT

## 2024-06-07 PROCEDURE — 83690 ASSAY OF LIPASE: CPT

## 2024-06-07 PROCEDURE — 6360000002 HC RX W HCPCS

## 2024-06-07 PROCEDURE — 96375 TX/PRO/DX INJ NEW DRUG ADDON: CPT

## 2024-06-07 PROCEDURE — 80053 COMPREHEN METABOLIC PANEL: CPT

## 2024-06-07 PROCEDURE — 2580000003 HC RX 258

## 2024-06-07 PROCEDURE — C1751 CATH, INF, PER/CENT/MIDLINE: HCPCS

## 2024-06-07 PROCEDURE — 99285 EMERGENCY DEPT VISIT HI MDM: CPT

## 2024-06-07 PROCEDURE — 74177 CT ABD & PELVIS W/CONTRAST: CPT

## 2024-06-07 PROCEDURE — 6360000004 HC RX CONTRAST MEDICATION: Performed by: RADIOLOGY

## 2024-06-07 RX ORDER — SODIUM CHLORIDE 0.9 % (FLUSH) 0.9 %
5-40 SYRINGE (ML) INJECTION PRN
Status: DISCONTINUED | OUTPATIENT
Start: 2024-06-07 | End: 2024-06-07 | Stop reason: HOSPADM

## 2024-06-07 RX ORDER — SODIUM CHLORIDE 9 MG/ML
INJECTION, SOLUTION INTRAVENOUS PRN
Status: DISCONTINUED | OUTPATIENT
Start: 2024-06-07 | End: 2024-06-07 | Stop reason: HOSPADM

## 2024-06-07 RX ORDER — HEPARIN 100 UNIT/ML
1 SYRINGE INTRAVENOUS EVERY 12 HOURS SCHEDULED
Status: DISCONTINUED | OUTPATIENT
Start: 2024-06-07 | End: 2024-06-07 | Stop reason: HOSPADM

## 2024-06-07 RX ORDER — SODIUM CHLORIDE 0.9 % (FLUSH) 0.9 %
5-40 SYRINGE (ML) INJECTION EVERY 12 HOURS SCHEDULED
Status: DISCONTINUED | OUTPATIENT
Start: 2024-06-07 | End: 2024-06-07 | Stop reason: HOSPADM

## 2024-06-07 RX ORDER — HEPARIN 100 UNIT/ML
1 SYRINGE INTRAVENOUS PRN
Status: DISCONTINUED | OUTPATIENT
Start: 2024-06-07 | End: 2024-06-07 | Stop reason: HOSPADM

## 2024-06-07 RX ORDER — DIPHENHYDRAMINE HYDROCHLORIDE 50 MG/ML
50 INJECTION INTRAMUSCULAR; INTRAVENOUS ONCE
Status: COMPLETED | OUTPATIENT
Start: 2024-06-07 | End: 2024-06-07

## 2024-06-07 RX ADMIN — IOPAMIDOL 75 ML: 755 INJECTION, SOLUTION INTRAVENOUS at 14:37

## 2024-06-07 RX ADMIN — WATER 40 MG: 1 INJECTION INTRAMUSCULAR; INTRAVENOUS; SUBCUTANEOUS at 13:04

## 2024-06-07 RX ADMIN — DIPHENHYDRAMINE HYDROCHLORIDE 50 MG: 50 INJECTION INTRAMUSCULAR; INTRAVENOUS at 13:04

## 2024-06-07 ASSESSMENT — PAIN SCALES - GENERAL: PAINLEVEL_OUTOF10: 10

## 2024-06-07 ASSESSMENT — PAIN - FUNCTIONAL ASSESSMENT: PAIN_FUNCTIONAL_ASSESSMENT: 0-10

## 2024-06-07 NOTE — ED PROVIDER NOTES
EXAM--------------------------------------  Constitutional/General: Alert and oriented x3, well appearing, non toxic in NAD  Head: Normocephalic and atraumatic  Eyes: EOMI, PERRL  Mouth: Oropharynx clear, handling secretions, no trismus  Neck: Supple, full ROM, no cervical spine tenderness.   Pulmonary: Lungs clear to auscultation bilaterally.  Cardiovascular:  Regular rate.Regular rhythm. +2 distal pulses  Chest: no chest wall tenderness  Abdomen: Soft.  Obese.  There is presence of about a 4 x 4 centimeter subcutaneous lump on bilateral side of the lower abdomen, no skin changes, mildly tender.  Musculoskeletal: Moves all extremities x 4.   Warm and well perfused.  No clubbing, cyanosis, or edema.   Compartments are soft and compressible.   Capillary refill <3 seconds.  Skin: warm and dry. No rashes.   Neurologic: GCS 15.   Facial symmetry.   Speech clear.    No meningeal signs.    -------------------------------------------------- RESULTS -------------------------------------------------  I have personally reviewed all laboratory and imaging results for this patient. Results are listed below.     LABS:  Results for orders placed or performed during the hospital encounter of 06/07/24   CBC with Auto Differential   Result Value Ref Range    WBC 9.7 4.5 - 11.5 k/uL    RBC 4.00 3.50 - 5.50 m/uL    Hemoglobin 10.9 (L) 11.5 - 15.5 g/dL    Hematocrit 36.4 34.0 - 48.0 %    MCV 91.0 80.0 - 99.9 fL    MCH 27.3 26.0 - 35.0 pg    MCHC 29.9 (L) 32.0 - 34.5 g/dL    RDW 16.2 (H) 11.5 - 15.0 %    MPV 10.8 7.0 - 12.0 fL    Platelet, Fluorescence 232 130 - 450 k/uL    Neutrophils % 51 43.0 - 80.0 %    Lymphocytes % 27 20.0 - 42.0 %    Monocytes % 12 2.0 - 12.0 %    Eosinophils % 2 0 - 6 %    Basophils % 0 0.0 - 2.0 %    Metamyelocytes 4 (H) 0 - 1 %    Myelocytes 4 (H) 0 %    nRBC 1 per 100 WBC    Neutrophils Absolute 4.95 1.80 - 7.30 k/uL    Lymphocytes Absolute 2.62 1.50 - 4.00 k/uL    Monocytes Absolute 1.16 (H) 0.10 - 0.95 k/uL

## 2024-06-07 NOTE — CARE COORDINATION
She was seen here 5/24/24 & d/c'd w script for warfarin 7.5 mg & lovenx. Fatmata from [facility] called SW & noted there is concern pt may not be getting warfarin. SW reviewed w/pharmacist and he noted concern as well. SW [facility]'s [facility] & noted script for Lovenox & warfarin was picked up 5/25.     JACOB called pt's mother who reported that she has been giving pt warfarin. She noted she will need to call Dr. Morelos's office to secure more. JACOB apprised her about above noted appointment 6/17 9:00am.     SW called [facility] [phone] & Sophie-  who noted pt is NOT Active w/them. She was closed for services in Sept 2022. Her staff may be funded via  Board.     [facility] SW left  for return call asap.     JACOB received call from [place]-  for [facility]. She noted pt is active w/them, but they are transferring case to [facility]. Fatmata Carlson is SSA & she will leave her message about case as she is out of office.    SW received call from [facility]. Informed her of this situation and concern that pt's mother is not dosing warfarin accurately. She noted she will have [facility]SA follow-up on Monday and speak to pt's mother.     Pt was able to be d/c'd and readmission not required.    Electronically signed by SKYLAR Diego on 6/7/2024 at 4:30 PM

## 2024-06-07 NOTE — DISCHARGE INSTRUCTIONS
You have appointment w/Shani Francis for 6/17 9:00am.   This will be @ [address] [phone].

## 2024-06-07 NOTE — PROCEDURES
PROCEDURE NOTE  Date: 6/7/2024   Name: Guero Bragg  YOB: 2000    Procedures        Extended dwell cath Placement 6/7/2024        Product number: [id number]   Lot Number: [id number]   Consult: limited access   Ultrasound: yes   Right Cephalic vein:                Upper Arm Circumference: (CM) nm    Size:(FR)/GUAGE 20    Exposed Length: (CM) 0    Internal Length: (CM) 10   Cut: (CM) 0   Vein Measurement: 0.5              Lidocaine Given: no    Tolerated well  Brisk blood return  Specimen collected  Flushed well and NSL  RN notified      Orlando Lainez RN  6/7/2024  12:57 PM

## 2024-07-29 ENCOUNTER — HOSPITAL ENCOUNTER (EMERGENCY)
Age: 24
Discharge: HOME | End: 2024-07-29
Attending: FAMILY MEDICINE
Payer: COMMERCIAL

## 2024-07-29 VITALS
SYSTOLIC BLOOD PRESSURE: 146 MMHG | HEART RATE: 93 BPM | BODY MASS INDEX: 56.61 KG/M2 | HEIGHT: 64 IN | DIASTOLIC BLOOD PRESSURE: 112 MMHG | TEMPERATURE: 98.1 F

## 2024-07-29 DIAGNOSIS — R30.0 DYSURIA: Primary | ICD-10-CM

## 2024-07-29 DIAGNOSIS — Z76.0 ENCOUNTER FOR MEDICATION REFILL: ICD-10-CM

## 2024-07-29 LAB
BACTERIA URNS QL MICRO: ABNORMAL
BILIRUB UR QL STRIP: NEGATIVE
CLARITY UR: ABNORMAL
COLOR UR: YELLOW
EPI CELLS #/AREA URNS HPF: ABNORMAL /HPF
GLUCOSE UR STRIP-MCNC: NEGATIVE MG/DL
HGB UR QL STRIP.AUTO: ABNORMAL
KETONES UR STRIP-MCNC: NEGATIVE MG/DL
LEUKOCYTE ESTERASE UR QL STRIP: NEGATIVE
NITRITE UR QL STRIP: NEGATIVE
PH UR STRIP: 7 [PH] (ref 5–9)
PROT UR STRIP-MCNC: NEGATIVE MG/DL
RBC #/AREA URNS HPF: ABNORMAL /HPF
SP GR UR STRIP: 1.01 (ref 1–1.03)
UROBILINOGEN UR STRIP-ACNC: 0.2 EU/DL (ref 0–1)
WBC #/AREA URNS HPF: ABNORMAL /HPF

## 2024-07-29 PROCEDURE — 99283 EMERGENCY DEPT VISIT LOW MDM: CPT

## 2024-07-29 PROCEDURE — 81001 URINALYSIS AUTO W/SCOPE: CPT

## 2024-07-29 RX ORDER — PANTOPRAZOLE SODIUM 40 MG/1
40 TABLET, DELAYED RELEASE ORAL DAILY
Qty: 30 TABLET | Refills: 0 | Status: SHIPPED | OUTPATIENT
Start: 2024-07-29

## 2024-07-29 RX ORDER — METOPROLOL SUCCINATE 25 MG/1
25 TABLET, EXTENDED RELEASE ORAL DAILY
Qty: 30 TABLET | Refills: 0 | Status: SHIPPED | OUTPATIENT
Start: 2024-07-29

## 2024-07-29 ASSESSMENT — PAIN DESCRIPTION - LOCATION: LOCATION: ABDOMEN

## 2024-07-29 ASSESSMENT — PAIN SCALES - WONG BAKER: WONGBAKER_NUMERICALRESPONSE: HURTS LITTLE MORE

## 2024-07-29 ASSESSMENT — PAIN DESCRIPTION - ORIENTATION: ORIENTATION: RIGHT;LEFT

## 2024-07-29 NOTE — ED PROVIDER NOTES
HPI:  7/29/24,   Time: 9:16 AM EDT         Guero Bragg is a 24 y.o. female presenting to the ED for pain with urination and lower abdominal discomfort noted and noticing some blood in her urine for about 3 days.  She presents with her mother.  She needs refills on pantoprazole and metoprolol but she does have a scheduled appointment with her primary care provider in about 3 weeks.  Her mother reports that she is on the Depo-Provera shot and got her last one about a month ago.  However she has had from time to time, some breakthrough bleeding.  Mother admits that the patient does have some incontinence of urine problem.        ROS:   Pertinent positives and negatives are stated within HPI, all other systems reviewed and are negative.  --------------------------------------------- PAST HISTORY ---------------------------------------------  Past Medical History:  has a past medical history of ADHD, Autism disorder, Colitis, Schizophrenia (HCC), and Seizures (HCC).    Past Surgical History:  has a past surgical history that includes Breast surgery (Right) and laparoscopy (Left, 8/9/2019).    Social History:  reports that she has never smoked. She has never used smokeless tobacco. She reports that she does not drink alcohol and does not use drugs.    Family History: family history includes Diabetes in her mother; Other in her mother.     The patient’s home medications have been reviewed.    Allergies: Dye [barium-containing compounds], Sulfa antibiotics, and Versed [midazolam]    -------------------------------------------------- RESULTS -------------------------------------------------  All laboratory and radiology results have been personally reviewed by myself   LABS:  Results for orders placed or performed during the hospital encounter of 07/29/24   Urinalysis with Microscopic   Result Value Ref Range    Color, UA Yellow Yellow    Turbidity UA SLIGHTLY CLOUDY (A) Clear    Glucose, Ur NEGATIVE NEGATIVE mg/dL

## 2024-08-21 ENCOUNTER — OFFICE VISIT (OUTPATIENT)
Dept: NEUROLOGY | Age: 24
End: 2024-08-21
Payer: COMMERCIAL

## 2024-08-21 VITALS
HEIGHT: 64 IN | DIASTOLIC BLOOD PRESSURE: 85 MMHG | WEIGHT: 293 LBS | BODY MASS INDEX: 50.02 KG/M2 | HEART RATE: 82 BPM | SYSTOLIC BLOOD PRESSURE: 130 MMHG | OXYGEN SATURATION: 98 %

## 2024-08-21 DIAGNOSIS — R56.9 SEIZURES (HCC): ICD-10-CM

## 2024-08-21 DIAGNOSIS — F84.0 AUTISM: Primary | ICD-10-CM

## 2024-08-21 PROCEDURE — 3075F SYST BP GE 130 - 139MM HG: CPT | Performed by: NURSE PRACTITIONER

## 2024-08-21 PROCEDURE — 99214 OFFICE O/P EST MOD 30 MIN: CPT | Performed by: NURSE PRACTITIONER

## 2024-08-21 PROCEDURE — 3079F DIAST BP 80-89 MM HG: CPT | Performed by: NURSE PRACTITIONER

## 2024-08-21 RX ORDER — LACOSAMIDE 150 MG/1
TABLET ORAL
Qty: 60 TABLET | Refills: 3 | Status: SHIPPED | OUTPATIENT
Start: 2024-08-21 | End: 2024-11-21

## 2024-08-21 NOTE — PROGRESS NOTES
II: visual acuity     II: visual fields Full    II: pupils ANTOINETTE   III,VII: ptosis None   III,IV,VI: extraocular muscles  EOMI without nystagmus   V: mastication Normal   V: facial light touch sensation  Normal   V,VII: corneal reflex     VII: facial muscle function - upper  Normal   VII: facial muscle function - lower Normal   VIII: hearing Normal   IX: soft palate elevation  Normal   IX,X: gag reflex    XI: trapezius strength  5/5   XI: sternocleidomastoid strength 5/5   XI: neck extension strength  5/5   XII: tongue strength  Normal     Motor:  4/5 throughout  Normal bulk and tone  No drift   No abnormal movements    Sensory:  LT normal    Coordination:   FN, FFM and DANA normal    Gait:  Normal    DTR:   2+ throughout    Laboratory/Radiology:  ry/Radiology:     No labs or images to review at the time of this visit    Assessment:     Seizure disorder in patient with autism  --- Currently controlled on Depakote and Keppra  --- Due to patient's history of psych disorder we switched Keppra to Vimpat due to Keppra side effects of irritability/agitation  --- No reported seizures > 2 year    Plan:     Continue Depakote 500 mg 3 times daily    Continue Vimpat 150 mg twice daily    Follow-up in 1 year    Call if any seizures occur or with questions/concerns      RALPH Franz CNP  1:22 PM  8/21/2024

## 2024-09-05 ENCOUNTER — HOSPITAL ENCOUNTER (EMERGENCY)
Age: 24
Discharge: HOME | End: 2024-09-06
Attending: EMERGENCY MEDICINE
Payer: COMMERCIAL

## 2024-09-05 ENCOUNTER — APPOINTMENT (OUTPATIENT)
Dept: CT IMAGING | Age: 24
End: 2024-09-05
Payer: COMMERCIAL

## 2024-09-05 DIAGNOSIS — L03.317 CELLULITIS OF BUTTOCK: Primary | ICD-10-CM

## 2024-09-05 DIAGNOSIS — D72.829 LEUKOCYTOSIS, UNSPECIFIED TYPE: ICD-10-CM

## 2024-09-05 LAB
ALBUMIN SERPL-MCNC: 4 G/DL (ref 3.5–5.2)
ALP SERPL-CCNC: 74 U/L (ref 35–104)
ALT SERPL-CCNC: 20 U/L (ref 0–32)
ANION GAP SERPL CALCULATED.3IONS-SCNC: 12 MMOL/L (ref 7–16)
AST SERPL-CCNC: 19 U/L (ref 0–31)
BASOPHILS # BLD: 0 K/UL (ref 0–0.2)
BASOPHILS NFR BLD: 0 % (ref 0–2)
BILIRUB SERPL-MCNC: 0.5 MG/DL (ref 0–1.2)
BILIRUB UR QL STRIP: NEGATIVE
BNP SERPL-MCNC: 64 PG/ML (ref 0–450)
BUN SERPL-MCNC: 6 MG/DL (ref 6–20)
CALCIUM SERPL-MCNC: 9.4 MG/DL (ref 8.6–10.2)
CHLORIDE SERPL-SCNC: 105 MMOL/L (ref 98–107)
CLARITY UR: CLEAR
CO2 SERPL-SCNC: 21 MMOL/L (ref 22–29)
COLOR UR: YELLOW
CREAT SERPL-MCNC: 1 MG/DL (ref 0.5–1)
EOSINOPHIL # BLD: 0.47 K/UL (ref 0.05–0.5)
EOSINOPHILS RELATIVE PERCENT: 4 % (ref 0–6)
ERYTHROCYTE [DISTWIDTH] IN BLOOD BY AUTOMATED COUNT: 16 % (ref 11.5–15)
GFR, ESTIMATED: 84 ML/MIN/1.73M2
GLUCOSE SERPL-MCNC: 144 MG/DL (ref 74–99)
GLUCOSE UR STRIP-MCNC: NEGATIVE MG/DL
HCG, URINE, POC: NEGATIVE
HCT VFR BLD AUTO: 38.5 % (ref 34–48)
HGB BLD-MCNC: 12 G/DL (ref 11.5–15.5)
HGB UR QL STRIP.AUTO: NEGATIVE
INR PPP: 1.5
KETONES UR STRIP-MCNC: NEGATIVE MG/DL
LACTATE BLDV-SCNC: 1.1 MMOL/L (ref 0.5–1.9)
LEUKOCYTE ESTERASE UR QL STRIP: NEGATIVE
LYMPHOCYTES NFR BLD: 3.2 K/UL (ref 1.5–4)
LYMPHOCYTES RELATIVE PERCENT: 24 % (ref 20–42)
Lab: NORMAL
MCH RBC QN AUTO: 27.2 PG (ref 26–35)
MCHC RBC AUTO-ENTMCNC: 31.2 G/DL (ref 32–34.5)
MCV RBC AUTO: 87.3 FL (ref 80–99.9)
MONOCYTES NFR BLD: 1.66 K/UL (ref 0.1–0.95)
MONOCYTES NFR BLD: 12 % (ref 2–12)
NEGATIVE QC PASS/FAIL: NORMAL
NEUTROPHILS NFR BLD: 60 % (ref 43–80)
NEUTS SEG NFR BLD: 8.06 K/UL (ref 1.8–7.3)
NITRITE UR QL STRIP: NEGATIVE
PH UR STRIP: 6.5 [PH] (ref 5–9)
PLATELET # BLD AUTO: 239 K/UL (ref 130–450)
PMV BLD AUTO: 10.6 FL (ref 7–12)
POSITIVE QC PASS/FAIL: NORMAL
POTASSIUM SERPL-SCNC: 4 MMOL/L (ref 3.5–5)
PROT SERPL-MCNC: 7.6 G/DL (ref 6.4–8.3)
PROT UR STRIP-MCNC: NEGATIVE MG/DL
PROTHROMBIN TIME: 16.2 SEC (ref 9.3–12.4)
RBC # BLD AUTO: 4.41 M/UL (ref 3.5–5.5)
RBC # BLD: ABNORMAL 10*6/UL
RBC #/AREA URNS HPF: ABNORMAL /HPF
SODIUM SERPL-SCNC: 138 MMOL/L (ref 132–146)
SP GR UR STRIP: <1.005 (ref 1–1.03)
UROBILINOGEN UR STRIP-ACNC: 0.2 EU/DL (ref 0–1)
WBC #/AREA URNS HPF: ABNORMAL /HPF
WBC OTHER # BLD: 13.4 K/UL (ref 4.5–11.5)

## 2024-09-05 PROCEDURE — 6360000002 HC RX W HCPCS

## 2024-09-05 PROCEDURE — 85025 COMPLETE CBC W/AUTO DIFF WBC: CPT

## 2024-09-05 PROCEDURE — 74177 CT ABD & PELVIS W/CONTRAST: CPT

## 2024-09-05 PROCEDURE — 96375 TX/PRO/DX INJ NEW DRUG ADDON: CPT

## 2024-09-05 PROCEDURE — 83880 ASSAY OF NATRIURETIC PEPTIDE: CPT

## 2024-09-05 PROCEDURE — 2580000003 HC RX 258

## 2024-09-05 PROCEDURE — 81001 URINALYSIS AUTO W/SCOPE: CPT

## 2024-09-05 PROCEDURE — 85610 PROTHROMBIN TIME: CPT

## 2024-09-05 PROCEDURE — 83605 ASSAY OF LACTIC ACID: CPT

## 2024-09-05 PROCEDURE — 96376 TX/PRO/DX INJ SAME DRUG ADON: CPT

## 2024-09-05 PROCEDURE — 96361 HYDRATE IV INFUSION ADD-ON: CPT

## 2024-09-05 PROCEDURE — 6360000002 HC RX W HCPCS: Performed by: EMERGENCY MEDICINE

## 2024-09-05 PROCEDURE — 87040 BLOOD CULTURE FOR BACTERIA: CPT

## 2024-09-05 PROCEDURE — 6360000004 HC RX CONTRAST MEDICATION: Performed by: RADIOLOGY

## 2024-09-05 PROCEDURE — 99285 EMERGENCY DEPT VISIT HI MDM: CPT

## 2024-09-05 PROCEDURE — 84145 PROCALCITONIN (PCT): CPT

## 2024-09-05 PROCEDURE — 80053 COMPREHEN METABOLIC PANEL: CPT

## 2024-09-05 RX ORDER — METOCLOPRAMIDE HYDROCHLORIDE 5 MG/ML
5 INJECTION INTRAMUSCULAR; INTRAVENOUS ONCE
Status: COMPLETED | OUTPATIENT
Start: 2024-09-05 | End: 2024-09-05

## 2024-09-05 RX ORDER — SODIUM CHLORIDE, SODIUM LACTATE, POTASSIUM CHLORIDE, AND CALCIUM CHLORIDE .6; .31; .03; .02 G/100ML; G/100ML; G/100ML; G/100ML
1000 INJECTION, SOLUTION INTRAVENOUS ONCE
Status: COMPLETED | OUTPATIENT
Start: 2024-09-05 | End: 2024-09-05

## 2024-09-05 RX ORDER — MORPHINE SULFATE 4 MG/ML
4 INJECTION, SOLUTION INTRAMUSCULAR; INTRAVENOUS ONCE
Status: COMPLETED | OUTPATIENT
Start: 2024-09-05 | End: 2024-09-05

## 2024-09-05 RX ORDER — IOPAMIDOL 755 MG/ML
70 INJECTION, SOLUTION INTRAVASCULAR
Status: COMPLETED | OUTPATIENT
Start: 2024-09-05 | End: 2024-09-05

## 2024-09-05 RX ORDER — MORPHINE SULFATE 2 MG/ML
2 INJECTION, SOLUTION INTRAMUSCULAR; INTRAVENOUS
Status: DISCONTINUED | OUTPATIENT
Start: 2024-09-05 | End: 2024-09-06 | Stop reason: HOSPADM

## 2024-09-05 RX ORDER — ONDANSETRON 2 MG/ML
4 INJECTION INTRAMUSCULAR; INTRAVENOUS ONCE
Status: COMPLETED | OUTPATIENT
Start: 2024-09-05 | End: 2024-09-05

## 2024-09-05 RX ORDER — MORPHINE SULFATE 4 MG/ML
4 INJECTION, SOLUTION INTRAMUSCULAR; INTRAVENOUS
Status: DISCONTINUED | OUTPATIENT
Start: 2024-09-05 | End: 2024-09-06 | Stop reason: HOSPADM

## 2024-09-05 RX ORDER — SODIUM CHLORIDE, SODIUM LACTATE, POTASSIUM CHLORIDE, CALCIUM CHLORIDE 600; 310; 30; 20 MG/100ML; MG/100ML; MG/100ML; MG/100ML
INJECTION, SOLUTION INTRAVENOUS
Status: COMPLETED
Start: 2024-09-05 | End: 2024-09-05

## 2024-09-05 RX ORDER — DIPHENHYDRAMINE HYDROCHLORIDE 50 MG/ML
25 INJECTION INTRAMUSCULAR; INTRAVENOUS ONCE
Status: COMPLETED | OUTPATIENT
Start: 2024-09-05 | End: 2024-09-05

## 2024-09-05 RX ADMIN — WATER 125 MG: 1 INJECTION INTRAMUSCULAR; INTRAVENOUS; SUBCUTANEOUS at 20:34

## 2024-09-05 RX ADMIN — SODIUM CHLORIDE, POTASSIUM CHLORIDE, SODIUM LACTATE AND CALCIUM CHLORIDE 1000 ML: 600; 310; 30; 20 INJECTION, SOLUTION INTRAVENOUS at 20:35

## 2024-09-05 RX ADMIN — METOCLOPRAMIDE 5 MG: 5 INJECTION, SOLUTION INTRAMUSCULAR; INTRAVENOUS at 23:27

## 2024-09-05 RX ADMIN — SODIUM CHLORIDE, SODIUM LACTATE, POTASSIUM CHLORIDE, AND CALCIUM CHLORIDE 1000 ML: .6; .31; .03; .02 INJECTION, SOLUTION INTRAVENOUS at 20:35

## 2024-09-05 RX ADMIN — DIPHENHYDRAMINE HYDROCHLORIDE 25 MG: 50 INJECTION INTRAMUSCULAR; INTRAVENOUS at 20:34

## 2024-09-05 RX ADMIN — MORPHINE SULFATE 4 MG: 4 INJECTION, SOLUTION INTRAMUSCULAR; INTRAVENOUS at 23:29

## 2024-09-05 RX ADMIN — MORPHINE SULFATE 4 MG: 4 INJECTION, SOLUTION INTRAMUSCULAR; INTRAVENOUS at 20:34

## 2024-09-05 RX ADMIN — IOPAMIDOL 70 ML: 755 INJECTION, SOLUTION INTRAVENOUS at 21:36

## 2024-09-05 RX ADMIN — ONDANSETRON 4 MG: 2 INJECTION, SOLUTION INTRAMUSCULAR; INTRAVENOUS at 20:34

## 2024-09-05 ASSESSMENT — PAIN DESCRIPTION - DESCRIPTORS: DESCRIPTORS: DISCOMFORT;DULL;GNAWING

## 2024-09-05 ASSESSMENT — ENCOUNTER SYMPTOMS
COLOR CHANGE: 1
ABDOMINAL PAIN: 0
ABDOMINAL DISTENTION: 0
SHORTNESS OF BREATH: 0

## 2024-09-05 ASSESSMENT — PAIN - FUNCTIONAL ASSESSMENT: PAIN_FUNCTIONAL_ASSESSMENT: ACTIVITIES ARE NOT PREVENTED

## 2024-09-05 ASSESSMENT — PAIN DESCRIPTION - ORIENTATION: ORIENTATION: UPPER

## 2024-09-05 ASSESSMENT — PAIN DESCRIPTION - LOCATION: LOCATION: BUTTOCKS

## 2024-09-05 ASSESSMENT — PAIN SCALES - GENERAL: PAINLEVEL_OUTOF10: 10

## 2024-09-05 NOTE — ED PROVIDER NOTES
Department of Emergency Medicine  FIRST PROVIDER TRIAGE NOTE             Independent MLP           9/5/24  6:34 PM EDT    Date of Encounter: 9/5/24   MRN: [id number]      HPI: Guero Bragg is a 24 y.o. female who presents to the ED for Abscess (Left buttock abscess/pain x 4 days, chills, inter fever, vomiting, pain into leg, both legs swollen per family)      ROS: + abdominal pain,  fever as high as 100, Negative for diarrhea or urinary complaints.    PE: Gen Appearance/Constitutional: alert  Musculoskeletal: large erythematous indurated area left mid to inner buttock, moves all extremities x 4  Lymphatics: peripheral edema present    Provider-Patient relationship only established for Provider In Triage (PIT)  Full assessment, HPI and examination not performed, therefore, it is not yet possible to state whether or not an emergency medical condition exists   Focused assessment only during triage. This is not a comprehensive evaluation due to no physical ER space, staff shortage and high numbers of boarding patients in the ER     Initial Plan of Care: All treatment areas with department are currently occupied. Plan to order/Initiate the following while awaiting opening in ED: Proceed toTreatment Area When Bed Available for ED Attending/MLP to Continue Care    Electronically signed by RALPH Boone CNP   DD: 9/5/24       Sophie Matos APRN - CNP  09/05/24 [id number]

## 2024-09-06 VITALS
HEART RATE: 93 BPM | DIASTOLIC BLOOD PRESSURE: 5 MMHG | RESPIRATION RATE: 14 BRPM | WEIGHT: 293 LBS | SYSTOLIC BLOOD PRESSURE: 132 MMHG | TEMPERATURE: 97.8 F | BODY MASS INDEX: 56.3 KG/M2 | OXYGEN SATURATION: 94 %

## 2024-09-06 LAB — PROCALCITONIN SERPL-MCNC: 0.09 NG/ML (ref 0–0.08)

## 2024-09-06 PROCEDURE — 6360000002 HC RX W HCPCS

## 2024-09-06 PROCEDURE — 2580000003 HC RX 258: Performed by: EMERGENCY MEDICINE

## 2024-09-06 PROCEDURE — 96375 TX/PRO/DX INJ NEW DRUG ADDON: CPT

## 2024-09-06 PROCEDURE — 6360000002 HC RX W HCPCS: Performed by: EMERGENCY MEDICINE

## 2024-09-06 PROCEDURE — 96374 THER/PROPH/DIAG INJ IV PUSH: CPT

## 2024-09-06 PROCEDURE — 96365 THER/PROPH/DIAG IV INF INIT: CPT

## 2024-09-06 PROCEDURE — 96366 THER/PROPH/DIAG IV INF ADDON: CPT

## 2024-09-06 PROCEDURE — 2580000003 HC RX 258

## 2024-09-06 RX ORDER — CEPHALEXIN 500 MG/1
500 CAPSULE ORAL 4 TIMES DAILY
Qty: 56 CAPSULE | Refills: 0 | Status: SHIPPED | OUTPATIENT
Start: 2024-09-06 | End: 2024-09-20

## 2024-09-06 RX ORDER — MORPHINE SULFATE 4 MG/ML
4 INJECTION, SOLUTION INTRAMUSCULAR; INTRAVENOUS
Status: DISCONTINUED | OUTPATIENT
Start: 2024-09-06 | End: 2024-09-06 | Stop reason: HOSPADM

## 2024-09-06 RX ORDER — DOXYCYCLINE HYCLATE 100 MG
100 TABLET ORAL 2 TIMES DAILY
Qty: 14 TABLET | Refills: 0 | Status: SHIPPED | OUTPATIENT
Start: 2024-09-06 | End: 2024-09-13

## 2024-09-06 RX ORDER — ONDANSETRON 2 MG/ML
4 INJECTION INTRAMUSCULAR; INTRAVENOUS EVERY 6 HOURS PRN
Status: DISCONTINUED | OUTPATIENT
Start: 2024-09-06 | End: 2024-09-06 | Stop reason: HOSPADM

## 2024-09-06 RX ADMIN — WATER 2000 MG: 1 INJECTION INTRAMUSCULAR; INTRAVENOUS; SUBCUTANEOUS at 00:20

## 2024-09-06 RX ADMIN — MORPHINE SULFATE 2 MG: 2 INJECTION, SOLUTION INTRAMUSCULAR; INTRAVENOUS at 07:13

## 2024-09-06 RX ADMIN — VANCOMYCIN HYDROCHLORIDE 2500 MG: 10 INJECTION, POWDER, LYOPHILIZED, FOR SOLUTION INTRAVENOUS at 00:53

## 2024-09-06 ASSESSMENT — PAIN - FUNCTIONAL ASSESSMENT: PAIN_FUNCTIONAL_ASSESSMENT: ACTIVITIES ARE NOT PREVENTED

## 2024-09-06 ASSESSMENT — PAIN DESCRIPTION - LOCATION: LOCATION: CHEST

## 2024-09-06 ASSESSMENT — PAIN SCALES - GENERAL: PAINLEVEL_OUTOF10: 10

## 2024-09-06 ASSESSMENT — PAIN DESCRIPTION - ORIENTATION: ORIENTATION: MID

## 2024-09-06 ASSESSMENT — PAIN DESCRIPTION - DESCRIPTORS: DESCRIPTORS: STABBING;THROBBING

## 2024-09-06 NOTE — DISCHARGE INSTRUCTIONS
Please follow-up with your primary care doctor for further evaluation of your symptoms and for ER follow-up.  Please return to the ER for any new or worsening symptoms such as fevers cannot be controlled with Tylenol, nonstop vomiting, nonstop diarrhea.

## 2024-09-06 NOTE — ED PROVIDER NOTES
[facility] EMERGENCY DEPARTMENT  EMERGENCY DEPARTMENT ENCOUNTER      Pt Name: Guero Bragg  MRN: [id number]  Birthdate 2000  Date of evaluation: 9/5/2024  PCP: Cristo Morelos,     8:24 AM EDT  Vitals:    09/06/24 0713   BP:    Pulse:    Resp: 14   Temp:    SpO2:         Patient Care Assumed From: Previous resident    Sign-out received from out-going resident, assuming care of patient at this time. See outgoing resident note for further information obtained prior to signout. I have discussed the patient's initial exam, treatment and plan of care with the out going physician. I have introduced my self to the patient / family and have answered their questions to this point. I have examined the patient myself and reviewed ordered tests / medications and  reviewed any available results to this point.    Abbreviated history per resident: Patient presenting for 4 days of pain to the right gluteal cleft as well as redness.        Reevaluations:    ED Course as of 09/06/24 0827   u Sep 05, 2024   2101 WBC(!): 13.4 [JR]   Fri Sep 06, 2024   0006 IMPRESSION:  Stranding along the medial left buttocks, suggestive of an  infectious/inflammatory process.  1.1 cm rounded density beneath the skin  along the gluteal cleft on the left, could reflect small abscess, infected  sebaceous cyst, etc..  Could better evaluate with ultrasound if clinically  warranted.   [JR]   0453 WBC(!): 13.4 [JR]   0826 Patient reevaluated at bedside, after discussing with Dr. Santos, patient felt to be stable for discharge as patient is hemodynamically stable, procalcitonin near normal range, WBC count only minimally elevated at 13, lactic acid within normal limits, CT scan showing no true deep space abscess.  Grandmother who is patient's caretaker at bedside discomfort with discharge plan with antibiotics and close outpatient follow-up.  Strict return precautions given which they both verbalized understanding to.

## 2024-09-06 NOTE — ED NOTES
Radiology Procedure Waiver   Name: Guero Bragg  : 2000  MRN: [id number]    Date:  24    Time: 8:47 PM EDT    Benefits of immediately proceeding with Radiology exam(s) without pre-testing outweigh the risks or are not indicated as specified below and therefore the following is/are being waived:    [x] Pregnancy test   [] Patients LMP on-time and regular.   [] Patient had Tubal Ligation or has other Contraception Device.   [] Patient  is Menopausal or Premenarcheal.    [] Patient had Full or Partial Hysterectomy.    [x] Protocol for Iodine allergy    [] MRI Questionnaire     [x] BUN/Creatinine   [x] Patient age w/no hx of renal dysfunction.   [] Patient on Dialysis.   [] Recent Normal Labs.  Electronically signed by Alex Wallace DO on 24 at 8:47 PM EDT

## 2024-09-06 NOTE — ED PROVIDER NOTES
[facility]  Department of Emergency Medicine   EM Physician H&P                  MahsaGuero 24 y.o. female PMHx of HTN, Obesity, Mental delay, PE on warfarin, heart failure presents to the ED c/o buttock pain/concern for abscess per mother. History primarily provided by mother at bedise due to patient's mental disability.. Onset: Mother reports onset of symptoms started about 4 days ago. Location/Radiation: Left buttock region. Duration: Progressively worsening persistent. Characterization: Warmth, tender to palpation. Aggravating Factors: Trying to sit or touch the area. Relieving Factors: None. Severity: Moderate to severe.  Patient also ports feeling fevers and chills.  Mother reports that patient has a history abscess in the past.  Mother reports she is on Coumadin due to a PE several months ago.  Reports she has not missed any doses of her Coumadin..       Assx Sxs: Elevated heart rate, tender buttock area, warmth, erythema.              Review of Systems   Constitutional:  Positive for activity change, appetite change, chills and fever.   Respiratory:  Negative for shortness of breath.    Cardiovascular:  Negative for chest pain.   Gastrointestinal:  Negative for abdominal distention and abdominal pain.   Skin:  Positive for color change and wound.        Physical Exam  Constitutional:       Appearance: Normal appearance. She is obese. She is not ill-appearing.   HENT:      Right Ear: External ear normal.      Left Ear: External ear normal.      Nose: Nose normal.   Cardiovascular:      Rate and Rhythm: Regular rhythm. Tachycardia present.      Pulses: Normal pulses.      Heart sounds: Normal heart sounds.   Pulmonary:      Effort: Pulmonary effort is normal.      Breath sounds: Normal breath sounds.   Abdominal:      General: There is no distension.      Palpations: Abdomen is soft.      Tenderness: There is no abdominal tenderness.   Musculoskeletal:      Cervical back: Normal  Alkaline Phosphatase 74 35 - 104 U/L    ALT 20 0 - 32 U/L    AST 19 0 - 31 U/L   Lactate, Sepsis   Result Value Ref Range    Lactic Acid, Sepsis 1.1 0.5 - 1.9 mmol/L   Procalcitonin   Result Value Ref Range    Procalcitonin 0.09 (H) 0.00 - 0.08 ng/mL   Protime-INR   Result Value Ref Range    Protime 16.2 (H) 9.3 - 12.4 sec    INR 1.5    Brain Natriuretic Peptide   Result Value Ref Range    Pro-BNP 64 0 - 450 pg/mL   POC Pregnancy Urine Qual   Result Value Ref Range    HCG, Urine, POC Negative Negative    Lot Number [id number]     Positive QC Pass/Fail Acceptable     Negative QC Pass/Fail Acceptable        RADIOLOGY:  CT ABDOMEN PELVIS W IV CONTRAST Additional Contrast? None   Final Result   Stranding along the medial left buttocks, suggestive of an   infectious/inflammatory process.  1.1 cm rounded density beneath the skin   along the gluteal cleft on the left, could reflect small abscess, infected   sebaceous cyst, etc..  Could better evaluate with ultrasound if clinically   warranted.               SEP-1 CORE MEASURE DATA      Sepsis Criteria   Severe Sepsis Criteria   Septic Shock Criteria     Must be confirmed or suspected to move forward with diagnosis of sepsis.    Must meet 2:    [] Temperature > 100.9 F (38.3 C)        or < 96.8 F (36 C)  [x] HR > 90  [] RR > 20  [x] WBC > 12 or < 4 or 10% bands      AND:      [x] Infection Confirmed or        Suspected.     Must meet 1:    [] Lactate > 2       or   [] Signs of Organ Dysfunction:    - SBP < 90 or MAP < 65  - Altered mental status  - Creatinine > 2 or increased from      baseline  - Urine Output < 0.5 ml/kg/hr  - Bilirubin > 2  - INR > 1.5 (not anticoagulated)  - Platelets < 100,000  - Acute Respiratory Failure as     evidenced by new need for NIPPV     or mechanical ventilation      [x] No criteria met for Severe Sepsis.   Must meet 1:    [] Lactate > 4        or   [] SBP < 90 or MAP < 65 for at        least two readings in the first        hour after fluid

## 2024-09-08 LAB
MICROORGANISM SPEC CULT: NORMAL
MICROORGANISM SPEC CULT: NORMAL
SERVICE CMNT-IMP: NORMAL
SERVICE CMNT-IMP: NORMAL
SPECIMEN DESCRIPTION: NORMAL
SPECIMEN DESCRIPTION: NORMAL

## 2024-10-08 ENCOUNTER — HOSPITAL ENCOUNTER (EMERGENCY)
Age: 24
Discharge: HOME | End: 2024-10-08
Attending: EMERGENCY MEDICINE
Payer: COMMERCIAL

## 2024-10-08 VITALS
HEART RATE: 66 BPM | SYSTOLIC BLOOD PRESSURE: 136 MMHG | DIASTOLIC BLOOD PRESSURE: 63 MMHG | BODY MASS INDEX: 55.79 KG/M2 | TEMPERATURE: 98 F | WEIGHT: 293 LBS | RESPIRATION RATE: 18 BRPM | OXYGEN SATURATION: 97 %

## 2024-10-08 DIAGNOSIS — E11.65 HYPERGLYCEMIA DUE TO DIABETES MELLITUS (HCC): Primary | ICD-10-CM

## 2024-10-08 LAB
ALBUMIN SERPL-MCNC: 4.6 G/DL (ref 3.5–5.2)
ALP SERPL-CCNC: 66 U/L (ref 35–104)
ALT SERPL-CCNC: 15 U/L (ref 0–32)
ANION GAP SERPL CALCULATED.3IONS-SCNC: 12 MMOL/L (ref 7–16)
AST SERPL-CCNC: 19 U/L (ref 0–31)
B-OH-BUTYR SERPL-MCNC: 0.15 MMOL/L (ref 0.02–0.27)
BASOPHILS # BLD: 0.03 K/UL (ref 0–0.2)
BASOPHILS NFR BLD: 0 % (ref 0–2)
BILIRUB SERPL-MCNC: 0.2 MG/DL (ref 0–1.2)
BILIRUB UR QL STRIP: NEGATIVE
BUN SERPL-MCNC: 9 MG/DL (ref 6–20)
CALCIUM SERPL-MCNC: 9.8 MG/DL (ref 8.6–10.2)
CHLORIDE SERPL-SCNC: 107 MMOL/L (ref 98–107)
CLARITY UR: CLEAR
CO2 SERPL-SCNC: 24 MMOL/L (ref 22–29)
COLOR UR: YELLOW
CREAT SERPL-MCNC: 0.9 MG/DL (ref 0.5–1)
EOSINOPHIL # BLD: 0.47 K/UL (ref 0.05–0.5)
EOSINOPHILS RELATIVE PERCENT: 7 % (ref 0–6)
EPI CELLS #/AREA URNS HPF: ABNORMAL /HPF
ERYTHROCYTE [DISTWIDTH] IN BLOOD BY AUTOMATED COUNT: 16.4 % (ref 11.5–15)
GFR, ESTIMATED: 89 ML/MIN/1.73M2
GLUCOSE BLD-MCNC: 111 MG/DL (ref 74–99)
GLUCOSE SERPL-MCNC: 108 MG/DL (ref 74–99)
GLUCOSE UR STRIP-MCNC: NEGATIVE MG/DL
HCG, URINE, POC: NEGATIVE
HCT VFR BLD AUTO: 45.2 % (ref 34–48)
HGB BLD-MCNC: 13.2 G/DL (ref 11.5–15.5)
HGB UR QL STRIP.AUTO: ABNORMAL
IMM GRANULOCYTES # BLD AUTO: 0.12 K/UL (ref 0–0.58)
IMM GRANULOCYTES NFR BLD: 2 % (ref 0–5)
INR PPP: 2.5
KETONES UR STRIP-MCNC: NEGATIVE MG/DL
LEUKOCYTE ESTERASE UR QL STRIP: NEGATIVE
LYMPHOCYTES NFR BLD: 3 K/UL (ref 1.5–4)
LYMPHOCYTES RELATIVE PERCENT: 42 % (ref 20–42)
Lab: NORMAL
MAGNESIUM SERPL-MCNC: 2.2 MG/DL (ref 1.6–2.6)
MCH RBC QN AUTO: 24.9 PG (ref 26–35)
MCHC RBC AUTO-ENTMCNC: 29.2 G/DL (ref 32–34.5)
MCV RBC AUTO: 85.3 FL (ref 80–99.9)
MONOCYTES NFR BLD: 0.89 K/UL (ref 0.1–0.95)
MONOCYTES NFR BLD: 12 % (ref 2–12)
NEGATIVE QC PASS/FAIL: NORMAL
NEUTROPHILS NFR BLD: 37 % (ref 43–80)
NEUTS SEG NFR BLD: 2.64 K/UL (ref 1.8–7.3)
NITRITE UR QL STRIP: NEGATIVE
PH UR STRIP: 5.5 [PH] (ref 5–9)
PH VENOUS: 7.36 (ref 7.35–7.45)
PLATELET # BLD AUTO: 217 K/UL (ref 130–450)
PMV BLD AUTO: 10.9 FL (ref 7–12)
POSITIVE QC PASS/FAIL: NORMAL
POTASSIUM SERPL-SCNC: 4.4 MMOL/L (ref 3.5–5)
PROT SERPL-MCNC: 8.3 G/DL (ref 6.4–8.3)
PROT UR STRIP-MCNC: NEGATIVE MG/DL
PROTHROMBIN TIME: 28 SEC (ref 9.3–12.4)
RBC # BLD AUTO: 5.3 M/UL (ref 3.5–5.5)
RBC #/AREA URNS HPF: ABNORMAL /HPF
SODIUM SERPL-SCNC: 143 MMOL/L (ref 132–146)
SP GR UR STRIP: <1.005 (ref 1–1.03)
UROBILINOGEN UR STRIP-ACNC: 0.2 EU/DL (ref 0–1)
WBC #/AREA URNS HPF: ABNORMAL /HPF
WBC OTHER # BLD: 7.2 K/UL (ref 4.5–11.5)

## 2024-10-08 PROCEDURE — 82010 KETONE BODYS QUAN: CPT

## 2024-10-08 PROCEDURE — 85610 PROTHROMBIN TIME: CPT

## 2024-10-08 PROCEDURE — 82800 BLOOD PH: CPT

## 2024-10-08 PROCEDURE — 83735 ASSAY OF MAGNESIUM: CPT

## 2024-10-08 PROCEDURE — 80053 COMPREHEN METABOLIC PANEL: CPT

## 2024-10-08 PROCEDURE — 85025 COMPLETE CBC W/AUTO DIFF WBC: CPT

## 2024-10-08 PROCEDURE — 81001 URINALYSIS AUTO W/SCOPE: CPT

## 2024-10-08 PROCEDURE — 99283 EMERGENCY DEPT VISIT LOW MDM: CPT

## 2024-10-08 PROCEDURE — 82962 GLUCOSE BLOOD TEST: CPT

## 2024-10-08 ASSESSMENT — PAIN DESCRIPTION - LOCATION: LOCATION: HEAD;EYE

## 2024-10-08 ASSESSMENT — PAIN - FUNCTIONAL ASSESSMENT: PAIN_FUNCTIONAL_ASSESSMENT: 0-10

## 2024-10-08 ASSESSMENT — PAIN SCALES - GENERAL: PAINLEVEL_OUTOF10: 10

## 2024-10-08 ASSESSMENT — PAIN DESCRIPTION - DESCRIPTORS: DESCRIPTORS: POUNDING

## 2024-10-08 NOTE — ED PROVIDER NOTES
AND VITALS REVIEWED ---------------------------   The nursing notes within the ED encounter and vital signs as below have been reviewed by myself.  BP (!) 147/90   Pulse 65   Temp 98 °F (36.7 °C)   Resp 18   Wt (!) 147.4 kg (325 lb)   SpO2 96%   BMI 55.79 kg/m²   Oxygen Saturation Interpretation: Normal    The patient’s available past medical records and past encounters were reviewed.        ------------------------------ ED COURSE/MEDICAL DECISION MAKING----------------------        ED COURSE:  ED Course as of 10/08/24 1509   Tue Oct 08, 2024   1502 HbA1c 7.3% in 2024 [SS]      ED Course User Index  [SS] Rebecca Hawkins MD     Additional history obtained from:  Grandmother    Independent interpretation of tests:  Mild hyperglycemia  No ketonemia, ketonuria, or acidemia      Social determinants of health affecting patient care:  poor medical literacy    The patient presents with a new acute problem or complaint.        Counseling:   The emergency provider has spoken with the patient and grandmother and discussed today’s results, in addition to providing specific details for the plan of care and counseling regarding the diagnosis and prognosis.  Questions are answered at this time and they are agreeable with the plan.    ED Course/Medical Decision Makin y.o. female here with elevated blood sugar at home.  Patient has been previously told she is diabetic.  She had a hemoglobin A1c of 7.3% during an inpatient admission in May.  She and her grandmother state that she was never started on any medication for this or has never had it addressed.  They have been checking her blood glucose at home due to polyuria and it has been as high as the 280s.  On arrival patient is well-appearing, afebrile, hemodynamically stable, and in no acute distress.  Lab workup reassuring.  INR therapeutic in the setting of anticoagulation for recently diagnosed pulmonary embolism.  Will start metformin 500 mg daily.  After discussion

## 2025-03-03 DIAGNOSIS — R56.9 SEIZURES (HCC): ICD-10-CM

## 2025-03-05 RX ORDER — LACOSAMIDE 150 MG/1
TABLET ORAL
Qty: 60 TABLET | Refills: 2 | Status: SHIPPED | OUTPATIENT
Start: 2025-03-05 | End: 2025-06-05

## 2025-04-04 ENCOUNTER — HOSPITAL ENCOUNTER (OUTPATIENT)
Age: 25
Discharge: HOME | End: 2025-04-04
Payer: COMMERCIAL

## 2025-04-04 PROCEDURE — 86481 TB AG RESPONSE T-CELL SUSP: CPT

## 2025-04-04 PROCEDURE — 36415 COLL VENOUS BLD VENIPUNCTURE: CPT

## 2025-04-07 LAB — T SPOT TB TEST: NORMAL

## 2025-07-09 DIAGNOSIS — R56.9 SEIZURES (HCC): ICD-10-CM

## 2025-07-10 RX ORDER — LACOSAMIDE 150 MG/1
TABLET ORAL
Qty: 60 TABLET | Refills: 2 | Status: SHIPPED | OUTPATIENT
Start: 2025-07-10 | End: 2025-10-10

## 2025-08-01 ENCOUNTER — HOSPITAL ENCOUNTER (EMERGENCY)
Age: 25
Discharge: HOME | End: 2025-08-01
Payer: COMMERCIAL

## 2025-08-01 ENCOUNTER — APPOINTMENT (OUTPATIENT)
Dept: CT IMAGING | Age: 25
End: 2025-08-01
Payer: COMMERCIAL

## 2025-08-01 VITALS
DIASTOLIC BLOOD PRESSURE: 71 MMHG | HEIGHT: 64 IN | OXYGEN SATURATION: 96 % | WEIGHT: 293 LBS | BODY MASS INDEX: 50.02 KG/M2 | SYSTOLIC BLOOD PRESSURE: 129 MMHG | RESPIRATION RATE: 16 BRPM | TEMPERATURE: 98.6 F | HEART RATE: 84 BPM

## 2025-08-01 DIAGNOSIS — K59.00 CONSTIPATION, UNSPECIFIED CONSTIPATION TYPE: ICD-10-CM

## 2025-08-01 DIAGNOSIS — R07.89 CHEST WALL PAIN: Primary | ICD-10-CM

## 2025-08-01 DIAGNOSIS — I28.8 DILATION OF PULMONARY ARTERY (HCC): ICD-10-CM

## 2025-08-01 LAB
ALBUMIN SERPL-MCNC: 3.9 G/DL (ref 3.5–5.2)
ALP SERPL-CCNC: 49 U/L (ref 35–104)
ALT SERPL-CCNC: 20 U/L (ref 0–35)
ANION GAP SERPL CALCULATED.3IONS-SCNC: 11 MMOL/L (ref 7–16)
ANION GAP SERPL CALCULATED.3IONS-SCNC: 12 MMOL/L (ref 7–16)
AST SERPL-CCNC: 53 U/L (ref 0–35)
BACTERIA URNS QL MICRO: ABNORMAL
BASOPHILS # BLD: 0.02 K/UL (ref 0–0.2)
BASOPHILS NFR BLD: 0 % (ref 0–2)
BILIRUB DIRECT SERPL-MCNC: <0.1 MG/DL (ref 0–0.2)
BILIRUB INDIRECT SERPL-MCNC: ABNORMAL MG/DL (ref 0–1)
BILIRUB SERPL-MCNC: <0.2 MG/DL (ref 0–1.2)
BILIRUB UR QL STRIP: NEGATIVE
BNP SERPL-MCNC: 63 PG/ML (ref 0–125)
BUN SERPL-MCNC: 5 MG/DL (ref 6–20)
BUN SERPL-MCNC: 5 MG/DL (ref 6–20)
CALCIUM SERPL-MCNC: 9.4 MG/DL (ref 8.6–10)
CALCIUM SERPL-MCNC: 9.6 MG/DL (ref 8.6–10)
CHLORIDE SERPL-SCNC: 107 MMOL/L (ref 98–107)
CHLORIDE SERPL-SCNC: 109 MMOL/L (ref 98–107)
CLARITY UR: CLEAR
CO2 SERPL-SCNC: 21 MMOL/L (ref 22–29)
CO2 SERPL-SCNC: 22 MMOL/L (ref 22–29)
COLOR UR: YELLOW
CREAT SERPL-MCNC: 0.9 MG/DL (ref 0.5–1)
CREAT SERPL-MCNC: 0.9 MG/DL (ref 0.5–1)
EOSINOPHIL # BLD: 0.18 K/UL (ref 0.05–0.5)
EOSINOPHILS RELATIVE PERCENT: 3 % (ref 0–6)
EPI CELLS #/AREA URNS HPF: ABNORMAL /HPF
ERYTHROCYTE [DISTWIDTH] IN BLOOD BY AUTOMATED COUNT: 15.9 % (ref 11.5–15)
GFR, ESTIMATED: >90 ML/MIN/1.73M2
GFR, ESTIMATED: >90 ML/MIN/1.73M2
GLUCOSE SERPL-MCNC: 79 MG/DL (ref 74–99)
GLUCOSE SERPL-MCNC: 81 MG/DL (ref 74–99)
GLUCOSE UR STRIP-MCNC: NEGATIVE MG/DL
HCG, URINE, POC: NEGATIVE
HCT VFR BLD AUTO: 39.7 % (ref 34–48)
HETEROPH AB BLD QL IA: NEGATIVE
HGB BLD-MCNC: 12 G/DL (ref 11.5–15.5)
HGB UR QL STRIP.AUTO: NEGATIVE
IMM GRANULOCYTES # BLD AUTO: 0.04 K/UL (ref 0–0.58)
IMM GRANULOCYTES NFR BLD: 1 % (ref 0–5)
INR PPP: 2.5
KETONES UR STRIP-MCNC: NEGATIVE MG/DL
LACTATE BLDV-SCNC: 1.1 MMOL/L (ref 0.5–2.2)
LEUKOCYTE ESTERASE UR QL STRIP: NEGATIVE
LIPASE SERPL-CCNC: 47 U/L (ref 13–60)
LYMPHOCYTES NFR BLD: 2.03 K/UL (ref 1.5–4)
LYMPHOCYTES RELATIVE PERCENT: 31 % (ref 20–42)
Lab: NORMAL
MCH RBC QN AUTO: 26.9 PG (ref 26–35)
MCHC RBC AUTO-ENTMCNC: 30.2 G/DL (ref 32–34.5)
MCV RBC AUTO: 89 FL (ref 80–99.9)
MONOCYTES NFR BLD: 0.85 K/UL (ref 0.1–0.95)
MONOCYTES NFR BLD: 13 % (ref 2–12)
NEGATIVE QC PASS/FAIL: NORMAL
NEUTROPHILS NFR BLD: 52 % (ref 43–80)
NEUTS SEG NFR BLD: 3.38 K/UL (ref 1.8–7.3)
NITRITE UR QL STRIP: NEGATIVE
PARTIAL THROMBOPLASTIN TIME: 37 SEC (ref 24.5–35.1)
PH UR STRIP: 7 [PH] (ref 5–8)
PLATELET # BLD AUTO: 249 K/UL (ref 130–450)
PMV BLD AUTO: 11 FL (ref 7–12)
POSITIVE QC PASS/FAIL: NORMAL
POTASSIUM SERPL-SCNC: 4.1 MMOL/L (ref 3.5–5.1)
POTASSIUM SERPL-SCNC: 5.4 MMOL/L (ref 3.5–5.1)
PROT SERPL-MCNC: 7.4 G/DL (ref 6.4–8.3)
PROT UR STRIP-MCNC: NEGATIVE MG/DL
PROTHROMBIN TIME: 27.4 SEC (ref 9.3–12.4)
RBC # BLD AUTO: 4.46 M/UL (ref 3.5–5.5)
RBC #/AREA URNS HPF: ABNORMAL /HPF
SODIUM SERPL-SCNC: 140 MMOL/L (ref 136–145)
SODIUM SERPL-SCNC: 141 MMOL/L (ref 136–145)
SP GR UR STRIP: <1.005 (ref 1–1.03)
TROPONIN I SERPL HS-MCNC: 13 NG/L (ref 0–14)
TROPONIN I SERPL HS-MCNC: 14 NG/L (ref 0–14)
TROPONIN I SERPL HS-MCNC: 18 NG/L (ref 0–14)
UROBILINOGEN UR STRIP-ACNC: 0.2 EU/DL (ref 0–1)
WBC #/AREA URNS HPF: ABNORMAL /HPF
WBC OTHER # BLD: 6.5 K/UL (ref 4.5–11.5)

## 2025-08-01 PROCEDURE — 80053 COMPREHEN METABOLIC PANEL: CPT

## 2025-08-01 PROCEDURE — 83880 ASSAY OF NATRIURETIC PEPTIDE: CPT

## 2025-08-01 PROCEDURE — 2500000003 HC RX 250 WO HCPCS: Performed by: NURSE PRACTITIONER

## 2025-08-01 PROCEDURE — 6370000000 HC RX 637 (ALT 250 FOR IP): Performed by: NURSE PRACTITIONER

## 2025-08-01 PROCEDURE — 99285 EMERGENCY DEPT VISIT HI MDM: CPT

## 2025-08-01 PROCEDURE — 87086 URINE CULTURE/COLONY COUNT: CPT

## 2025-08-01 PROCEDURE — 85730 THROMBOPLASTIN TIME PARTIAL: CPT

## 2025-08-01 PROCEDURE — 74177 CT ABD & PELVIS W/CONTRAST: CPT

## 2025-08-01 PROCEDURE — 84484 ASSAY OF TROPONIN QUANT: CPT

## 2025-08-01 PROCEDURE — 85025 COMPLETE CBC W/AUTO DIFF WBC: CPT

## 2025-08-01 PROCEDURE — 96375 TX/PRO/DX INJ NEW DRUG ADDON: CPT

## 2025-08-01 PROCEDURE — 83690 ASSAY OF LIPASE: CPT

## 2025-08-01 PROCEDURE — 71275 CT ANGIOGRAPHY CHEST: CPT

## 2025-08-01 PROCEDURE — 6360000002 HC RX W HCPCS: Performed by: NURSE PRACTITIONER

## 2025-08-01 PROCEDURE — 82248 BILIRUBIN DIRECT: CPT

## 2025-08-01 PROCEDURE — 96374 THER/PROPH/DIAG INJ IV PUSH: CPT

## 2025-08-01 PROCEDURE — 81001 URINALYSIS AUTO W/SCOPE: CPT

## 2025-08-01 PROCEDURE — 85610 PROTHROMBIN TIME: CPT

## 2025-08-01 PROCEDURE — 93005 ELECTROCARDIOGRAM TRACING: CPT | Performed by: NURSE PRACTITIONER

## 2025-08-01 PROCEDURE — 83605 ASSAY OF LACTIC ACID: CPT

## 2025-08-01 PROCEDURE — 80048 BASIC METABOLIC PNL TOTAL CA: CPT

## 2025-08-01 PROCEDURE — 86308 HETEROPHILE ANTIBODY SCREEN: CPT

## 2025-08-01 PROCEDURE — 6360000004 HC RX CONTRAST MEDICATION: Performed by: RADIOLOGY

## 2025-08-01 RX ORDER — DIPHENHYDRAMINE HYDROCHLORIDE 50 MG/ML
50 INJECTION, SOLUTION INTRAMUSCULAR; INTRAVENOUS ONCE
Status: COMPLETED | OUTPATIENT
Start: 2025-08-01 | End: 2025-08-01

## 2025-08-01 RX ORDER — ONDANSETRON 2 MG/ML
4 INJECTION INTRAMUSCULAR; INTRAVENOUS ONCE
Status: COMPLETED | OUTPATIENT
Start: 2025-08-01 | End: 2025-08-01

## 2025-08-01 RX ORDER — IOPAMIDOL 755 MG/ML
75 INJECTION, SOLUTION INTRAVASCULAR
Status: COMPLETED | OUTPATIENT
Start: 2025-08-01 | End: 2025-08-01

## 2025-08-01 RX ORDER — POLYETHYLENE GLYCOL 3350 17 G/17G
17 POWDER, FOR SOLUTION ORAL DAILY
Qty: 527 G | Refills: 1 | Status: SHIPPED | OUTPATIENT
Start: 2025-08-01

## 2025-08-01 RX ADMIN — LIDOCAINE HYDROCHLORIDE: 20 SOLUTION ORAL at 15:00

## 2025-08-01 RX ADMIN — WATER 125 MG: 1 INJECTION INTRAMUSCULAR; INTRAVENOUS; SUBCUTANEOUS at 15:00

## 2025-08-01 RX ADMIN — IOPAMIDOL 75 ML: 755 INJECTION, SOLUTION INTRAVENOUS at 16:56

## 2025-08-01 RX ADMIN — DIPHENHYDRAMINE HYDROCHLORIDE 50 MG: 50 INJECTION INTRAMUSCULAR; INTRAVENOUS at 15:00

## 2025-08-01 RX ADMIN — ONDANSETRON 4 MG: 2 INJECTION, SOLUTION INTRAMUSCULAR; INTRAVENOUS at 15:00

## 2025-08-01 ASSESSMENT — PAIN DESCRIPTION - ORIENTATION: ORIENTATION: MID

## 2025-08-01 ASSESSMENT — PAIN DESCRIPTION - LOCATION: LOCATION: CHEST

## 2025-08-01 ASSESSMENT — PAIN - FUNCTIONAL ASSESSMENT
PAIN_FUNCTIONAL_ASSESSMENT: ACTIVITIES ARE NOT PREVENTED
PAIN_FUNCTIONAL_ASSESSMENT: 0-10

## 2025-08-01 ASSESSMENT — PAIN SCALES - GENERAL: PAINLEVEL_OUTOF10: 10

## 2025-08-01 ASSESSMENT — PAIN DESCRIPTION - DESCRIPTORS: DESCRIPTORS: ACHING

## 2025-08-01 ASSESSMENT — PAIN DESCRIPTION - PAIN TYPE: TYPE: ACUTE PAIN

## 2025-08-01 NOTE — DISCHARGE INSTRUCTIONS
CTA PULMONARY W CONTRAST   Final Result   1. No evidence of pulmonary embolism or acute pulmonary abnormality.   2. Minimal dilatation of the main pulmonary artery at 3 cm. This can be seen   in the setting of pulmonary hypertension.         CT ABDOMEN PELVIS W IV CONTRAST Additional Contrast? None   Final Result   1. No acute intra-abdominal or pelvic process.   2. Mild formed stool load in the right colon and rectosigmoid colon.   3. Bilateral L5 spondylolysis with no evidence of spondylolisthesis.              All of your labs were normal, your CAT scan of the chest was normal.  You do have some constipation on the CAT scan of the abdomen.  There is no blood clot in the lung.  You will start MiraLAX daily.

## 2025-08-01 NOTE — ED PROVIDER NOTES
Independent ROSLYN Visit.     [facility] EMERGENCY DEPARTMENT  EMERGENCY DEPARTMENT ENCOUNTER      Pt Name: Guero Bragg  MRN: [id number]  Birthdate 2000  Date of evaluation: 8/1/2025  Provider: RALPH Jacobson - KENDRICK  PCP: Cristo Morelos DO  Note Started: 2:30 PM EDT 8/1/25    CHIEF COMPLAINT       Chief Complaint   Patient presents with    Chest Pain     Pt is from [place], c/o non radiating, reproducible chest pain starting this morning, no hx MI/ACS, Hx cognitive disability       HISTORY OF PRESENT ILLNESS: 1 or more Elements   History From: Patient  Limitations to history : Intellectual disability    Guero Bragg is a 25 y.o. female who has a past medical history of ADHD, autism, schizophrenia, seizures, morbid obesity, hypertension, pulmonary embolus, chest pain presents to the emergency department with left-sided chest pain and left lower quadrant abdominal pain onset around noon today.  Patient states that she did not eat lunch because of the pain.  She denies any nausea, vomiting, shortness of breath.    Nursing Notes were all reviewed and agreed with or any disagreements were addressed in the HPI.    REVIEW OF SYSTEMS :    Positives and Pertinent negatives as per HPI.     PAST MEDICAL HISTORY/Chronic Conditions Affecting Care    has a past medical history of ADHD, Autism disorder, Colitis, Schizophrenia (HCC), and Seizures (HCC).     SURGICAL HISTORY     Past Surgical History:   Procedure Laterality Date    BREAST SURGERY Right     lumpectomy    LAPAROSCOPY Left 8/9/2019    LAPAROSCOPY, REMOVAL OF LEFT OVARIAN MASS, PERITONEAL WASHINGS FOR CELL BLOCK performed by Yeimy Sigala MD at [facility] OR       CURRENTMEDICATIONS       Discharge Medication List as of 8/1/2025  8:25 PM        CONTINUE these medications which have NOT CHANGED    Details   lacosamide (VIMPAT) 150 MG TABS tablet TAKE 1 TABLET BY MOUTH TWICE DAY AT 7:00 AM AND 9:00 PM, Disp-60 tablet, R-2Normal

## 2025-08-02 LAB
MICROORGANISM SPEC CULT: ABNORMAL
SERVICE CMNT-IMP: ABNORMAL
SPECIMEN DESCRIPTION: ABNORMAL

## 2025-08-03 LAB
EKG ATRIAL RATE: 69 BPM
EKG P AXIS: 40 DEGREES
EKG P-R INTERVAL: 148 MS
EKG Q-T INTERVAL: 356 MS
EKG QRS DURATION: 76 MS
EKG QTC CALCULATION (BAZETT): 381 MS
EKG R AXIS: 0 DEGREES
EKG T AXIS: -27 DEGREES
EKG VENTRICULAR RATE: 69 BPM

## 2025-08-04 PROCEDURE — 93010 ELECTROCARDIOGRAM REPORT: CPT | Performed by: INTERNAL MEDICINE

## 2025-08-05 ENCOUNTER — HOSPITAL ENCOUNTER (OUTPATIENT)
Dept: LAB | Age: 25
Discharge: HOME | End: 2025-08-05
Payer: COMMERCIAL

## 2025-08-05 LAB
ALBUMIN SERPL-MCNC: 4 G/DL (ref 3.5–5.2)
ALP SERPL-CCNC: 56 U/L (ref 35–104)
ALT SERPL-CCNC: 18 U/L (ref 0–35)
ANION GAP SERPL CALCULATED.3IONS-SCNC: 10 MMOL/L (ref 7–16)
AST SERPL-CCNC: 20 U/L (ref 0–35)
BASOPHILS # BLD: 0.03 K/UL (ref 0–0.2)
BASOPHILS NFR BLD: 0 % (ref 0–2)
BILIRUB SERPL-MCNC: 0.4 MG/DL (ref 0–1.2)
BUN SERPL-MCNC: 5 MG/DL (ref 6–20)
CALCIUM SERPL-MCNC: 9.6 MG/DL (ref 8.6–10)
CHLORIDE SERPL-SCNC: 106 MMOL/L (ref 98–107)
CHOLEST SERPL-MCNC: 243 MG/DL
CO2 SERPL-SCNC: 25 MMOL/L (ref 22–29)
CREAT SERPL-MCNC: 1 MG/DL (ref 0.5–1)
EOSINOPHIL # BLD: 0.34 K/UL (ref 0.05–0.5)
EOSINOPHILS RELATIVE PERCENT: 5 % (ref 0–6)
ERYTHROCYTE [DISTWIDTH] IN BLOOD BY AUTOMATED COUNT: 15.9 % (ref 11.5–15)
GFR, ESTIMATED: 83 ML/MIN/1.73M2
GLUCOSE SERPL-MCNC: 81 MG/DL (ref 74–99)
HCT VFR BLD AUTO: 41.8 % (ref 34–48)
HDLC SERPL-MCNC: 48 MG/DL
HGB BLD-MCNC: 12.6 G/DL (ref 11.5–15.5)
IMM GRANULOCYTES # BLD AUTO: 0.08 K/UL (ref 0–0.58)
IMM GRANULOCYTES NFR BLD: 1 % (ref 0–5)
LDLC SERPL CALC-MCNC: 170 MG/DL
LITHIUM DATE LAST DOSE: 8
LITHIUM DOSE AMOUNT: 8
LITHIUM DOSE TIME: 8
LITHIUM LEVEL: 0.9 MMOL/L (ref 0.6–1.2)
LYMPHOCYTES NFR BLD: 2.54 K/UL (ref 1.5–4)
LYMPHOCYTES RELATIVE PERCENT: 37 % (ref 20–42)
MCH RBC QN AUTO: 27.5 PG (ref 26–35)
MCHC RBC AUTO-ENTMCNC: 30.1 G/DL (ref 32–34.5)
MCV RBC AUTO: 91.3 FL (ref 80–99.9)
MONOCYTES NFR BLD: 0.99 K/UL (ref 0.1–0.95)
MONOCYTES NFR BLD: 14 % (ref 2–12)
NEUTROPHILS NFR BLD: 43 % (ref 43–80)
NEUTS SEG NFR BLD: 2.98 K/UL (ref 1.8–7.3)
PLATELET # BLD AUTO: 271 K/UL (ref 130–450)
PMV BLD AUTO: 11.1 FL (ref 7–12)
POTASSIUM SERPL-SCNC: 4.8 MMOL/L (ref 3.5–5.1)
PROT SERPL-MCNC: 7.6 G/DL (ref 6.4–8.3)
RBC # BLD AUTO: 4.58 M/UL (ref 3.5–5.5)
SODIUM SERPL-SCNC: 140 MMOL/L (ref 136–145)
TRIGL SERPL-MCNC: 126 MG/DL
VLDLC SERPL CALC-MCNC: 25 MG/DL
WBC OTHER # BLD: 7 K/UL (ref 4.5–11.5)

## 2025-08-05 PROCEDURE — 80061 LIPID PANEL: CPT

## 2025-08-05 PROCEDURE — 85025 COMPLETE CBC W/AUTO DIFF WBC: CPT

## 2025-08-05 PROCEDURE — 80053 COMPREHEN METABOLIC PANEL: CPT

## 2025-08-05 PROCEDURE — 80178 ASSAY OF LITHIUM: CPT

## (undated) DEVICE — BINDER ABD 4-PANEL 12INCH 63-74INCH L N ST

## (undated) DEVICE — TROCAR: Brand: KII® SLEEVE

## (undated) DEVICE — SYRINGE,TOOMEY,IRRIGATION,70CC,STERILE: Brand: MEDLINE

## (undated) DEVICE — NEEDLE SPNL 22GA L7IN BLK HUB S STL W/ QNCKE PNT W/OUT

## (undated) DEVICE — SCISSORS ENDOSCP DIA5MM CRV MPLR CAUT W/ RATCH HNDL

## (undated) DEVICE — Z INACTIVE USE 2735373 APPLICATOR FBR LAIN COT WOOD TIP ECONOMICAL

## (undated) DEVICE — SYRINGE, LUER LOCK, 10ML: Brand: MEDLINE

## (undated) DEVICE — TRAY PROCED DILATATION CURETTAGE

## (undated) DEVICE — PAD MATERNITY CURITY ADH STRIP DISP

## (undated) DEVICE — TROCAR: Brand: KII SHIELDED BLADED ACCESS SYSTEM

## (undated) DEVICE — SOLUTION INJ ST H2O VIAFLX PLAS 1000ML CONT FOR DRUG DIL

## (undated) DEVICE — GAUZE,SPONGE,4"X4",16PLY,XRAY,STRL,LF: Brand: MEDLINE

## (undated) DEVICE — TOTAL TRAY, 16FR 10ML SIL FOLEY, URN: Brand: MEDLINE

## (undated) DEVICE — PACK,AURORA,LAVH: Brand: MEDLINE

## (undated) DEVICE — TOWEL,OR,DSP,ST,BLUE,STD,6/PK,12PK/CS: Brand: MEDLINE

## (undated) DEVICE — CATHETER,URETHRAL,VINYL,MALE,16",16 FR: Brand: MEDLINE

## (undated) DEVICE — COVER,LIGHT HANDLE,FLX,1/PK: Brand: MEDLINE INDUSTRIES, INC.

## (undated) DEVICE — [HIGH FLOW INSUFFLATOR,  DO NOT USE IF PACKAGE IS DAMAGED,  KEEP DRY,  KEEP AWAY FROM SUNLIGHT,  PROTECT FROM HEAT AND RADIOACTIVE SOURCES.]: Brand: PNEUMOSURE

## (undated) DEVICE — SEALER ENDOSCP L37CM NANO COAT BLNT TIP LAP DIV

## (undated) DEVICE — MICRO TIP WIPE: Brand: DEVON

## (undated) DEVICE — TRAY,VAG PREP,2PR VNYL GLV,4 C: Brand: MEDLINE INDUSTRIES, INC.

## (undated) DEVICE — MARKER,SKIN,WI/RULER AND LABELS: Brand: MEDLINE

## (undated) DEVICE — ANTI-FOG SOLUTION WITH FOAM PAD: Brand: DEVON

## (undated) DEVICE — STRIP,CLOSURE,WOUND,MEDI-STRIP,1/2X4: Brand: MEDLINE

## (undated) DEVICE — SOLUTION IV 1000ML LAC RINGERS PH 6.5 INJ USP VIAFLX PLAS

## (undated) DEVICE — INSUFFLATION NEEDLE TO ESTABLISH PNEUMOPERITONEUM.: Brand: INSUFFLATION NEEDLE

## (undated) DEVICE — APPLICATOR PREP 26ML 0.7% IOD POVACRYLEX 74% ISO ALC ST

## (undated) DEVICE — 40586 ADVANCED TRENDELENBURG POSITIONING KIT: Brand: 40586 ADVANCED TRENDELENBURG POSITIONING KIT

## (undated) DEVICE — SET ENDO INSTR LAPAROSCOPIC INCISIONAL

## (undated) DEVICE — CONTROL SYRINGE LUER-LOCK TIP: Brand: MONOJECT

## (undated) DEVICE — STANDARD HYPODERMIC NEEDLE,POLYPROPYLENE HUB: Brand: MONOJECT

## (undated) DEVICE — ELECTROSURGICAL PENCIL BUTTON SWITCH E-Z CLEAN COATED BLADE ELECTRODE 10 FT (3 M) CORD HOLSTER: Brand: MEGADYNE

## (undated) DEVICE — CAMERA STRYKER 1488 HD GEN

## (undated) DEVICE — DOUBLE BASIN SET: Brand: MEDLINE INDUSTRIES, INC.

## (undated) DEVICE — BANDAGE,GAUZE,4.5"X4.1YD,STERILE,LF: Brand: MEDLINE

## (undated) DEVICE — GOWN,SIRUS,NONRNF,SETINSLV,XL,20/CS: Brand: MEDLINE

## (undated) DEVICE — SPECIMEN CUP W/LID: Brand: DEROYAL

## (undated) DEVICE — BINDER ABD M/L H12IN FOR 46-62IN WHT 4 SLD PNL DSGN HOOP

## (undated) DEVICE — NDL CNTR 40CT FM MAG: Brand: MEDLINE INDUSTRIES, INC.

## (undated) DEVICE — INTENDED FOR TISSUE SEPARATION, AND OTHER PROCEDURES THAT REQUIRE A SHARP SURGICAL BLADE TO PUNCTURE OR CUT.: Brand: BARD-PARKER ® STAINLESS STEEL BLADES

## (undated) DEVICE — SET ENDO INSTR GRN LAPAROSCOPIC

## (undated) DEVICE — Device

## (undated) DEVICE — TROCARS: Brand: KII® OPTICAL ACCESS SYSTEM

## (undated) DEVICE — GLOVE SURG SZ 65 THK91MIL LTX FREE SYN POLYISOPRENE

## (undated) DEVICE — SYRINGE MED 50ML LUERLOCK TIP

## (undated) DEVICE — PUMP SUC IRR TBNG L10FT W/ HNDPC ASSEMB STRYKEFLOW 2